# Patient Record
Sex: MALE | Race: WHITE | Employment: OTHER | URBAN - METROPOLITAN AREA
[De-identification: names, ages, dates, MRNs, and addresses within clinical notes are randomized per-mention and may not be internally consistent; named-entity substitution may affect disease eponyms.]

---

## 2017-06-27 ENCOUNTER — OFFICE VISIT (OUTPATIENT)
Dept: CARDIOLOGY CLINIC | Age: 62
End: 2017-06-27

## 2017-06-27 VITALS
HEIGHT: 71 IN | DIASTOLIC BLOOD PRESSURE: 71 MMHG | HEART RATE: 70 BPM | BODY MASS INDEX: 35 KG/M2 | WEIGHT: 250 LBS | SYSTOLIC BLOOD PRESSURE: 136 MMHG

## 2017-06-27 DIAGNOSIS — Z98.61 POSTSURGICAL PERCUTANEOUS TRANSLUMINAL CORONARY ANGIOPLASTY STATUS: ICD-10-CM

## 2017-06-27 DIAGNOSIS — R06.02 SOB (SHORTNESS OF BREATH) ON EXERTION: ICD-10-CM

## 2017-06-27 DIAGNOSIS — E78.5 OTHER AND UNSPECIFIED HYPERLIPIDEMIA: ICD-10-CM

## 2017-06-27 DIAGNOSIS — I25.119 ATHEROSCLEROSIS OF NATIVE CORONARY ARTERY OF NATIVE HEART WITH ANGINA PECTORIS (HCC): Primary | ICD-10-CM

## 2017-06-27 RX ORDER — AMLODIPINE BESYLATE 10 MG/1
TABLET ORAL DAILY
COMMUNITY
End: 2019-08-16 | Stop reason: CLARIF

## 2017-06-27 RX ORDER — ALPRAZOLAM 0.5 MG/1
TABLET ORAL AS NEEDED
COMMUNITY
End: 2020-01-07

## 2017-06-27 NOTE — LETTER
Matt Sheridan 1955 6/27/2017 Dear Neli Montero MD 
 
I had the pleasure of evaluating  Mr. Jakub Cazares in office today. Below are the relevant portions of my assessment and plan of care. ICD-10-CM ICD-9-CM 1. Atherosclerosis of native coronary artery of native heart with angina pectoris (HCC) I25.119 414.01 SCHEDULE NUCLEAR STUDY  
  413.9 2D ECHO COMPLETE ADULT (TTE) AMB POC EKG ROUTINE W/ 12 LEADS, INTER & REP  
 sob on exertion 2. Postsurgical percutaneous transluminal coronary angioplasty status Z98.61 V45.82   
 2004 3. Other and unspecified hyperlipidemia E78.5 272.4 CBC WITH AUTOMATED DIFF  
   METABOLIC PANEL, BASIC  
   LIPID PANEL  
   HEPATIC FUNCTION PANEL  
 has stopped statin due to elevated lft 4. SOB (shortness of breath) on exertion R06.02 786.05 SCHEDULE NUCLEAR STUDY  
   2D ECHO COMPLETE ADULT (TTE) PFT DLCO  
 ? ischemia ? pulmonary issues Current Outpatient Prescriptions Medication Sig Dispense Refill  amLODIPine (NORVASC) 10 mg tablet Take  by mouth daily.  LANSOPRAZOLE PO Take  by mouth every other day.  ALPRAZolam (XANAX) 0.5 mg tablet Take  by mouth as needed for Anxiety.  DOCOSAHEXANOIC ACID/EPA (FISH OIL PO) Take  by mouth daily.  ASPIRIN PO Take 81 mg by mouth daily. Orders Placed This Encounter  CBC WITH AUTOMATED DIFF Standing Status:   Future Standing Expiration Date:   7/27/2017  METABOLIC PANEL, BASIC Standing Status:   Future Standing Expiration Date:   7/27/2017  LIPID PANEL Standing Status:   Future Standing Expiration Date:   12/26/2017  
 HEPATIC FUNCTION PANEL Standing Status:   Future Standing Expiration Date:   12/26/2017  
 SCHEDULE NUCLEAR STUDY Exercise stress test  
  Standing Status:   Future Standing Expiration Date:   6/27/2018  2D ECHO COMPLETE ADULT (TTE) Standing Status:   Future Standing Expiration Date:   12/24/2017 Order Specific Question:   Reason for Exam: Answer:   see diagnosis  AMB POC EKG ROUTINE W/ 12 LEADS, INTER & REP Order Specific Question:   Reason for Exam: Answer:   shortness of breath  PFT DLCO Standing Status:   Future Standing Expiration Date:   12/25/2017  
 amLODIPine (NORVASC) 10 mg tablet Sig: Take  by mouth daily.  LANSOPRAZOLE PO Sig: Take  by mouth every other day.  ALPRAZolam (XANAX) 0.5 mg tablet Sig: Take  by mouth as needed for Anxiety. If you have questions, please do not hesitate to call me. I look forward to following Mr. Angie Chen along with you. Sincerely, Eva Espino MD

## 2017-06-27 NOTE — PROGRESS NOTES
HISTORY OF PRESENT ILLNESS  Helio Deal is a 62 y.o. male. New Patient   The history is provided by the patient. Associated symptoms include shortness of breath. Pertinent negatives include no chest pain, no abdominal pain and no headaches. Shortness of Breath   The history is provided by the patient. This is a new problem. The problem occurs frequently. The problem has not changed since onset. Pertinent negatives include no fever, no headaches, no cough, no sputum production, no hemoptysis, no wheezing, no PND, no orthopnea, no chest pain, no vomiting, no abdominal pain, no rash, no leg swelling and no claudication. Precipitated by: exertion. He has tried nothing for the symptoms. Associated medical issues include CAD. Associated medical issues do not include COPD or chronic lung disease. Review of Systems   Constitutional: Negative for chills and fever. HENT: Negative for nosebleeds. Eyes: Negative for blurred vision and double vision. Respiratory: Positive for shortness of breath. Negative for cough, hemoptysis, sputum production and wheezing. Cardiovascular: Negative for chest pain, palpitations, orthopnea, claudication, leg swelling and PND. Gastrointestinal: Negative for abdominal pain, heartburn, nausea and vomiting. Musculoskeletal: Negative for myalgias. Skin: Negative for rash. Neurological: Negative for dizziness, weakness and headaches. Endo/Heme/Allergies: Does not bruise/bleed easily.      Family History   Problem Relation Age of Onset    Heart Attack Father     Heart Disease Neg Hx        Past Medical History:   Diagnosis Date    Coronary artery disease     Coronary atherosclerosis of native coronary artery     Stable, no recent angina    Elevated blood pressure reading without diagnosis of hypertension     Monitor     Other and unspecified hyperlipidemia     Postsurgical percutaneous transluminal coronary angioplasty status     Eight years since stent, no recent stent testing, EKG normal       Past Surgical History:   Procedure Laterality Date    HX CORONARY STENT PLACEMENT  2004       Social History   Substance Use Topics    Smoking status: Former Smoker    Smokeless tobacco: Not on file      Comment: Remotely quit tobacco use    Alcohol use Yes      Comment: Occasional       No Known Allergies    Prior to Admission medications    Medication Sig Start Date End Date Taking? Authorizing Provider   amLODIPine (NORVASC) 10 mg tablet Take  by mouth daily. Yes Historical Provider   LANSOPRAZOLE PO Take  by mouth every other day. Yes Historical Provider   ALPRAZolam René Pian) 0.5 mg tablet Take  by mouth as needed for Anxiety. Yes Historical Provider   DOCOSAHEXANOIC ACID/EPA (FISH OIL PO) Take  by mouth daily. Yes Historical Provider   ASPIRIN PO Take 81 mg by mouth daily. Yes Historical Provider         Visit Vitals    /71    Pulse 70    Ht 5' 11\" (1.803 m)    Wt 113.4 kg (250 lb)    BMI 34.87 kg/m2         Physical Exam   Constitutional: He is oriented to person, place, and time. He appears well-developed and well-nourished. HENT:   Head: Normocephalic and atraumatic. Eyes: Conjunctivae are normal.   Neck: Neck supple. No JVD present. No tracheal deviation present. No thyromegaly present. Cardiovascular: Normal rate, regular rhythm and normal heart sounds. Exam reveals no gallop and no friction rub. No murmur heard. Pulmonary/Chest: Breath sounds normal. No respiratory distress. He has no wheezes. He has no rales. He exhibits no tenderness. Abdominal: Soft. There is no tenderness. Musculoskeletal: He exhibits no edema. Neurological: He is alert and oriented to person, place, and time. Skin: Skin is warm and dry. Psychiatric: He has a normal mood and affect. Mr. Debra Espinal has a reminder for a \"due or due soon\" health maintenance. I have asked that he contact his primary care provider for follow-up on this health maintenance.     No flowsheet data found. Assessment         ICD-10-CM ICD-9-CM    1. Atherosclerosis of native coronary artery of native heart with angina pectoris (HCC) I25.119 414.01 SCHEDULE NUCLEAR STUDY     413.9 2D ECHO COMPLETE ADULT (TTE)    sob on exertion   2. Postsurgical percutaneous transluminal coronary angioplasty status Z98.61 V45.82     2004   3. Other and unspecified hyperlipidemia E78.5 272.4 CBC WITH AUTOMATED DIFF      METABOLIC PANEL, BASIC      LIPID PANEL      HEPATIC FUNCTION PANEL    has stopped statin due to elevated lft   4. SOB (shortness of breath) on exertion R06.02 786.05 SCHEDULE NUCLEAR STUDY      2D ECHO COMPLETE ADULT (TTE)      PFT DLCO    ? ischemia  ? pulmonary issues       Medications Discontinued During This Encounter   Medication Reason    CALCIUM CARBONATE/MAG HYDROX (ANTACID PO) Not A Current Medication    simvastatin (ZOCOR) 40 mg tablet Not A Current Medication       Orders Placed This Encounter    CBC WITH AUTOMATED DIFF     Standing Status:   Future     Standing Expiration Date:   9/53/7081    METABOLIC PANEL, BASIC     Standing Status:   Future     Standing Expiration Date:   7/27/2017    LIPID PANEL     Standing Status:   Future     Standing Expiration Date:   12/26/2017    HEPATIC FUNCTION PANEL     Standing Status:   Future     Standing Expiration Date:   12/26/2017    SCHEDULE NUCLEAR STUDY     Exercise stress test     Standing Status:   Future     Standing Expiration Date:   6/27/2018    2D ECHO COMPLETE ADULT (TTE)     Standing Status:   Future     Standing Expiration Date:   12/24/2017     Order Specific Question:   Reason for Exam:     Answer:   see diagnosis    PFT DLCO     Standing Status:   Future     Standing Expiration Date:   12/25/2017       Follow-up Disposition:  Return for F/u after tests.

## 2017-06-27 NOTE — MR AVS SNAPSHOT
Visit Information Date & Time Provider Department Dept. Phone Encounter #  
 6/27/2017 10:00 AM Mat Bone MD [facility] [phone] Follow-up Instructions Return for F/u after tests. Your Appointments 7/6/2017  9:00 AM  
PROCEDURE with [place] NUC [facility] ([address]) Appt Note: Nuclear/Echo/[address] [facility] [address]  
  
   
 [address] [place] [id number]  
  
    
 7/6/2017 10:00 AM  
PROCEDURE with [place] ECHO [facility] ([address]) Appt Note: Echo/Nuclear/[address] [facility] [address]  
  
   
 [address] [facility]  
  
    
 8/14/2017  1:30 PM  
Follow Up with Mat Bone MD  
[facility] ([address] [facility]) Appt Note: Post Nuclear/Echo/PFT/CBC/BMP/Lipid/LFT  
 [address] [facility] [facility] [address]  
  
   
 [address] [place] [address] Upcoming Health Maintenance Date Due Hepatitis C Screening 1955 DTaP/Tdap/Td series (1 - Tdap) 8/7/1977 FOBT Q 1 YEAR AGE 50-75 8/7/2006 ZOSTER VACCINE AGE 60> 8/7/2016 INFLUENZA AGE 9 TO ADULT 8/1/2017 Allergies as of 6/27/2017  Review Complete On: 6/27/2017 By: Mat Bone MD  
 No Known Allergies Current Immunizations  Never Reviewed No immunizations on file. Not reviewed this visit You Were Diagnosed With   
  
 Codes Comments Atherosclerosis of native coronary artery of native heart with angina pectoris ([facility] [address]    -  Primary ICD-10-CM: I25.119 ICD-9-CM: 414.01, 413.9 sob on exertion Postsurgical percutaneous transluminal coronary angioplasty status     ICD-10-CM: Z98.61 ICD-9-CM: V45.82 2004 Other and unspecified hyperlipidemia     ICD-10-CM: E78.5 ICD-9-CM: 272.4 has stopped statin due to elevated lft SOB (shortness of breath) on exertion     ICD-10-CM: R06.02 
ICD-9-CM: 786.05 ? ischemia ? pulmonary issues Vitals BP Pulse Height(growth percentile) Weight(growth percentile) BMI Smoking Status 136/71 70 5' 11\" (1.803 m) 250 lb (113.4 kg) 34.87 kg/m2 Former Smoker Vitals History BMI and BSA Data Body Mass Index Body Surface Area 34.87 kg/m 2 2.38 m 2 Your Updated Medication List  
  
   
This list is accurate as of: 6/27/17 11:14 AM.  Always use your most recent med list. amLODIPine 10 mg tablet Commonly known as:  Sameul Loraine Take  by mouth daily. ASPIRIN PO Take 81 mg by mouth daily. FISH OIL PO Take  by mouth daily. LANSOPRAZOLE PO Take  by mouth every other day. XANAX 0.5 mg tablet Generic drug:  ALPRAZolam  
Take  by mouth as needed for Anxiety. We Performed the Following AMB POC EKG ROUTINE W/ 12 LEADS, INTER & REP [61650 CPT(R)] Follow-up Instructions Return for F/u after tests. To-Do List   
 06/27/2017 Lab:  HEPATIC FUNCTION PANEL   
  
 06/27/2017 Lab:  LIPID PANEL   
  
 06/30/2017 Cardiac Services:  2D ECHO COMPLETE ADULT (TTE) 07/04/2017 Lab:  CBC WITH AUTOMATED DIFF   
  
 07/04/2017 Lab:  METABOLIC PANEL, BASIC   
  
 07/04/2017 PFT:  PFT DLCO   
  
 07/04/2017 Nursing:  SCHEDULE NUCLEAR STUDY Patient Instructions Patient is scheduled to have a PFT at [place] [address] on 8/8/17 @ 10:00 Introducing [facility]! [place] [employer] introduces [employer] patient portal. Now you can access parts of your medical record, email your doctor's office, and request medication refills online. 1. In your internet browser, go to [email or web address] 2. Click on the First Time User? Click Here link in the Sign In box. You will see the New Member Sign Up page. 3. Enter your MOG Access Code exactly as it appears below.  You will not need to use this code after youve completed the sign-up process. If you do not sign up before the expiration date, you must request a new code. · Yieldex Access Code: [email or web address] Expires: 9/25/2017 10:03 AM 
 
4. Enter the last four digits of your Social Security Number (xxxx) and Date of Birth (mm/dd/yyyy) as indicated and click Submit. You will be taken to the next sign-up page. 5. Create a Yieldex ID. This will be your Yieldex login ID and cannot be changed, so think of one that is secure and easy to remember. 6. Create a Yieldex password. You can change your password at any time. 7. Enter your Password Reset Question and Answer. This can be used at a later time if you forget your password. 8. Enter your e-mail address. You will receive e-mail notification when new information is available in [address] 9. Click Sign Up. You can now view and download portions of your medical record. 10. Click the Download Summary menu link to download a portable copy of your medical information. If you have questions, please visit the Frequently Asked Questions section of the Yieldex website. Remember, Yieldex is NOT to be used for urgent needs. For medical emergencies, dial 911. Now available from your iPhone and Android! Please provide this summary of care documentation to your next provider. Your primary care clinician is listed as Catia Lang. If you have any questions after today's visit, please call [phone].

## 2017-07-06 ENCOUNTER — CLINICAL SUPPORT (OUTPATIENT)
Dept: CARDIOLOGY CLINIC | Age: 62
End: 2017-07-06

## 2017-07-06 DIAGNOSIS — Z98.61 POSTSURGICAL PERCUTANEOUS TRANSLUMINAL CORONARY ANGIOPLASTY STATUS: ICD-10-CM

## 2017-07-06 DIAGNOSIS — R03.0 ELEVATED BLOOD PRESSURE READING WITHOUT DIAGNOSIS OF HYPERTENSION: ICD-10-CM

## 2017-07-06 DIAGNOSIS — R06.02 SHORTNESS OF BREATH: ICD-10-CM

## 2017-07-06 DIAGNOSIS — R06.02 SOB (SHORTNESS OF BREATH) ON EXERTION: ICD-10-CM

## 2017-07-06 DIAGNOSIS — I25.119 CORONARY ARTERY DISEASE INVOLVING NATIVE CORONARY ARTERY OF NATIVE HEART WITH ANGINA PECTORIS (HCC): Primary | ICD-10-CM

## 2017-07-06 DIAGNOSIS — I25.119 ATHEROSCLEROSIS OF NATIVE CORONARY ARTERY OF NATIVE HEART WITH ANGINA PECTORIS (HCC): ICD-10-CM

## 2017-07-10 ENCOUNTER — TELEPHONE (OUTPATIENT)
Dept: CARDIOLOGY CLINIC | Age: 62
End: 2017-07-10

## 2017-07-10 NOTE — TELEPHONE ENCOUNTER
Patient's wife calling to report symptoms that the patient had on Saturday while having sex, chest pain and shortness of breath. The chest pain resolved with rest but the shortness of breath continues today. Please advise.

## 2017-07-11 NOTE — TELEPHONE ENCOUNTER
Patient called to discuss need for follow up appointment with Dr. Jas Taylor. He will be seen on Wednesday at 1130 to discuss possible cath. He voices understanding and acceptance of this advice and will call back if any further questions or concerns.

## 2017-07-19 ENCOUNTER — OFFICE VISIT (OUTPATIENT)
Dept: CARDIOLOGY CLINIC | Age: 62
End: 2017-07-19

## 2017-07-19 VITALS
BODY MASS INDEX: 35.28 KG/M2 | DIASTOLIC BLOOD PRESSURE: 66 MMHG | HEIGHT: 71 IN | SYSTOLIC BLOOD PRESSURE: 128 MMHG | HEART RATE: 73 BPM | WEIGHT: 252 LBS

## 2017-07-19 DIAGNOSIS — I25.119 ATHEROSCLEROSIS OF NATIVE CORONARY ARTERY OF NATIVE HEART WITH ANGINA PECTORIS (HCC): Primary | ICD-10-CM

## 2017-07-19 DIAGNOSIS — D69.6 THROMBOCYTOPENIA (HCC): ICD-10-CM

## 2017-07-19 DIAGNOSIS — E78.5 OTHER AND UNSPECIFIED HYPERLIPIDEMIA: ICD-10-CM

## 2017-07-19 DIAGNOSIS — Z98.61 POSTSURGICAL PERCUTANEOUS TRANSLUMINAL CORONARY ANGIOPLASTY STATUS: ICD-10-CM

## 2017-07-19 DIAGNOSIS — R79.89 ELEVATED LFTS: ICD-10-CM

## 2017-07-19 DIAGNOSIS — R50.9 FEVER, UNSPECIFIED FEVER CAUSE: ICD-10-CM

## 2017-07-19 NOTE — MR AVS SNAPSHOT
Visit Information Date & Time Provider Department Dept. Phone Encounter #  
 7/19/2017 11:30 AM Jasmine Hays MD [facility] [phone] [id number] Follow-up Instructions Return in about 6 months (around 1/19/2018). Upcoming Health Maintenance Date Due Hepatitis C Screening 1955 DTaP/Tdap/Td series (1 - Tdap) 8/7/1977 FOBT Q 1 YEAR AGE 50-75 8/7/2006 ZOSTER VACCINE AGE 60> 8/7/2016 INFLUENZA AGE 9 TO ADULT 8/1/2017 Allergies as of 7/19/2017  Review Complete On: 7/19/2017 By: Jasmine Hays MD  
 No Known Allergies Current Immunizations  Never Reviewed No immunizations on file. Not reviewed this visit You Were Diagnosed With   
  
 Codes Comments Atherosclerosis of native coronary artery of native heart with angina pectoris ([facility]    -  Primary ICD-10-CM: I25.119 ICD-9-CM: 414.01, 413.9 stable 
negative stress test  
 Postsurgical percutaneous transluminal coronary angioplasty status     ICD-10-CM: Z98.61 ICD-9-CM: V45.82 stable Other and unspecified hyperlipidemia     ICD-10-CM: E78.5 ICD-9-CM: 272.4 Elevated LFTs     ICD-10-CM: R94.5 ICD-9-CM: 790.6 recent fever and er visit Fever, unspecified fever cause     ICD-10-CM: R50.9 ICD-9-CM: 780.60 recent er visit Thrombocytopenia ([facility]     ICD-10-CM: D69.6 ICD-9-CM: 287.5 ? etiology Vitals BP Pulse Height(growth percentile) Weight(growth percentile) BMI Smoking Status 128/66 73 5' 11\" (1.803 m) 252 lb (114.3 kg) 35.15 kg/m2 Former Smoker Vitals History BMI and BSA Data Body Mass Index Body Surface Area  
 35.15 kg/m 2 2.39 m 2 Your Updated Medication List  
  
   
This list is accurate as of: 7/19/17 11:59 AM.  Always use your most recent med list. amLODIPine 10 mg tablet Commonly known as:  Remonia Grates Take  by mouth daily. ASPIRIN PO Take 81 mg by mouth daily.   
  
 FISH OIL PO  
 Take  by mouth daily. LANSOPRAZOLE PO Take  by mouth every other day. XANAX 0.5 mg tablet Generic drug:  ALPRAZolam  
Take  by mouth as needed for Anxiety. Follow-up Instructions Return in about 6 months (around 1/19/2018). Introducing [facility]! [employer] introduces [employer] patient portal. Now you can access parts of your medical record, email your doctor's office, and request medication refills online. 1. In your internet browser, go to [email or web address] 2. Click on the First Time User? Click Here link in the Sign In box. You will see the New Member Sign Up page. 3. Enter your PeopleMatter Access Code exactly as it appears below. You will not need to use this code after youve completed the sign-up process. If you do not sign up before the expiration date, you must request a new code. · PeopleMatter Access Code: [email or web address] Expires: 9/25/2017 10:03 AM 
 
4. Enter the last four digits of your Social Security Number (xxxx) and Date of Birth (mm/dd/yyyy) as indicated and click Submit. You will be taken to the next sign-up page. 5. Create a PeopleMatter ID. This will be your PeopleMatter login ID and cannot be changed, so think of one that is secure and easy to remember. 6. Create a PeopleMatter password. You can change your password at any time. 7. Enter your Password Reset Question and Answer. This can be used at a later time if you forget your password. 8. Enter your e-mail address. You will receive e-mail notification when new information is available in [address] 9. Click Sign Up. You can now view and download portions of your medical record. 10. Click the Download Summary menu link to download a portable copy of your medical information. If you have questions, please visit the Frequently Asked Questions section of the PeopleMatter website. Remember, PeopleMatter is NOT to be used for urgent needs. For medical emergencies, dial 911. Now available from your iPhone and Android! Please provide this summary of care documentation to your next provider. Your primary care clinician is listed as Gisela Lang. If you have any questions after today's visit, please call [phone].

## 2017-07-19 NOTE — PROGRESS NOTES
HISTORY OF PRESENT ILLNESS  Janel Savage is a 62 y.o. male. HPI Comments: Patient is here for follow up of diagnostic tests. Results will be discussed. Cholesterol Problem   The history is provided by the patient. This is a chronic problem. The problem occurs constantly. The problem has not changed since onset. Pertinent negatives include no chest pain, no abdominal pain, no headaches and no shortness of breath. Shortness of Breath   The history is provided by the patient. This is a new problem. The problem occurs frequently. The problem has not changed since onset. Pertinent negatives include no fever, no headaches, no cough, no sputum production, no hemoptysis, no wheezing, no PND, no orthopnea, no chest pain, no vomiting, no abdominal pain, no rash, no leg swelling and no claudication. Precipitated by: exertion. He has tried nothing for the symptoms. Associated medical issues include CAD. Associated medical issues do not include COPD or chronic lung disease. Review of Systems   Constitutional: Negative for chills and fever. HENT: Negative for nosebleeds. Eyes: Negative for blurred vision and double vision. Respiratory: Negative for cough, hemoptysis, sputum production, shortness of breath and wheezing. Cardiovascular: Negative for chest pain, palpitations, orthopnea, claudication, leg swelling and PND. Gastrointestinal: Negative for abdominal pain, heartburn, nausea and vomiting. Musculoskeletal: Negative for myalgias. Skin: Negative for rash. Neurological: Negative for dizziness, weakness and headaches. Endo/Heme/Allergies: Does not bruise/bleed easily.      Family History   Problem Relation Age of Onset    Heart Attack Father     Heart Disease Neg Hx        Past Medical History:   Diagnosis Date    Coronary artery disease     Coronary atherosclerosis of native coronary artery     Stable, no recent angina    Elevated blood pressure reading without diagnosis of hypertension Monitor     Other and unspecified hyperlipidemia     Postsurgical percutaneous transluminal coronary angioplasty status     Eight years since stent, no recent stent testing, EKG normal       Past Surgical History:   Procedure Laterality Date    HX CORONARY STENT PLACEMENT  2004       Social History   Substance Use Topics    Smoking status: Former Smoker    Smokeless tobacco: Not on file      Comment: Remotely quit tobacco use    Alcohol use Yes      Comment: Occasional       No Known Allergies    Prior to Admission medications    Medication Sig Start Date End Date Taking? Authorizing Provider   amLODIPine (NORVASC) 10 mg tablet Take  by mouth daily. Yes Historical Provider   LANSOPRAZOLE PO Take  by mouth every other day. Yes Historical Provider   ALPRAZolam Sydnee Craft) 0.5 mg tablet Take  by mouth as needed for Anxiety. Yes Historical Provider   DOCOSAHEXANOIC ACID/EPA (FISH OIL PO) Take  by mouth daily. Yes Historical Provider   ASPIRIN PO Take 81 mg by mouth daily. Yes Historical Provider         Visit Vitals    /66    Pulse 73    Ht 5' 11\" (1.803 m)    Wt 114.3 kg (252 lb)    BMI 35.15 kg/m2         Physical Exam   Constitutional: He is oriented to person, place, and time. He appears well-developed and well-nourished. HENT:   Head: Normocephalic and atraumatic. Eyes: Conjunctivae are normal.   Neck: Neck supple. No JVD present. No tracheal deviation present. No thyromegaly present. Cardiovascular: Normal rate, regular rhythm and normal heart sounds. Exam reveals no gallop and no friction rub. No murmur heard. Pulmonary/Chest: Breath sounds normal. No respiratory distress. He has no wheezes. He has no rales. He exhibits no tenderness. Abdominal: Soft. There is no tenderness. Musculoskeletal: He exhibits no edema. Neurological: He is alert and oriented to person, place, and time. Skin: Skin is warm and dry. Psychiatric: He has a normal mood and affect.         Angelina Christopher has a reminder for a \"due or due soon\" health maintenance. I have asked that he contact his primary care provider for follow-up on this health maintenance. SUMMARY:echo-2017  Left ventricle: Systolic function was normal. Ejection fraction was  estimated in the range of 55 % to 60 %. There were no regional wall motion  abnormalities. There was moderate concentric hypertrophy. COMPARISONS:  Comparison was made with the previous study of 10-Santhosh-2013. LV thickness  has increased. NUCLEAR IMAGIN2017     Findings:   1. Stress images reveal normal Myoview distrubution in all the LV segments in short axis, vertical and horizontal long axis views. 2. Resting images have a normal uptake. 3. Gated images reveal normal wall motion and the ejection fraction is calculated to be 74%. Conclusion:   1. Normal perfusion scan. 2. Normal wall motion and ejection fraction. 3. Low risk scan. Assessment         ICD-10-CM ICD-9-CM    1. Atherosclerosis of native coronary artery of native heart with angina pectoris ([facility]) I25.119 414.01      413.9     stable  negative stress test   2. Postsurgical percutaneous transluminal coronary angioplasty status Z98.61 V45.82     stable   3. Other and unspecified hyperlipidemia E78.5 272.4    4. Elevated LFTs R94.5 790.6     recent fever and er visit   5. Fever, unspecified fever cause R50.9 780.60     recent er visit   6. Thrombocytopenia ([facility]) D69.6 287.5     ? etiology       There are no discontinued medications. No orders of the defined types were placed in this encounter. Follow-up Disposition:  Return in about 6 months (around 2018).

## 2017-07-19 NOTE — LETTER
Zeusteddy Richards 1955 7/19/2017 Dear Arelis Issa MD 
 
I had the pleasure of evaluating  Mr. Arsenio George in office today. Below are the relevant portions of my assessment and plan of care. ICD-10-CM ICD-9-CM 1. Atherosclerosis of native coronary artery of native heart with angina pectoris (HCC) I25.119 414.01   
  413.9   
 stable 
negative stress test  
2. Postsurgical percutaneous transluminal coronary angioplasty status Z98.61 V45.82   
 stable 3. Other and unspecified hyperlipidemia E78.5 272.4 4. Elevated LFTs R94.5 790.6   
 recent fever and er visit 5. Fever, unspecified fever cause R50.9 780.60   
 recent er visit 6. Thrombocytopenia (HCC) D69.6 287.5   
 ? etiology Current Outpatient Prescriptions Medication Sig Dispense Refill  amLODIPine (NORVASC) 10 mg tablet Take  by mouth daily.  LANSOPRAZOLE PO Take  by mouth every other day.  ALPRAZolam (XANAX) 0.5 mg tablet Take  by mouth as needed for Anxiety.  DOCOSAHEXANOIC ACID/EPA (FISH OIL PO) Take  by mouth daily.  ASPIRIN PO Take 81 mg by mouth daily. No orders of the defined types were placed in this encounter. If you have questions, please do not hesitate to call me. I look forward to following Mr. Arsenio George along with you. Sincerely, Asmita Akbar MD

## 2017-07-19 NOTE — PROGRESS NOTES
1. Have you been to the ER, urgent care clinic since your last visit? Hospitalized since your last visit? Yes Where: [facility]    2. Have you seen or consulted any other health care providers outside of the [address] [place] [address] since your last visit? Include any pap smears or colon screening. No     3. Since your last visit, have you had any of the following symptoms? chest pains, shortness of breath and swelling in legs/arms. 4.  Have you had any blood work, X-rays or cardiac testing? Yes Where: [facility]            5.  Where do you normally have your labs drawn? [facility]    6. Do you need any refills today?    No

## 2019-03-26 ENCOUNTER — HOSPITAL ENCOUNTER (OUTPATIENT)
Age: 64
Setting detail: OUTPATIENT SURGERY
Discharge: HOME | End: 2019-03-26
Attending: INTERNAL MEDICINE | Admitting: INTERNAL MEDICINE
Payer: COMMERCIAL

## 2019-03-26 ENCOUNTER — ANESTHESIA EVENT (OUTPATIENT)
Dept: ENDOSCOPY | Age: 64
End: 2019-03-26
Payer: COMMERCIAL

## 2019-03-26 ENCOUNTER — ANESTHESIA (OUTPATIENT)
Dept: ENDOSCOPY | Age: 64
End: 2019-03-26
Payer: COMMERCIAL

## 2019-03-26 VITALS
HEIGHT: 71 IN | WEIGHT: 264 LBS | RESPIRATION RATE: 15 BRPM | TEMPERATURE: 98 F | BODY MASS INDEX: 36.96 KG/M2 | HEART RATE: 62 BPM | DIASTOLIC BLOOD PRESSURE: 66 MMHG | SYSTOLIC BLOOD PRESSURE: 124 MMHG | OXYGEN SATURATION: 97 %

## 2019-03-26 PROCEDURE — 76040000019: Performed by: INTERNAL MEDICINE

## 2019-03-26 PROCEDURE — 74011250636 HC RX REV CODE- 250/636

## 2019-03-26 PROCEDURE — 77030019988 HC FCPS ENDOSC DISP BSC -B: Performed by: INTERNAL MEDICINE

## 2019-03-26 PROCEDURE — 77030008565 HC TBNG SUC IRR ERBE -B: Performed by: INTERNAL MEDICINE

## 2019-03-26 PROCEDURE — 74011250636 HC RX REV CODE- 250/636: Performed by: NURSE ANESTHETIST, CERTIFIED REGISTERED

## 2019-03-26 PROCEDURE — 74011000250 HC RX REV CODE- 250: Performed by: NURSE ANESTHETIST, CERTIFIED REGISTERED

## 2019-03-26 PROCEDURE — 76060000031 HC ANESTHESIA FIRST 0.5 HR: Performed by: INTERNAL MEDICINE

## 2019-03-26 PROCEDURE — 77030018846 HC SOL IRR STRL H20 ICUM -A: Performed by: INTERNAL MEDICINE

## 2019-03-26 PROCEDURE — 88305 TISSUE EXAM BY PATHOLOGIST: CPT

## 2019-03-26 RX ORDER — SODIUM CHLORIDE, SODIUM LACTATE, POTASSIUM CHLORIDE, CALCIUM CHLORIDE 600; 310; 30; 20 MG/100ML; MG/100ML; MG/100ML; MG/100ML
75 INJECTION, SOLUTION INTRAVENOUS CONTINUOUS
Status: DISCONTINUED | OUTPATIENT
Start: 2019-03-26 | End: 2019-03-26 | Stop reason: HOSPADM

## 2019-03-26 RX ORDER — LIDOCAINE HYDROCHLORIDE 20 MG/ML
INJECTION, SOLUTION EPIDURAL; INFILTRATION; INTRACAUDAL; PERINEURAL AS NEEDED
Status: DISCONTINUED | OUTPATIENT
Start: 2019-03-26 | End: 2019-03-26 | Stop reason: HOSPADM

## 2019-03-26 RX ORDER — PROPOFOL 10 MG/ML
INJECTION, EMULSION INTRAVENOUS AS NEEDED
Status: DISCONTINUED | OUTPATIENT
Start: 2019-03-26 | End: 2019-03-26 | Stop reason: HOSPADM

## 2019-03-26 RX ORDER — SODIUM CHLORIDE 0.9 % (FLUSH) 0.9 %
5-40 SYRINGE (ML) INJECTION AS NEEDED
Status: DISCONTINUED | OUTPATIENT
Start: 2019-03-26 | End: 2019-03-26 | Stop reason: HOSPADM

## 2019-03-26 RX ORDER — SODIUM CHLORIDE 0.9 % (FLUSH) 0.9 %
5-40 SYRINGE (ML) INJECTION EVERY 8 HOURS
Status: DISCONTINUED | OUTPATIENT
Start: 2019-03-26 | End: 2019-03-26 | Stop reason: HOSPADM

## 2019-03-26 RX ADMIN — PROPOFOL 80 MG: 10 INJECTION, EMULSION INTRAVENOUS at 12:41

## 2019-03-26 RX ADMIN — FAMOTIDINE 20 MG: 10 INJECTION INTRAVENOUS at 09:36

## 2019-03-26 RX ADMIN — LIDOCAINE HYDROCHLORIDE 40 MG: 20 INJECTION, SOLUTION EPIDURAL; INFILTRATION; INTRACAUDAL; PERINEURAL at 12:41

## 2019-03-26 RX ADMIN — PROPOFOL 20 MG: 10 INJECTION, EMULSION INTRAVENOUS at 12:44

## 2019-03-26 RX ADMIN — PROPOFOL 20 MG: 10 INJECTION, EMULSION INTRAVENOUS at 12:43

## 2019-03-26 RX ADMIN — SODIUM CHLORIDE, SODIUM LACTATE, POTASSIUM CHLORIDE, AND CALCIUM CHLORIDE 75 ML/HR: 600; 310; 30; 20 INJECTION, SOLUTION INTRAVENOUS at 09:36

## 2019-03-26 NOTE — H&P
[facility]    [email or web address]      Impression: 1. Hx of varices      Plan:   EGD and possible band  1. Chief Complaint:   cirrhosis    HPI:  Betzy Rincon is a 59 y.o. male who is being seen on consult for  The above. Last EGD >1 yr ago. Small varices seen, NOT banded.     PMH:   Past Medical History:   Diagnosis Date    Coronary artery disease     Coronary atherosclerosis of native coronary artery     Stable, no recent angina    Elevated blood pressure reading without diagnosis of hypertension     Monitor     Other and unspecified hyperlipidemia     Postsurgical percutaneous transluminal coronary angioplasty status     Eight years since stent, no recent stent testing, EKG normal       PSH:   Past Surgical History:   Procedure Laterality Date    HX CORONARY STENT PLACEMENT  2004       Social HX:   Social History     Socioeconomic History    Marital status:      Spouse name: Not on file    Number of children: Not on file    Years of education: Not on file    Highest education level: Not on file   Occupational History    Not on file   Social Needs    Financial resource strain: Not on file    Food insecurity:     Worry: Not on file     Inability: Not on file    Transportation needs:     Medical: Not on file     Non-medical: Not on file   Tobacco Use    Smoking status: Former Smoker    Tobacco comment: Remotely quit tobacco use   Substance and Sexual Activity    Alcohol use: Yes     Comment: Occasional    Drug use: Not on file    Sexual activity: Not on file   Lifestyle    Physical activity:     Days per week: Not on file     Minutes per session: Not on file    Stress: Not on file   Relationships    Social connections:     Talks on phone: Not on file     Gets together: Not on file     Attends Gnosticism service: Not on file     Active member of club or organization: Not on file     Attends meetings of clubs or organizations: Not on file     Relationship status: Not on file    Intimate partner violence:     Fear of current or ex partner: Not on file     Emotionally abused: Not on file     Physically abused: Not on file     Forced sexual activity: Not on file   Other Topics Concern    Not on file   Social History Narrative    Not on file       FHX:   Family History   Problem Relation Age of Onset    Heart Attack Father     Heart Disease Neg Hx        Allergy:   No Known Allergies    Home Medications:     Medications Prior to Admission   Medication Sig    LANSOPRAZOLE PO Take  by mouth every other day.  ALPRAZolam (XANAX) 0.5 mg tablet Take  by mouth as needed for Anxiety.  DOCOSAHEXANOIC ACID/EPA (FISH OIL PO) Take  by mouth daily.  ASPIRIN PO Take 81 mg by mouth daily.  amLODIPine (NORVASC) 10 mg tablet Take  by mouth daily. Review of Systems:     Constitutional: No fevers, chills, weight loss, fatigue. Skin: No rashes, pruritis, jaundice, ulcerations, erythema. HENT: No headaches, nosebleeds, sinus pressure, rhinorrhea, sore throat. Eyes: No visual changes, blurred vision, eye pain, photophobia, jaundice. Cardiovascular: No chest pain, heart palpitations. Respiratory: No cough, SOB, wheezing, chest discomfort, orthopnea. Gastrointestinal: neg   Genitourinary: No dysuria, bleeding, discharge, pyuria. Musculoskeletal: No weakness, arthralgias, wasting. Endo: No sweats. Heme: No bruising, easy bleeding. Allergies: As noted. Neurological: Cranial nerves intact. Alert and oriented. Gait not assessed. Psychiatric:  No anxiety, depression, hallucinations. Visit Vitals  /82   Pulse 68   Temp 98.3 °F (36.8 °C)   Resp 18   Ht 5' 11\" (1.803 m)   Wt 119.7 kg (264 lb)   SpO2 96%   BMI 36.82 kg/m²       Physical Assessment:     constitutional: appearance: well developed, well nourished, normal habitus, no deformities, in no acute distress.    skin: inspection: no rashes, ulcers, icterus or other lesions; no clubbing or telangiectasias. palpation: no induration or subcutaneos nodules. eyes: inspection: normal conjunctivae and lids; no jaundice pupils: symmetrical, normoreactive to light, normal accommodation and size. ENMT: mouth: normal oral mucosa,lips and gums; good dentition. oropharynx: normal tongue, hard and soft palate; posterior pharynx without erithema, exudate or lesions. neck: thyroid: normal size, consistency and position; no masses or tenderness. respiratory: effort: normal chest excursion; no intercostal retraction or accessory muscle use. cardiovascular: abdominal aorta: normal size and position; no bruits. palpation: PMI of normal size and position; normal rhythm; no thrill or murmurs. abdominal: abdomen: normal consistency; no tenderness or masses. hernias: no hernias appreciated. liver: normal size and consistency. spleen: not palpable. rectal: hemoccult/guaiac: not performed. musculoskeletal: digits and nails: no clubbing, cyanosis, petechiae or other inflammatory conditions. gait: normal gait and station head and neck: normal range of motion; no pain, crepitation or contracture. spine/ribs/pelvis: normal range of motion; no pain, deformity or contracture. lymphatic: axilae: not palpable. groin: not palpable. neck: within normal limits. other: not palpable. neurologic: cranial nerves: II-XII normal.   psychiatric: judgement/insight: within normal limits. memory: within normal limits for recent and remote events. mood and affect: no evidence of depression, anxiety or agitation. orientation: oriented to time, space and person. Basic Metabolic Profile   No results for input(s): NA, K, CL, CO2, BUN, GLU, CA, MG, PHOS in the last 72 hours. No lab exists for component: CREAT      CBC w/Diff    No results for input(s): WBC, RBC, HGB, HCT, MCV, MCH, MCHC, RDW, PLT, HGBEXT, HCTEXT, PLTEXT in the last 72 hours.     No lab exists for component: MPV No results for input(s): GRANS, LYMPH, EOS, PRO, MYELO, METAS, BLAST in the last 72 hours. No lab exists for component: MONO, BASO     Hepatic Function   No results for input(s): ALB, TP, TBILI, GPT, SGOT, AP, AML, LPSE in the last 72 hours. No lab exists for component: Padmini Delaney MD, M.D. [facility], [address] [place] [address]  [email or web address]

## 2019-03-26 NOTE — DISCHARGE INSTRUCTIONS
Gastritis: Care Instructions  Your Care Instructions    Gastritis is a sore and upset stomach. It happens when something irritates the stomach lining. Many things can cause it. These include an infection such as the flu or something you ate or drank. Medicines or a sore on the lining of the stomach (ulcer) also can cause it. Your belly may bloat and ache. You may belch, vomit, and feel sick to your stomach. You should be able to relieve the problem by taking medicine. And it may help to change your diet. If gastritis lasts, your doctor may prescribe medicine. Follow-up care is a key part of your treatment and safety. Be sure to make and go to all appointments, and call your doctor if you are having problems. It's also a good idea to know your test results and keep a list of the medicines you take. How can you care for yourself at home? · If your doctor prescribed antibiotics, take them as directed. Do not stop taking them just because you feel better. You need to take the full course of antibiotics. · Be safe with medicines. If your doctor prescribed medicine to decrease stomach acid, take it as directed. Call your doctor if you think you are having a problem with your medicine. · Do not take any other medicine, including over-the-counter pain relievers, without talking to your doctor first.  · If your doctor recommends over-the-counter medicine to reduce stomach acid, such as Pepcid AC, Prilosec, Tagamet HB, or Zantac 75, follow the directions on the label. · Drink plenty of fluids (enough so that your urine is light yellow or clear like water) to prevent dehydration. Choose water and other caffeine-free clear liquids. If you have kidney, heart, or liver disease and have to limit fluids, talk with your doctor before you increase the amount of fluids you drink. · Limit how much alcohol you drink. · Avoid coffee, tea, cola drinks, chocolate, and other foods with caffeine. They increase stomach acid.   When should you call for help? Call 911 anytime you think you may need emergency care. For example, call if:    · You vomit blood or what looks like coffee grounds.     · You pass maroon or very bloody stools.    Call your doctor now or seek immediate medical care if:    · You start breathing fast and have not produced urine in the last 8 hours.     · You cannot keep fluids down.    Watch closely for changes in your health, and be sure to contact your doctor if:    · You do not get better as expected. Where can you learn more? Go to http://pablo-cyndi.info/. Enter [id number] in the search box to learn more about \"Gastritis: Care Instructions. \"  Current as of: March 27, 2018  Content Version: 11.9  © 7098-3974 [employer]. Care instructions adapted under license by [employer] (which disclaims liability or warranty for this information). If you have questions about a medical condition or this instruction, always ask your healthcare professional. [place] [address] any warranty or liability for your use of this information. Bill's Esophagus: Care Instructions  Your Care Instructions    The esophagus is the tube that connects the throat to the stomach. Food and liquids go through this tube. In Bill's esophagus, the cells that line the tube change. This is usually because of gastroesophageal reflux disease (GERD). GERD causes acid from your stomach to back up into the esophagus. When you have Bill's esophagus, you are slightly more likely to get cancer of the esophagus. So regular testing is important to watch for signs of this cancer. You can treat GERD to control your symptoms and feel better. Follow-up care is a key part of your treatment and safety. Be sure to make and go to all appointments, and call your doctor if you are having problems. It's also a good idea to know your test results and keep a list of the medicines you take.   How can you care for yourself at home? · Take your medicines exactly as prescribed. Call your doctor if you think you are having a problem with your medicine. · If you take over-the-counter medicine, such as antacids or acid reducers, follow all instructions on the label. If you use these medicines often, talk with your doctor. Be careful when you take over-the-counter antacid medicines. Many of these medicines have aspirin in them. Read the label to make sure that you are not taking more than the recommended dose. Too much aspirin can be harmful. · Do not smoke or chew tobacco. Smoking can make GERD worse. If you need help quitting, talk to your doctor about stop-smoking programs and medicines. These can increase your chances of quitting for good. · Chocolate, mint, and alcohol can make GERD worse. They relax the valve between the esophagus and the stomach. · Spicy foods, foods that have a lot of acid (like tomatoes and oranges), and coffee can make GERD symptoms worse in some people. If your symptoms are worse after you eat a certain food, you may want to stop eating that food to see if your symptoms get better. · Eat smaller meals, and more often. After eating, wait 2 to 3 hours before you lie down. · Raise the head of your bed 6 to 8 inches by putting blocks under the frame or a foam wedge under the head of the mattress. · Do not wear tight clothing around your midsection. · If you are overweight, lose weight. Even losing 5 to 10 pounds can help. When should you call for help? Call your doctor now or seek immediate medical care if:    · You have new or worse belly pain.     · You are vomiting.    Watch closely for changes in your health, and be sure to contact your doctor if:    · You have any pain or difficulty swallowing.     · You are losing weight.     · You have new or worse symptoms, such as heartburn.     · You do not get better as expected. Where can you learn more?   Go to http://pablo-cyndi.info/. Enter L146 in the search box to learn more about \"Bill's Esophagus: Care Instructions. \"  Current as of: March 27, 2018  Content Version: 11.9  © 0492-4427 [employer]. Care instructions adapted under license by [employer] (which disclaims liability or warranty for this information). If you have questions about a medical condition or this instruction, always ask your healthcare professional. [place] [address] any warranty or liability for your use of this information. DISCHARGE SUMMARY from Nurse    PATIENT INSTRUCTIONS:    After general anesthesia or intravenous sedation, for 24 hours or while taking prescription Narcotics:  · Limit your activities  · Do not drive and operate hazardous machinery  · Do not make important personal or business decisions  · Do  not drink alcoholic beverages  · If you have not urinated within 8 hours after discharge, please contact your surgeon on call. Report the following to your surgeon:  · Excessive pain, swelling, redness or odor of or around the surgical area  · Temperature over 100.5  · Nausea and vomiting lasting longer than 4 hours or if unable to take medications  · Any signs of decreased circulation or nerve impairment to extremity: change in color, persistent  numbness, tingling, coldness or increase pain  · Any questions    *  Please give a list of your current medications to your Primary Care Provider. *  Please update this list whenever your medications are discontinued, doses are      changed, or new medications (including over-the-counter products) are added. *  Please carry medication information at all times in case of emergency situations.     These are general instructions for a healthy lifestyle:    No smoking/ No tobacco products/ Avoid exposure to second hand smoke  Surgeon General's Warning:  Quitting smoking now greatly reduces serious risk to your health. Obesity, smoking, and sedentary lifestyle greatly increases your risk for illness    A healthy diet, regular physical exercise & weight monitoring are important for maintaining a healthy lifestyle    You may be retaining fluid if you have a history of heart failure or if you experience any of the following symptoms:  Weight gain of 3 pounds or more overnight or 5 pounds in a week, increased swelling in our hands or feet or shortness of breath while lying flat in bed. Please call your doctor as soon as you notice any of these symptoms; do not wait until your next office visit. Recognize signs and symptoms of STROKE:    F-face looks uneven    A-arms unable to move or move unevenly    S-speech slurred or non-existent    T-time-call 911 as soon as signs and symptoms begin-DO NOT go       Back to bed or wait to see if you get better-TIME IS BRAIN. Warning Signs of HEART ATTACK     Call 911 if you have these symptoms:   Chest discomfort. Most heart attacks involve discomfort in the center of the chest that lasts more than a few minutes, or that goes away and comes back. It can feel like uncomfortable pressure, squeezing, fullness, or pain.  Discomfort in other areas of the upper body. Symptoms can include pain or discomfort in one or both arms, the back, neck, jaw, or stomach.  Shortness of breath with or without chest discomfort.  Other signs may include breaking out in a cold sweat, nausea, or lightheadedness. Don't wait more than five minutes to call 911 - MINUTES MATTER! Fast action can save your life. Calling 911 is almost always the fastest way to get lifesaving treatment. Emergency Medical Services staff can begin treatment when they arrive -- up to an hour sooner than if someone gets to the hospital by car. The discharge information has been reviewed with the patient and spouse. The patient and spouse verbalized understanding.   Discharge medications reviewed with the patient and spouse and appropriate educational materials and side effects teaching were provided.   ___________________________________________________________________________________________________________________________________

## 2019-03-26 NOTE — ANESTHESIA PREPROCEDURE EVALUATION
Relevant Problems No relevant active problems Anesthetic History No history of anesthetic complications Review of Systems / Medical History Pulmonary Within defined limits Neuro/Psych Within defined limits Cardiovascular Hypertension: well controlled CAD and cardiac stents GI/Hepatic/Renal 
  
 
 
 
 
 
 Endo/Other Morbid obesity and anemia Other Findings Physical Exam 
 
Airway Mallampati: II 
TM Distance: 4 - 6 cm Neck ROM: normal range of motion Mouth opening: Normal 
 
 Cardiovascular Rhythm: regular Rate: normal 
 
 
 
 Dental 
No notable dental hx Pulmonary Breath sounds clear to auscultation Abdominal 
GI exam deferred Other Findings Anesthetic Plan ASA: 3 Anesthesia type: MAC Anesthetic plan and risks discussed with: Patient and Spouse

## 2019-03-26 NOTE — ANESTHESIA POSTPROCEDURE EVALUATION
Procedure(s): UPPER ENDOSCOPY with Bx's. MAC Anesthesia Post Evaluation Multimodal analgesia: multimodal analgesia used between 6 hours prior to anesthesia start to PACU discharge Patient location during evaluation: bedside Patient participation: complete - patient participated Level of consciousness: awake Pain management: adequate Airway patency: patent Anesthetic complications: no 
Cardiovascular status: acceptable Respiratory status: acceptable Hydration status: acceptable Post anesthesia nausea and vomiting:  none Vitals Value Taken Time /64 3/26/2019  1:13 PM  
Temp 36.6 °C (97.8 °F) 3/26/2019 12:53 PM  
Pulse 64 3/26/2019  1:14 PM  
Resp 13 3/26/2019  1:14 PM  
SpO2 98 % 3/26/2019  1:14 PM  
Vitals shown include unvalidated device data.

## 2020-01-07 ENCOUNTER — OFFICE VISIT (OUTPATIENT)
Dept: CARDIOLOGY CLINIC | Age: 65
End: 2020-01-07

## 2020-01-07 VITALS
HEART RATE: 85 BPM | DIASTOLIC BLOOD PRESSURE: 58 MMHG | OXYGEN SATURATION: 96 % | WEIGHT: 255 LBS | BODY MASS INDEX: 35.7 KG/M2 | SYSTOLIC BLOOD PRESSURE: 127 MMHG | HEIGHT: 71 IN

## 2020-01-07 DIAGNOSIS — I25.119 ATHEROSCLEROSIS OF NATIVE CORONARY ARTERY OF NATIVE HEART WITH ANGINA PECTORIS (HCC): ICD-10-CM

## 2020-01-07 DIAGNOSIS — G47.33 OSA (OBSTRUCTIVE SLEEP APNEA): ICD-10-CM

## 2020-01-07 DIAGNOSIS — R00.2 PALPITATION: ICD-10-CM

## 2020-01-07 DIAGNOSIS — I51.7 CARDIOMEGALY: ICD-10-CM

## 2020-01-07 DIAGNOSIS — Z98.61 POSTSURGICAL PERCUTANEOUS TRANSLUMINAL CORONARY ANGIOPLASTY STATUS: ICD-10-CM

## 2020-01-07 DIAGNOSIS — R06.02 SOBOE (SHORTNESS OF BREATH ON EXERTION): Primary | ICD-10-CM

## 2020-01-07 RX ORDER — FUROSEMIDE 20 MG/1
20 TABLET ORAL DAILY
Qty: 30 TAB | Refills: 3 | Status: SHIPPED | OUTPATIENT
Start: 2020-01-07 | End: 2020-01-07 | Stop reason: SDUPTHER

## 2020-01-07 RX ORDER — METOPROLOL TARTRATE 50 MG/1
50 TABLET ORAL 2 TIMES DAILY
Qty: 60 TAB | Refills: 3 | Status: SHIPPED | OUTPATIENT
Start: 2020-01-07 | End: 2020-01-07 | Stop reason: SDUPTHER

## 2020-01-07 RX ORDER — ASPIRIN 325 MG
325 TABLET, DELAYED RELEASE (ENTERIC COATED) ORAL
Qty: 100 TAB | Refills: 3 | Status: SHIPPED | OUTPATIENT
Start: 2020-01-07 | End: 2020-01-07 | Stop reason: SDUPTHER

## 2020-01-07 RX ORDER — ROSUVASTATIN CALCIUM 20 MG/1
20 TABLET, COATED ORAL
Qty: 30 TAB | Refills: 3 | Status: SHIPPED | OUTPATIENT
Start: 2020-01-07 | End: 2020-04-30 | Stop reason: DRUGHIGH

## 2020-01-07 RX ORDER — FUROSEMIDE 20 MG/1
20 TABLET ORAL DAILY
Qty: 30 TAB | Refills: 3 | Status: SHIPPED | OUTPATIENT
Start: 2020-01-07 | End: 2020-01-17 | Stop reason: SDUPTHER

## 2020-01-07 RX ORDER — ROSUVASTATIN CALCIUM 20 MG/1
20 TABLET, COATED ORAL
Qty: 30 TAB | Refills: 3 | Status: SHIPPED | OUTPATIENT
Start: 2020-01-07 | End: 2020-01-07 | Stop reason: SDUPTHER

## 2020-01-07 RX ORDER — METOPROLOL TARTRATE 50 MG/1
50 TABLET ORAL 2 TIMES DAILY
Qty: 60 TAB | Refills: 3 | Status: SHIPPED | OUTPATIENT
Start: 2020-01-07 | End: 2020-10-07

## 2020-01-07 RX ORDER — ASPIRIN 325 MG
325 TABLET, DELAYED RELEASE (ENTERIC COATED) ORAL
Qty: 100 TAB | Refills: 3 | Status: SHIPPED | OUTPATIENT
Start: 2020-01-07 | End: 2020-01-23

## 2020-01-07 NOTE — PROGRESS NOTES
1. Have you been to the ER, urgent care clinic since your last visit? Hospitalized since your last visit? Yes Where: [facility] Reason for visit: Shortness of breath    2. Have you seen or consulted any other health care providers outside of the [address] [place] [address] since your last visit? Include any pap smears or colon screening.  No

## 2020-01-07 NOTE — PATIENT INSTRUCTIONS
[employer] Activation    Thank you for requesting access to [employer]. Please follow the instructions below to securely access and download your online medical record. [employer] allows you to send messages to your doctor, view your test results, renew your prescriptions, schedule appointments, and more. How Do I Sign Up? 1. In your internet browser, go to [email or web address]. 2. Click on the First Time User? Click Here link in the Sign In box. You will see the New Member Sign Up page. 3. Enter your [employer] Access Code exactly as it appears below. You will not need to use this code after youve completed the sign-up process. If you do not sign up before the expiration date, you must request a new code. [employer] Access Code: [email or web address]  Expires: 2020 12:57 PM (This is the date your [employer] access code will )    4. Enter the last four digits of your Social Security Number (xxxx) and Date of Birth (mm/dd/yyyy) as indicated and click Submit. You will be taken to the next sign-up page. 5. Create a [employer] ID. This will be your [employer] login ID and cannot be changed, so think of one that is secure and easy to remember. 6. Create a [employer] password. You can change your password at any time. 7. Enter your Password Reset Question and Answer. This can be used at a later time if you forget your password. 8. Enter your e-mail address. You will receive e-mail notification when new information is available in [address]. 9. Click Sign Up. You can now view and download portions of your medical record. 10. Click the Download Summary menu link to download a portable copy of your medical information. Additional Information    If you have questions, please visit the Frequently Asked Questions section of the [employer] website at [email or web address] Remember, [employer] is NOT to be used for urgent needs. For medical emergencies, dial 911.

## 2020-01-07 NOTE — PROGRESS NOTES
HISTORY OF PRESENT ILLNESS  Derek Fox is a 62 y.o. male. 1/2020. Patient seen in office today with increasing shortness of breath started about a week ago which is progressive associated with orthopnea PND. Denies any chest pain but he is short of breath on minimal activity. He has noted palpitation. Cholesterol Problem   The history is provided by the patient. This is a chronic problem. The problem occurs constantly. The problem has not changed since onset. Associated symptoms include shortness of breath. Pertinent negatives include no chest pain, no abdominal pain and no headaches. Shortness of Breath   The history is provided by the patient. This is a new problem. The problem occurs frequently. The problem has been gradually worsening. Pertinent negatives include no fever, no headaches, no cough, no sputum production, no hemoptysis, no wheezing, no PND, no orthopnea, no chest pain, no vomiting, no abdominal pain, no rash, no leg swelling and no claudication. Precipitated by: exertion. He has tried nothing for the symptoms. Associated medical issues include CAD. Associated medical issues do not include COPD or chronic lung disease. Review of Systems   Constitutional: Negative for chills and fever. HENT: Negative for nosebleeds. Eyes: Negative for blurred vision and double vision. Respiratory: Positive for shortness of breath. Negative for cough, hemoptysis, sputum production and wheezing. Cardiovascular: Positive for palpitations. Negative for chest pain, orthopnea, claudication, leg swelling and PND. Gastrointestinal: Negative for abdominal pain, heartburn, nausea and vomiting. Musculoskeletal: Negative for myalgias. Skin: Negative for rash. Neurological: Negative for dizziness, weakness and headaches. Endo/Heme/Allergies: Does not bruise/bleed easily.      Family History   Problem Relation Age of Onset    Heart Attack Father     Heart Disease Neg Hx        Past Medical History:   Diagnosis Date    Coronary artery disease     Coronary atherosclerosis of native coronary artery     Stable, no recent angina    ED (erectile dysfunction)     Elevated blood pressure reading without diagnosis of hypertension     Monitor     Other and unspecified hyperlipidemia     Postsurgical percutaneous transluminal coronary angioplasty status     Eight years since stent, no recent stent testing, EKG normal       Past Surgical History:   Procedure Laterality Date    HX CORONARY STENT PLACEMENT  2004       Social History     Tobacco Use    Smoking status: Former Smoker    Smokeless tobacco: Never Used    Tobacco comment: Remotely quit tobacco use   Substance Use Topics    Alcohol use: Yes     Comment: Occasional       No Known Allergies    Prior to Admission medications    Medication Sig Start Date End Date Taking? Authorizing Provider   metoprolol tartrate (LOPRESSOR) 50 mg tablet Take 1 Tab by mouth two (2) times a day. 1/7/20  Yes Marty Borja MD   aspirin delayed-release 325 mg tablet Take 1 Tab by mouth every six (6) hours as needed for Pain. 1/7/20  Yes Marty Borja MD   furosemide (LASIX) 20 mg tablet Take 1 Tab by mouth daily. 1/7/20  Yes Marty Borja MD   rosuvastatin (CRESTOR) 20 mg tablet Take 1 Tab by mouth nightly. 1/7/20  Yes Marty Borja MD   sildenafil citrate (VIAGRA) 100 mg tablet Take 1 tab 1 hour prior to sexual activity on an empty stomach. Maximum daily dose: 100mg 9/19/19  Yes Avery Santana MD   omega 5-mvc-hpy-fish oil 60- mg cap Take 500 mg by mouth. Yes Provider, Historical   tadalafil (CIALIS) 20 mg tablet Take 1 Tab by mouth daily as needed ([place]). 8/16/19  Yes Pato Freire MD   DOCOSAHEXANOIC ACID/EPA (FISH OIL PO) Take  by mouth daily.    Yes Provider, Historical         Visit Vitals  /58   Pulse 85   Ht 5' 11\" (1.803 m)   Wt 115.7 kg (255 lb)   SpO2 96%   BMI 35.57 kg/m²         Physical Exam   Constitutional: He is oriented to person, place, and time. He appears well-developed and well-nourished. HENT:   Head: Normocephalic and atraumatic. Eyes: Conjunctivae are normal.   Neck: Neck supple. No JVD present. No tracheal deviation present. No thyromegaly present. Cardiovascular: Normal rate and normal heart sounds. An irregular rhythm present. Exam reveals no gallop and no friction rub. No murmur heard. Pulmonary/Chest: Breath sounds normal. No respiratory distress. He has no wheezes. He has no rales. He exhibits no tenderness. Abdominal: Soft. There is no tenderness. Musculoskeletal:         General: No edema. Neurological: He is alert and oriented to person, place, and time. Skin: Skin is warm and dry. Psychiatric: He has a normal mood and affect. Mr. Arielle Becerra has a reminder for a \"due or due soon\" health maintenance. I have asked that he contact his primary care provider for follow-up on this health maintenance. SUMMARY:echo-2017  Left ventricle: Systolic function was normal. Ejection fraction was  estimated in the range of 55 % to 60 %. There were no regional wall motion  abnormalities. There was moderate concentric hypertrophy. COMPARISONS:  Comparison was made with the previous study of 10-Santhosh-2013. LV thickness  has increased. NUCLEAR IMAGIN2017     Findings:   1. Stress images reveal normal Myoview distrubution in all the LV segments in short axis, vertical and horizontal long axis views. 2. Resting images have a normal uptake. 3. Gated images reveal normal wall motion and the ejection fraction is calculated to be 74%. Conclusion:   1. Normal perfusion scan. 2. Normal wall motion and ejection fraction. 3. Low risk scan. Assessment         ICD-10-CM ICD-9-CM    1. SOBOE (shortness of breath on exertion) R06.02 786.05 ECHO ADULT COMPLETE      NUCLEAR CARDIAC STRESS TEST      CARDIAC EVENT MONITOR    New onset progressive shortness of breath.   Tachycardic exam suggestive of paroxysmal A. fib. EKG showing sinus rhythm. Rule out ischemia rule out CHF   2. Atherosclerosis of native coronary artery of native heart with angina pectoris ([facility] [address] I25.119 414.01 ECHO ADULT COMPLETE     413.9 NUCLEAR CARDIAC STRESS TEST      CARDIAC EVENT MONITOR    Increase shortness of breath   3. Postsurgical percutaneous transluminal coronary angioplasty status Z98.61 V45.82     Restart aspirin and statin   4. MELANI (obstructive sleep apnea) G47.33 327.23     On CPAP   5. Palpitation R00.2 785.1 ECHO ADULT COMPLETE      NUCLEAR CARDIAC STRESS TEST      CARDIAC EVENT MONITOR    Rule out atrial arrhythmia   6. Cardiomegaly I51.7 429.3     On x-ray       Medications Discontinued During This Encounter   Medication Reason    ALPRAZolam (XANAX) 0.5 mg tablet Not A Current Medication       Orders Placed This Encounter    metoprolol tartrate (LOPRESSOR) 50 mg tablet     Sig: Take 1 Tab by mouth two (2) times a day. Dispense:  60 Tab     Refill:  3    aspirin delayed-release 325 mg tablet     Sig: Take 1 Tab by mouth every six (6) hours as needed for Pain. Dispense:  100 Tab     Refill:  3    furosemide (LASIX) 20 mg tablet     Sig: Take 1 Tab by mouth daily. Dispense:  30 Tab     Refill:  3    rosuvastatin (CRESTOR) 20 mg tablet     Sig: Take 1 Tab by mouth nightly. Dispense:  30 Tab     Refill:  3       Follow-up and Dispositions    · Return for F/u after tests.

## 2020-01-08 DIAGNOSIS — I25.119 ATHEROSCLEROSIS OF NATIVE CORONARY ARTERY OF NATIVE HEART WITH ANGINA PECTORIS (HCC): ICD-10-CM

## 2020-01-08 DIAGNOSIS — R06.02 SOBOE (SHORTNESS OF BREATH ON EXERTION): ICD-10-CM

## 2020-01-08 DIAGNOSIS — R00.2 PALPITATION: ICD-10-CM

## 2020-01-08 NOTE — TELEPHONE ENCOUNTER
[facility] telemetry called and stated patient has new onset of a-fib. Reading has been upload to website. Please review reading on website and advise any changes.

## 2020-01-08 NOTE — TELEPHONE ENCOUNTER
Called patient to inform of new A. Fib onset. After looking over EKG per Dr. Mary Duke to start on Eliquis 5 mg BID. Patient verbalized understanding and had no further questions.

## 2020-01-15 ENCOUNTER — TELEPHONE (OUTPATIENT)
Dept: CARDIOLOGY CLINIC | Age: 65
End: 2020-01-15

## 2020-01-15 NOTE — TELEPHONE ENCOUNTER
Called and spoke to patient Per Dr. GIBSON [facility], nuclear stress test was normal.

## 2020-01-15 NOTE — TELEPHONE ENCOUNTER
Patient called and stated he would like to know the results of his nuclear before follow up appointment so they would not be worrying about what is going on. Please advise.    [phone]

## 2020-01-16 ENCOUNTER — TELEPHONE (OUTPATIENT)
Dept: CARDIOLOGY CLINIC | Age: 65
End: 2020-01-16

## 2020-01-17 ENCOUNTER — TELEPHONE (OUTPATIENT)
Dept: CARDIOLOGY CLINIC | Age: 65
End: 2020-01-17

## 2020-01-17 RX ORDER — FUROSEMIDE 20 MG/1
20 TABLET ORAL DAILY
Qty: 90 TAB | Refills: 1 | Status: SHIPPED | OUTPATIENT
Start: 2020-01-17 | End: 2020-01-23

## 2020-01-17 RX ORDER — FUROSEMIDE 20 MG/1
20 TABLET ORAL DAILY
Qty: 30 TAB | Refills: 3 | Status: SHIPPED | OUTPATIENT
Start: 2020-01-17 | End: 2020-01-23

## 2020-01-17 NOTE — TELEPHONE ENCOUNTER
Spoke to patient wife to continue lasix and script was sent to pharmacy per Dr. Jorge Keen. She voices understanding and acceptance of this advice and will call back if any further questions or concerns.

## 2020-01-17 NOTE — TELEPHONE ENCOUNTER
Patient wife stated that he was giving lasix 20 mg in the hospital and would like to know if patient should continue the medication. They will need refills for medications.

## 2020-01-20 ENCOUNTER — ANESTHESIA EVENT (OUTPATIENT)
Dept: CARDIOTHORACIC SURGERY | Age: 65
DRG: 271 | End: 2020-01-20
Payer: COMMERCIAL

## 2020-01-20 ENCOUNTER — ANESTHESIA (OUTPATIENT)
Dept: CARDIOTHORACIC SURGERY | Age: 65
DRG: 271 | End: 2020-01-20
Payer: COMMERCIAL

## 2020-01-20 ENCOUNTER — APPOINTMENT (OUTPATIENT)
Dept: GENERAL RADIOLOGY | Age: 65
DRG: 271 | End: 2020-01-20
Attending: PHYSICIAN ASSISTANT
Payer: COMMERCIAL

## 2020-01-20 ENCOUNTER — TELEPHONE (OUTPATIENT)
Dept: CARDIOLOGY CLINIC | Age: 65
End: 2020-01-20

## 2020-01-20 ENCOUNTER — HOSPITAL ENCOUNTER (INPATIENT)
Age: 65
LOS: 3 days | Discharge: HOME | DRG: 271 | End: 2020-01-23
Attending: EMERGENCY MEDICINE | Admitting: HOSPITALIST
Payer: COMMERCIAL

## 2020-01-20 DIAGNOSIS — J90 PLEURAL EFFUSION ON LEFT: ICD-10-CM

## 2020-01-20 DIAGNOSIS — I31.4 PERICARDIAL TAMPONADE: ICD-10-CM

## 2020-01-20 DIAGNOSIS — I31.39 PERICARDIAL EFFUSION: Primary | ICD-10-CM

## 2020-01-20 DIAGNOSIS — Z98.890 S/P PERICARDIAL WINDOW CREATION: ICD-10-CM

## 2020-01-20 LAB
ALBUMIN SERPL-MCNC: 3.1 G/DL (ref 3.4–5)
ALBUMIN/GLOB SERPL: 0.8 {RATIO} (ref 0.8–1.7)
ALP SERPL-CCNC: 155 U/L (ref 45–117)
ALT SERPL-CCNC: 62 U/L (ref 16–61)
ANION GAP SERPL CALC-SCNC: 2 MMOL/L (ref 3–18)
APTT PPP: 32.8 SEC (ref 23–36.4)
AST SERPL-CCNC: 55 U/L (ref 10–38)
ATRIAL RATE: 100 BPM
BASOPHILS # BLD: 0 K/UL (ref 0–0.1)
BASOPHILS NFR BLD: 0 % (ref 0–2)
BILIRUB SERPL-MCNC: 1.8 MG/DL (ref 0.2–1)
BNP SERPL-MCNC: 143 PG/ML (ref 0–900)
BUN SERPL-MCNC: 12 MG/DL (ref 7–18)
BUN/CREAT SERPL: 15 (ref 12–20)
CALCIUM SERPL-MCNC: 8.4 MG/DL (ref 8.5–10.1)
CALCULATED P AXIS, ECG09: 90 DEGREES
CALCULATED R AXIS, ECG10: 54 DEGREES
CALCULATED T AXIS, ECG11: 59 DEGREES
CHLORIDE SERPL-SCNC: 107 MMOL/L (ref 100–111)
CK MB CFR SERPL CALC: 1.6 % (ref 0–4)
CK MB SERPL-MCNC: 1.3 NG/ML (ref 5–25)
CK SERPL-CCNC: 81 U/L (ref 39–308)
CO2 SERPL-SCNC: 30 MMOL/L (ref 21–32)
CREAT SERPL-MCNC: 0.8 MG/DL (ref 0.6–1.3)
DIAGNOSIS, 93000: NORMAL
DIFFERENTIAL METHOD BLD: ABNORMAL
EOSINOPHIL # BLD: 0.1 K/UL (ref 0–0.4)
EOSINOPHIL NFR BLD: 2 % (ref 0–5)
ERYTHROCYTE [DISTWIDTH] IN BLOOD BY AUTOMATED COUNT: 12.8 % (ref 11.6–14.5)
GLOBULIN SER CALC-MCNC: 4 G/DL (ref 2–4)
GLUCOSE SERPL-MCNC: 131 MG/DL (ref 74–99)
HCT VFR BLD AUTO: 36.3 % (ref 36–48)
HGB BLD-MCNC: 12.6 G/DL (ref 13–16)
INR PPP: 1.1 (ref 0.8–1.2)
LACTATE BLD-SCNC: 1.4 MMOL/L (ref 0.4–2)
LYMPHOCYTES # BLD: 0.7 K/UL (ref 0.9–3.6)
LYMPHOCYTES NFR BLD: 12 % (ref 21–52)
MCH RBC QN AUTO: 33.6 PG (ref 24–34)
MCHC RBC AUTO-ENTMCNC: 34.7 G/DL (ref 31–37)
MCV RBC AUTO: 96.8 FL (ref 74–97)
MONOCYTES # BLD: 0.8 K/UL (ref 0.05–1.2)
MONOCYTES NFR BLD: 14 % (ref 3–10)
NEUTS SEG # BLD: 4.2 K/UL (ref 1.8–8)
NEUTS SEG NFR BLD: 72 % (ref 40–73)
P-R INTERVAL, ECG05: 114 MS
PLATELET # BLD AUTO: 112 K/UL (ref 135–420)
PMV BLD AUTO: 10.1 FL (ref 9.2–11.8)
POTASSIUM SERPL-SCNC: 4 MMOL/L (ref 3.5–5.5)
PROT SERPL-MCNC: 7.1 G/DL (ref 6.4–8.2)
PROTHROMBIN TIME: 14.1 SEC (ref 11.5–15.2)
Q-T INTERVAL, ECG07: 358 MS
QRS DURATION, ECG06: 82 MS
QTC CALCULATION (BEZET), ECG08: 461 MS
RBC # BLD AUTO: 3.75 M/UL (ref 4.7–5.5)
SODIUM SERPL-SCNC: 139 MMOL/L (ref 136–145)
TROPONIN I SERPL-MCNC: <0.02 NG/ML (ref 0–0.04)
VENTRICULAR RATE, ECG03: 100 BPM
WBC # BLD AUTO: 5.9 K/UL (ref 4.6–13.2)

## 2020-01-20 PROCEDURE — 85610 PROTHROMBIN TIME: CPT

## 2020-01-20 PROCEDURE — 74011250637 HC RX REV CODE- 250/637: Performed by: PHYSICIAN ASSISTANT

## 2020-01-20 PROCEDURE — 93005 ELECTROCARDIOGRAM TRACING: CPT

## 2020-01-20 PROCEDURE — 0W9D0ZZ DRAINAGE OF PERICARDIAL CAVITY, OPEN APPROACH: ICD-10-PCS | Performed by: THORACIC SURGERY (CARDIOTHORACIC VASCULAR SURGERY)

## 2020-01-20 PROCEDURE — 74011250636 HC RX REV CODE- 250/636

## 2020-01-20 PROCEDURE — 85730 THROMBOPLASTIN TIME PARTIAL: CPT

## 2020-01-20 PROCEDURE — 77030002933 HC SUT MCRYL J&J -A: Performed by: THORACIC SURGERY (CARDIOTHORACIC VASCULAR SURGERY)

## 2020-01-20 PROCEDURE — C1729 CATH, DRAINAGE: HCPCS | Performed by: THORACIC SURGERY (CARDIOTHORACIC VASCULAR SURGERY)

## 2020-01-20 PROCEDURE — 77030018823 HC SLV COMPR VENO -B: Performed by: THORACIC SURGERY (CARDIOTHORACIC VASCULAR SURGERY)

## 2020-01-20 PROCEDURE — 74011000250 HC RX REV CODE- 250: Performed by: ANESTHESIOLOGY

## 2020-01-20 PROCEDURE — 80053 COMPREHEN METABOLIC PANEL: CPT

## 2020-01-20 PROCEDURE — 0W9B30Z DRAINAGE OF LEFT PLEURAL CAVITY WITH DRAINAGE DEVICE, PERCUTANEOUS APPROACH: ICD-10-PCS | Performed by: THORACIC SURGERY (CARDIOTHORACIC VASCULAR SURGERY)

## 2020-01-20 PROCEDURE — 65620000000 HC RM CCU GENERAL

## 2020-01-20 PROCEDURE — 74011250637 HC RX REV CODE- 250/637: Performed by: THORACIC SURGERY (CARDIOTHORACIC VASCULAR SURGERY)

## 2020-01-20 PROCEDURE — 83880 ASSAY OF NATRIURETIC PEPTIDE: CPT

## 2020-01-20 PROCEDURE — 85025 COMPLETE CBC W/AUTO DIFF WBC: CPT

## 2020-01-20 PROCEDURE — 77030040922 HC BLNKT HYPOTHRM STRY -A: Performed by: THORACIC SURGERY (CARDIOTHORACIC VASCULAR SURGERY)

## 2020-01-20 PROCEDURE — 77030018723 HC ELCTRD BLD COVD -A: Performed by: THORACIC SURGERY (CARDIOTHORACIC VASCULAR SURGERY)

## 2020-01-20 PROCEDURE — 87075 CULTR BACTERIA EXCEPT BLOOD: CPT

## 2020-01-20 PROCEDURE — 71045 X-RAY EXAM CHEST 1 VIEW: CPT

## 2020-01-20 PROCEDURE — 76060000034 HC ANESTHESIA 1.5 TO 2 HR: Performed by: THORACIC SURGERY (CARDIOTHORACIC VASCULAR SURGERY)

## 2020-01-20 PROCEDURE — 76010000197 HC CV SURG 1.5 TO 2 HR INTENSV-TIER 1: Performed by: THORACIC SURGERY (CARDIOTHORACIC VASCULAR SURGERY)

## 2020-01-20 PROCEDURE — 02BN0ZZ EXCISION OF PERICARDIUM, OPEN APPROACH: ICD-10-PCS | Performed by: THORACIC SURGERY (CARDIOTHORACIC VASCULAR SURGERY)

## 2020-01-20 PROCEDURE — 77030012390 HC DRN CHST BTL GTNG -B: Performed by: THORACIC SURGERY (CARDIOTHORACIC VASCULAR SURGERY)

## 2020-01-20 PROCEDURE — 77030002982 HC SUT POLYSRB J&J -A: Performed by: THORACIC SURGERY (CARDIOTHORACIC VASCULAR SURGERY)

## 2020-01-20 PROCEDURE — 74011000258 HC RX REV CODE- 258: Performed by: ANESTHESIOLOGY

## 2020-01-20 PROCEDURE — 99284 EMERGENCY DEPT VISIT MOD MDM: CPT

## 2020-01-20 PROCEDURE — 77030002996 HC SUT SLK J&J -A: Performed by: THORACIC SURGERY (CARDIOTHORACIC VASCULAR SURGERY)

## 2020-01-20 PROCEDURE — 74011000250 HC RX REV CODE- 250

## 2020-01-20 PROCEDURE — 77030018390 HC SPNG HEMSTAT2 J&J -B: Performed by: THORACIC SURGERY (CARDIOTHORACIC VASCULAR SURGERY)

## 2020-01-20 PROCEDURE — 71046 X-RAY EXAM CHEST 2 VIEWS: CPT

## 2020-01-20 PROCEDURE — 86900 BLOOD TYPING SEROLOGIC ABO: CPT

## 2020-01-20 PROCEDURE — 88305 TISSUE EXAM BY PATHOLOGIST: CPT

## 2020-01-20 PROCEDURE — 77030031139 HC SUT VCRL2 J&J -A: Performed by: THORACIC SURGERY (CARDIOTHORACIC VASCULAR SURGERY)

## 2020-01-20 PROCEDURE — 87040 BLOOD CULTURE FOR BACTERIA: CPT

## 2020-01-20 PROCEDURE — 86923 COMPATIBILITY TEST ELECTRIC: CPT

## 2020-01-20 PROCEDURE — 74011250636 HC RX REV CODE- 250/636: Performed by: PHYSICIAN ASSISTANT

## 2020-01-20 PROCEDURE — 83605 ASSAY OF LACTIC ACID: CPT

## 2020-01-20 PROCEDURE — 87070 CULTURE OTHR SPECIMN AEROBIC: CPT

## 2020-01-20 PROCEDURE — 77030003666 HC NDL SPINAL BD -A: Performed by: THORACIC SURGERY (CARDIOTHORACIC VASCULAR SURGERY)

## 2020-01-20 PROCEDURE — 74011250636 HC RX REV CODE- 250/636: Performed by: ANESTHESIOLOGY

## 2020-01-20 PROCEDURE — 82550 ASSAY OF CK (CPK): CPT

## 2020-01-20 PROCEDURE — 77030040361 HC SLV COMPR DVT MDII -B: Performed by: THORACIC SURGERY (CARDIOTHORACIC VASCULAR SURGERY)

## 2020-01-20 PROCEDURE — B246ZZ4 ULTRASONOGRAPHY OF RIGHT AND LEFT HEART, TRANSESOPHAGEAL: ICD-10-PCS | Performed by: ANESTHESIOLOGY

## 2020-01-20 PROCEDURE — 77030008462 HC STPLR SKN PROX J&J -A: Performed by: THORACIC SURGERY (CARDIOTHORACIC VASCULAR SURGERY)

## 2020-01-20 RX ORDER — ASPIRIN 325 MG
325 TABLET ORAL DAILY
Status: DISCONTINUED | OUTPATIENT
Start: 2020-01-20 | End: 2020-01-21

## 2020-01-20 RX ORDER — NALOXONE HYDROCHLORIDE 0.4 MG/ML
0.4 INJECTION, SOLUTION INTRAMUSCULAR; INTRAVENOUS; SUBCUTANEOUS AS NEEDED
Status: DISCONTINUED | OUTPATIENT
Start: 2020-01-20 | End: 2020-01-23 | Stop reason: HOSPADM

## 2020-01-20 RX ORDER — HYDROCODONE BITARTRATE AND ACETAMINOPHEN 5; 325 MG/1; MG/1
1-2 TABLET ORAL
Status: DISCONTINUED | OUTPATIENT
Start: 2020-01-20 | End: 2020-01-23 | Stop reason: HOSPADM

## 2020-01-20 RX ORDER — MORPHINE SULFATE 2 MG/ML
2 INJECTION, SOLUTION INTRAMUSCULAR; INTRAVENOUS
Status: DISCONTINUED | OUTPATIENT
Start: 2020-01-20 | End: 2020-01-20

## 2020-01-20 RX ORDER — BISACODYL 5 MG
10 TABLET, DELAYED RELEASE (ENTERIC COATED) ORAL DAILY PRN
Status: DISCONTINUED | OUTPATIENT
Start: 2020-01-20 | End: 2020-01-23 | Stop reason: HOSPADM

## 2020-01-20 RX ORDER — OXYCODONE AND ACETAMINOPHEN 5; 325 MG/1; MG/1
1-2 TABLET ORAL
Status: DISCONTINUED | OUTPATIENT
Start: 2020-01-20 | End: 2020-01-20

## 2020-01-20 RX ORDER — MIDAZOLAM HYDROCHLORIDE 1 MG/ML
INJECTION, SOLUTION INTRAMUSCULAR; INTRAVENOUS AS NEEDED
Status: DISCONTINUED | OUTPATIENT
Start: 2020-01-20 | End: 2020-01-20 | Stop reason: HOSPADM

## 2020-01-20 RX ORDER — FENTANYL CITRATE 50 UG/ML
50 INJECTION, SOLUTION INTRAMUSCULAR; INTRAVENOUS ONCE
Status: DISCONTINUED | OUTPATIENT
Start: 2020-01-20 | End: 2020-01-20

## 2020-01-20 RX ORDER — FENTANYL CITRATE 50 UG/ML
INJECTION, SOLUTION INTRAMUSCULAR; INTRAVENOUS
Status: COMPLETED
Start: 2020-01-20 | End: 2020-01-20

## 2020-01-20 RX ORDER — LABETALOL HCL 20 MG/4 ML
10 SYRINGE (ML) INTRAVENOUS ONCE
Status: DISCONTINUED | OUTPATIENT
Start: 2020-01-20 | End: 2020-01-20

## 2020-01-20 RX ORDER — SODIUM CHLORIDE, SODIUM LACTATE, POTASSIUM CHLORIDE, CALCIUM CHLORIDE 600; 310; 30; 20 MG/100ML; MG/100ML; MG/100ML; MG/100ML
INJECTION, SOLUTION INTRAVENOUS
Status: DISCONTINUED | OUTPATIENT
Start: 2020-01-20 | End: 2020-01-20 | Stop reason: HOSPADM

## 2020-01-20 RX ORDER — ONDANSETRON 2 MG/ML
4 INJECTION INTRAMUSCULAR; INTRAVENOUS
Status: DISCONTINUED | OUTPATIENT
Start: 2020-01-20 | End: 2020-01-23 | Stop reason: HOSPADM

## 2020-01-20 RX ORDER — SODIUM CHLORIDE 0.9 % (FLUSH) 0.9 %
5-40 SYRINGE (ML) INJECTION EVERY 8 HOURS
Status: DISCONTINUED | OUTPATIENT
Start: 2020-01-20 | End: 2020-01-23 | Stop reason: HOSPADM

## 2020-01-20 RX ORDER — MORPHINE SULFATE 2 MG/ML
4 INJECTION, SOLUTION INTRAMUSCULAR; INTRAVENOUS
Status: DISCONTINUED | OUTPATIENT
Start: 2020-01-20 | End: 2020-01-22

## 2020-01-20 RX ORDER — ALBUTEROL SULFATE 0.83 MG/ML
SOLUTION RESPIRATORY (INHALATION)
Status: DISPENSED
Start: 2020-01-20 | End: 2020-01-21

## 2020-01-20 RX ORDER — ROSUVASTATIN CALCIUM 20 MG/1
20 TABLET, COATED ORAL
Status: DISCONTINUED | OUTPATIENT
Start: 2020-01-20 | End: 2020-01-23 | Stop reason: HOSPADM

## 2020-01-20 RX ORDER — ROCURONIUM BROMIDE 10 MG/ML
INJECTION, SOLUTION INTRAVENOUS AS NEEDED
Status: DISCONTINUED | OUTPATIENT
Start: 2020-01-20 | End: 2020-01-20 | Stop reason: HOSPADM

## 2020-01-20 RX ORDER — METOPROLOL TARTRATE 50 MG/1
50 TABLET ORAL EVERY 12 HOURS
Status: DISCONTINUED | OUTPATIENT
Start: 2020-01-20 | End: 2020-01-23 | Stop reason: HOSPADM

## 2020-01-20 RX ORDER — PROPOFOL 10 MG/ML
INJECTION, EMULSION INTRAVENOUS AS NEEDED
Status: DISCONTINUED | OUTPATIENT
Start: 2020-01-20 | End: 2020-01-20 | Stop reason: HOSPADM

## 2020-01-20 RX ORDER — HEPARIN SODIUM 5000 [USP'U]/ML
5000 INJECTION, SOLUTION INTRAVENOUS; SUBCUTANEOUS EVERY 12 HOURS
Status: DISCONTINUED | OUTPATIENT
Start: 2020-01-20 | End: 2020-01-23 | Stop reason: HOSPADM

## 2020-01-20 RX ORDER — KETAMINE HYDROCHLORIDE 10 MG/ML
INJECTION, SOLUTION INTRAMUSCULAR; INTRAVENOUS AS NEEDED
Status: DISCONTINUED | OUTPATIENT
Start: 2020-01-20 | End: 2020-01-20 | Stop reason: HOSPADM

## 2020-01-20 RX ORDER — KETOROLAC TROMETHAMINE 15 MG/ML
15 INJECTION, SOLUTION INTRAMUSCULAR; INTRAVENOUS
Status: DISPENSED | OUTPATIENT
Start: 2020-01-20 | End: 2020-01-21

## 2020-01-20 RX ORDER — SODIUM CHLORIDE 0.9 % (FLUSH) 0.9 %
5-40 SYRINGE (ML) INJECTION AS NEEDED
Status: DISCONTINUED | OUTPATIENT
Start: 2020-01-20 | End: 2020-01-23 | Stop reason: HOSPADM

## 2020-01-20 RX ORDER — LABETALOL HCL 20 MG/4 ML
SYRINGE (ML) INTRAVENOUS
Status: COMPLETED
Start: 2020-01-20 | End: 2020-01-20

## 2020-01-20 RX ORDER — ETOMIDATE 2 MG/ML
INJECTION INTRAVENOUS AS NEEDED
Status: DISCONTINUED | OUTPATIENT
Start: 2020-01-20 | End: 2020-01-20 | Stop reason: HOSPADM

## 2020-01-20 RX ORDER — GLYCOPYRROLATE 0.2 MG/ML
INJECTION INTRAMUSCULAR; INTRAVENOUS AS NEEDED
Status: DISCONTINUED | OUTPATIENT
Start: 2020-01-20 | End: 2020-01-20 | Stop reason: HOSPADM

## 2020-01-20 RX ORDER — NEOSTIGMINE METHYLSULFATE 1 MG/ML
INJECTION, SOLUTION INTRAVENOUS AS NEEDED
Status: DISCONTINUED | OUTPATIENT
Start: 2020-01-20 | End: 2020-01-20 | Stop reason: HOSPADM

## 2020-01-20 RX ORDER — NITROGLYCERIN 40 MG/100ML
INJECTION INTRAVENOUS
Status: DISPENSED
Start: 2020-01-20 | End: 2020-01-21

## 2020-01-20 RX ORDER — FENTANYL CITRATE 50 UG/ML
INJECTION, SOLUTION INTRAMUSCULAR; INTRAVENOUS AS NEEDED
Status: DISCONTINUED | OUTPATIENT
Start: 2020-01-20 | End: 2020-01-20 | Stop reason: HOSPADM

## 2020-01-20 RX ORDER — CEFAZOLIN SODIUM 2 G/50ML
2 SOLUTION INTRAVENOUS EVERY 8 HOURS
Status: COMPLETED | OUTPATIENT
Start: 2020-01-20 | End: 2020-01-21

## 2020-01-20 RX ORDER — SUCCINYLCHOLINE CHLORIDE 20 MG/ML
INJECTION INTRAMUSCULAR; INTRAVENOUS AS NEEDED
Status: DISCONTINUED | OUTPATIENT
Start: 2020-01-20 | End: 2020-01-20 | Stop reason: HOSPADM

## 2020-01-20 RX ORDER — METOPROLOL TARTRATE 50 MG/1
50 TABLET ORAL 2 TIMES DAILY
Status: DISCONTINUED | OUTPATIENT
Start: 2020-01-20 | End: 2020-01-20

## 2020-01-20 RX ORDER — CEFAZOLIN SODIUM 2 G/50ML
2 SOLUTION INTRAVENOUS
Status: DISCONTINUED | OUTPATIENT
Start: 2020-01-20 | End: 2020-01-20 | Stop reason: HOSPADM

## 2020-01-20 RX ADMIN — Medication 10 ML: at 16:00

## 2020-01-20 RX ADMIN — CEFAZOLIN SODIUM 2 G: 2 SOLUTION INTRAVENOUS at 18:16

## 2020-01-20 RX ADMIN — CEFAZOLIN SODIUM 2 G: 1 INJECTION, POWDER, FOR SOLUTION INTRAMUSCULAR; INTRAVENOUS at 13:55

## 2020-01-20 RX ADMIN — FENTANYL CITRATE 50 MCG: 50 INJECTION, SOLUTION INTRAMUSCULAR; INTRAVENOUS at 16:00

## 2020-01-20 RX ADMIN — HEPARIN SODIUM 5000 UNITS: 5000 INJECTION INTRAVENOUS; SUBCUTANEOUS at 18:16

## 2020-01-20 RX ADMIN — CEFAZOLIN SODIUM 2 G: 2 SOLUTION INTRAVENOUS at 23:34

## 2020-01-20 RX ADMIN — ROCURONIUM BROMIDE 30 MG: 10 INJECTION, SOLUTION INTRAVENOUS at 13:59

## 2020-01-20 RX ADMIN — METOPROLOL TARTRATE 50 MG: 50 TABLET, FILM COATED ORAL at 20:13

## 2020-01-20 RX ADMIN — MORPHINE SULFATE 4 MG: 2 INJECTION, SOLUTION INTRAMUSCULAR; INTRAVENOUS at 20:02

## 2020-01-20 RX ADMIN — KETOROLAC TROMETHAMINE 15 MG: 15 INJECTION, SOLUTION INTRAMUSCULAR; INTRAVENOUS at 16:00

## 2020-01-20 RX ADMIN — Medication 10 ML: at 23:35

## 2020-01-20 RX ADMIN — SODIUM CHLORIDE, SODIUM LACTATE, POTASSIUM CHLORIDE, AND CALCIUM CHLORIDE: 600; 310; 30; 20 INJECTION, SOLUTION INTRAVENOUS at 14:13

## 2020-01-20 RX ADMIN — SUGAMMADEX 400 MG: 100 INJECTION, SOLUTION INTRAVENOUS at 15:20

## 2020-01-20 RX ADMIN — Medication 25 MG: at 14:35

## 2020-01-20 RX ADMIN — ASPIRIN 325 MG: 325 TABLET, FILM COATED ORAL at 18:16

## 2020-01-20 RX ADMIN — GLYCOPYRROLATE 0.8 MG: 0.2 INJECTION, SOLUTION INTRAMUSCULAR; INTRAVENOUS at 14:51

## 2020-01-20 RX ADMIN — SODIUM CHLORIDE, SODIUM LACTATE, POTASSIUM CHLORIDE, AND CALCIUM CHLORIDE: 600; 310; 30; 20 INJECTION, SOLUTION INTRAVENOUS at 15:47

## 2020-01-20 RX ADMIN — FENTANYL CITRATE 100 MCG: 50 INJECTION, SOLUTION INTRAMUSCULAR; INTRAVENOUS at 14:12

## 2020-01-20 RX ADMIN — OXYCODONE HYDROCHLORIDE AND ACETAMINOPHEN 1 TABLET: 5; 325 TABLET ORAL at 18:32

## 2020-01-20 RX ADMIN — ROCURONIUM BROMIDE 10 MG: 10 INJECTION, SOLUTION INTRAVENOUS at 14:20

## 2020-01-20 RX ADMIN — FENTANYL CITRATE 50 MCG: 50 INJECTION, SOLUTION INTRAMUSCULAR; INTRAVENOUS at 15:34

## 2020-01-20 RX ADMIN — Medication 5 MG: at 14:51

## 2020-01-20 RX ADMIN — FENTANYL CITRATE 100 MCG: 50 INJECTION, SOLUTION INTRAMUSCULAR; INTRAVENOUS at 13:48

## 2020-01-20 RX ADMIN — PROPOFOL 50 MG: 10 INJECTION, EMULSION INTRAVENOUS at 14:58

## 2020-01-20 RX ADMIN — FENTANYL CITRATE 50 MCG: 50 INJECTION, SOLUTION INTRAMUSCULAR; INTRAVENOUS at 15:22

## 2020-01-20 RX ADMIN — MIDAZOLAM HYDROCHLORIDE 2 MG: 2 INJECTION, SOLUTION INTRAMUSCULAR; INTRAVENOUS at 13:48

## 2020-01-20 RX ADMIN — ROSUVASTATIN CALCIUM 20 MG: 20 TABLET, COATED ORAL at 23:34

## 2020-01-20 RX ADMIN — MORPHINE SULFATE 2 MG: 2 INJECTION, SOLUTION INTRAMUSCULAR; INTRAVENOUS at 18:16

## 2020-01-20 RX ADMIN — FENTANYL CITRATE 50 MCG: 50 INJECTION, SOLUTION INTRAMUSCULAR; INTRAVENOUS at 15:30

## 2020-01-20 RX ADMIN — KETOROLAC TROMETHAMINE 15 MG: 15 INJECTION, SOLUTION INTRAMUSCULAR; INTRAVENOUS at 21:30

## 2020-01-20 RX ADMIN — LABETALOL 20 MG/4 ML (5 MG/ML) INTRAVENOUS SYRINGE: at 16:00

## 2020-01-20 RX ADMIN — Medication 25 MG: at 14:25

## 2020-01-20 RX ADMIN — LABETALOL 20 MG/4 ML (5 MG/ML) INTRAVENOUS SYRINGE 10 MG: at 16:00

## 2020-01-20 RX ADMIN — HYDROCODONE BITARTRATE AND ACETAMINOPHEN 2 TABLET: 5; 325 TABLET ORAL at 23:34

## 2020-01-20 RX ADMIN — ETOMIDATE 20 MG: 2 INJECTION, SOLUTION INTRAVENOUS at 13:46

## 2020-01-20 RX ADMIN — FENTANYL CITRATE: 50 INJECTION, SOLUTION INTRAMUSCULAR; INTRAVENOUS at 16:00

## 2020-01-20 RX ADMIN — FENTANYL CITRATE 50 MCG: 50 INJECTION, SOLUTION INTRAMUSCULAR; INTRAVENOUS at 15:25

## 2020-01-20 RX ADMIN — SODIUM CHLORIDE, SODIUM LACTATE, POTASSIUM CHLORIDE, AND CALCIUM CHLORIDE: 600; 310; 30; 20 INJECTION, SOLUTION INTRAVENOUS at 13:33

## 2020-01-20 RX ADMIN — ONDANSETRON 4 MG: 2 INJECTION INTRAMUSCULAR; INTRAVENOUS at 23:34

## 2020-01-20 RX ADMIN — SUCCINYLCHOLINE CHLORIDE 200 MG: 20 INJECTION, SOLUTION INTRAMUSCULAR; INTRAVENOUS at 13:46

## 2020-01-20 NOTE — PROGRESS NOTES
15:35  Received pt from OR with 2 chest tubes. Pt. Stanley Moctezuma in pain. Anesthesiologist at bedside.   15:40  Labetalol and Fentanyl pulled for anesthesiologist to administer

## 2020-01-20 NOTE — H&P
History & Physical    Patient: Kurt Jason MRN: [id number]  CSN: [id number]    YOB: 1955  Age: 62 y.o. Sex: male      DOA: 1/20/2020       HPI:     Kurt Jason is a 62 y.o. male with a history of CAD and a coronary stent 2004, hyperlipidemia and obstructive sleep apnea. He reports  around 1/3/2020 he started to develop shortness of breath, orthopnea and intermittent fevers. He was evaluated by his PCP and scheduled for CT of the chest.  He was evaluated by his cardiologist, and scheduled for nuclear stress test and an echocardiogram.  He was initiated on metoprolol 50 mg twice daily,  mg daily, Lasix 20 mg daily and rosuvastatin 20 mg daily, cardiac event monitor. O he n 1/8/2025 telemetry called cardiologist; he was found to be in a new atrial fibrillation and was initiated on Eliquis 5 mg twice daily. A  nuclear stress test was reported as normal on 1/15/2020. On 1/16/2020 the patient reports that he received a call from his PCP that the CT that was done a day prior showed fluid around his heart lungs and spleen and directed him to the ER. He presented to [facility] ER. Chest film showed cardiomegaly but no effusions, exam was otherwise unremarkable and the patient was discharged on Lasix to  follow-up with his cardiologist for echocardiogram.     On 1/28/2020 patient had his echocardiogram demonstrated mild concentric hypertrophy with left ventricular ejection fraction 55 to 60% and no regional wall abnormalities moderate grade 2 left ventricular diastolic dysfunction. Severely elevated CVP, and IVC diameter larger than 21 mm the collapses less than 50% with respiration. And a large circumferential pericardial effusion with hemodynamic compromise consistent with cardiac tamponade. Patient was directed immediately to [facility] emergency room with plan for pericardial drainage and possible window.     In the emergency room the patient was placed on cardiac monitor with pacer pads in place and was immediately assessed by Dr. Thelma Walker, [facility]. He reviewed the echocardiogram findings with the patient and his wife and explained the need for a procedure to create a window in the the pericardium to decompress the tamponade. He explained the high risk and additional factors as the patient had last eaten 1 hour prior and has been on Eliquis for atrial fibrillation. Patient consented for the procedures. Cardiology, Dr. Nicholas Lee, was also consulted in the emergency room and will follow. Mr Judith Teresa proceeded to the OR with Dr Thelma Walker.     Past Medical History:   Diagnosis Date    Atrial fibrillation ([address] 01/07/2020    Coronary artery disease     Coronary atherosclerosis of native coronary artery     Stable, no recent angina    ED (erectile dysfunction)     Elevated blood pressure reading without diagnosis of hypertension     Monitor     History of hepatitis C     treated with interferon    MELANI on CPAP     Other and unspecified hyperlipidemia     Platelet inhibition due to Plavix     on Eliquis    Postsurgical percutaneous transluminal coronary angioplasty status     Eight years since stent, no recent stent testing, EKG normal       Past Surgical History:   Procedure Laterality Date    HX CORONARY STENT PLACEMENT  2004       Family History   Problem Relation Age of Onset    Heart Attack Father     Heart Disease Neg Hx        Social History     Socioeconomic History    Marital status:      Spouse name: Not on file    Number of children: Not on file    Years of education: Not on file    Highest education level: Not on file   Tobacco Use    Smoking status: Former Smoker     Last attempt to quit: 2010     Years since quitting: 10.0    Smokeless tobacco: Former User    Tobacco comment: Remotely quit tobacco use 2010   Substance and Sexual Activity    Alcohol use: Yes     Comment: Occasional       Prior to Admission medications    Medication Sig Start Date End Date Taking? Authorizing Provider   furosemide (LASIX) 20 mg tablet Take 1 Tab by mouth daily. 1/17/20   Equilla Aschoff, MD   furosemide (LASIX) 20 mg tablet Take 1 Tab by mouth daily. 1/17/20   Equilla Aschoff, MD   apixaban (ELIQUIS) 5 mg tablet Take 1 Tab by mouth two (2) times a day. 1/8/20   Equilla Aschoff, MD   metoprolol tartrate (LOPRESSOR) 50 mg tablet Take 1 Tab by mouth two (2) times a day. 1/7/20   Equilla Aschoff, MD   aspirin delayed-release 325 mg tablet Take 1 Tab by mouth every six (6) hours as needed for Pain. 1/7/20   Equilla Aschoff, MD   rosuvastatin (CRESTOR) 20 mg tablet Take 1 Tab by mouth nightly. 1/7/20   Equilla Aschoff, MD   sildenafil citrate (VIAGRA) 100 mg tablet Take 1 tab 1 hour prior to sexual activity on an empty stomach. Maximum daily dose: 100mg 9/19/19   Aliyah Montoya MD   omega 0-adj-pnh-fish oil 60- mg cap Take 500 mg by mouth. Provider, Historical   tadalafil (CIALIS) 20 mg tablet Take 1 Tab by mouth daily as needed ([place]). 8/16/19   Arun Bustillo MD   DOCOSAHEXANOIC ACID/EPA (FISH OIL PO) Take  by mouth daily. Provider, Historical       No Known Allergies    Review of Systems:  12 point review of systems was performed with pertinent positives as listed in the HPI. Physical Exam:      Visit Vitals  /84   Pulse 99   Temp 98.2 °F (36.8 °C)   Resp 29   SpO2 97%       Physical Exam:  Patient post op in the [facility]U after emergent placement of pericardial window and drains. Dr Jyoti Contreras and EPIFANIO May and RN Jenna Fitzgerald at bedside. Dr Ty San administering Fentanyl dosing IV for control of immediate post op pain. Labetalol IV for hypertension. . Patient's cardiac event monitor was in sterile bag at the bedside table with the OR notes.     GENERAL: fatigued, pale  HEENT patient extubated, O2 mask, speech clear and goal-directed,  Conjunctiva clear, anicteric, tracking everyone in room  Chest Chest tube pericardial cavity and left chest tube to   Pleur-evac -20 cm water suction. Anterior chest sounds clear, heart rate regular 99  Abdomen abdomen soft, no guarding  Extremities moving all 4 extremities in bed, feet warm, + pulses    Lab/Data Review:  Labs: Results:       Chemistry Recent Labs     01/20/20  1210   *      K 4.0      CO2 30   BUN 12   CREA 0.80   CA 8.4*   AGAP 2*   BUCR 15   *   TP 7.1   ALB 3.1*   GLOB 4.0   AGRAT 0.8      CBC w/Diff Recent Labs     01/20/20  1210   WBC 5.9   RBC 3.75*   HGB 12.6*   HCT 36.3   *   GRANS 72   LYMPH 12*   EOS 2      Coagulation Recent Labs     01/20/20  1210   PTP 14.1   INR 1.1   APTT 32.8       Iron/Ferritin No results for input(s): IRON in the last 72 hours. No lab exists for component: TIBCCALC   BNP No results for input(s): BNPP in the last 72 hours. Cardiac Enzymes Recent Labs     01/20/20  1210   CPK 81   CKND1 1.6      Liver Enzymes Recent Labs     01/20/20  1210   TP 7.1   ALB 3.1*   *   SGOT 55*      Thyroid Studies No results found for: T4, T3U, TSH, TSHEXT       All Micro Results     Procedure Component Value Units Date/Time    CULTURE, BODY FLUID Haley Reasons STAIN [[id number]] Collected:  01/20/20 1434    Order Status:  Completed Specimen:  Pericardial Fluid Updated:  01/20/20 9860     Special Requests: NO SPECIAL REQUESTS        GRAM STAIN RARE WBC'S         NO ORGANISMS SEEN        Culture result: PENDING    CULTURE, BLOOD [[id number]] Collected:  01/20/20 1303    Order Status:  Completed Specimen:  Blood Updated:  01/20/20 1344    CULTURE, BLOOD [[id number]] Collected:  01/20/20 1210    Order Status:  Completed Specimen:  Blood Updated:  01/20/20 1215        Echocardiogram 1/20/2020  Interpretation Summary     Result status: Final result   · Normal cavity size and systolic function (ejection fraction normal). Mild concentric hypertrophy. Estimated left ventricular ejection fraction is 55 - 60%. No regional wall motion abnormality noted.  Moderate (grade 2) left ventricular diastolic dysfunction. · No hemodynamically significant valvular pathology. · Severely elevated central venous pressure (15+ mmHg); IVC diameter is larger than 21 mm and collapses less than 50% with respiration. · Large circumferential pericardial effusion. Hemodynamic compromise consistent with cardiac tamponade. Imaging Reviewed:  Chest Xray 1/20/2020  FINDINGS:  Moderate to marked enlargement of the cardiac silhouette. Battery device  overlying the left mid chest. No pneumothorax. Bandlike opacity left midlung. Small pleural effusions. Subcentimeter granuloma projects in the left upper  lung. Intact osseous structures. Mild thoracic spondylosis.     IMPRESSION  IMPRESSION:     Moderate to marked enlargement of the cardiac silhouette, due to pericardial  effusion seen on echocardiogram.  Small pleural effusions. Left midlung bandlike subsegmental atelectasis, infiltrate not excluded.       Assessment:   Principal Problem:    Pericardial effusion with cardiac tamponade (1/20/2020)    Active Problems:    Cardiac tamponade (1/20/2020)      Pleural effusion, left (1/20/2020)      Paroxysmal atrial fibrillation ([address] (1/8/2020)      Obstructive sleep apnea (1/20/2020)      CAD S/P percutaneous coronary angioplasty (1/22/2020)      Overview: Stent 2004        Plan:   Patient admitted to the CV CCU  Dr. Batsheva Stevens, and EPIFANIO [place] CT surgery following closely  Cardiology following  Control pain  Monitor CBC, BMP Mag Phos  received 10 mg of IV labetalol for hypertension-now controlled  Resume home Lopressor 50 mg twice daily and rosuvastatin 20 mg at bedtime when taking p.o. when okay with cardiology    Cardiac Event monitor was removed in surgery   Re-address for discharge    Full Code      Shahzad Almonte DO  1/20/2020, 6:25 PM

## 2020-01-20 NOTE — ED PROVIDER NOTES
EMERGENCY DEPARTMENT HISTORY AND PHYSICAL EXAM    12:46 PM  Date: 1/20/2020  Patient Name: Chidi Thomas    History of Presenting Illness     Chief Complaint   Patient presents with    Shortness of Breath        History Provided By: Patient    HPI: Chidi Thomas is a 62 y.o. male with history multiple medical problems as below. Patient was sent from cardiology clinic by Dr. Vance Wu after he had an echo this morning that showed large pericardial effusion concerning for tamponade. Patient had been complaining of shortness of breath and nonproductive cough as well as low-grade fever for about 3 weeks now. Had an outpatient chest x-ray that showed an enlarged heart and then he was referred to cardiology. He had a nuclear stress test and a 0 patch placed last week. Had an echo done today. Denies any chest pain or syncope. His symptoms are worse when he is having a fever but not affected by laying down or walking. Is been taking Tylenol for his fever. Denies any recent upper respiratory illness prior to the symptoms started or any drug use. Location:  Severity:  Timing/course:    Onset/Duration:     PCP: Juliette Simpson MD    Past History     Past Medical History:  Past Medical History:   Diagnosis Date    Coronary artery disease     Coronary atherosclerosis of native coronary artery     Stable, no recent angina    ED (erectile dysfunction)     Elevated blood pressure reading without diagnosis of hypertension     Monitor     Other and unspecified hyperlipidemia     Postsurgical percutaneous transluminal coronary angioplasty status     Eight years since stent, no recent stent testing, EKG normal       Past Surgical History:  Past Surgical History:   Procedure Laterality Date    HX CORONARY STENT PLACEMENT  2004       Family History:  Family History   Problem Relation Age of Onset    Heart Attack Father     Heart Disease Neg Hx        Social History:  Social History     Tobacco Use    Smoking status: Former Smoker    Smokeless tobacco: Never Used    Tobacco comment: Remotely quit tobacco use   Substance Use Topics    Alcohol use: Yes     Comment: Occasional    Drug use: Not on file       Allergies:  No Known Allergies    Review of Systems   Review of Systems   Respiratory: Positive for cough, chest tightness and shortness of breath. All other systems reviewed and are negative. Physical Exam     Patient Vitals for the past 12 hrs:   Temp Pulse Resp BP SpO2   01/20/20 1227     97 %   01/20/20 1157  96 17  98 %   01/20/20 1156    161/74    01/20/20 1108    156/78    01/20/20 1105 97.5 °F (36.4 °C) 99 20  97 %       Physical Exam  Vitals signs and nursing note reviewed. Constitutional:       Appearance: He is well-developed. HENT:      Head: Normocephalic and atraumatic. Eyes:      Extraocular Movements: Extraocular movements intact. Neck:      Musculoskeletal: Normal range of motion and neck supple. Cardiovascular:      Rate and Rhythm: Normal rate. Pulmonary:      Effort: Tachypnea present. Breath sounds: Normal breath sounds. Musculoskeletal: Normal range of motion. Skin:     General: Skin is warm and dry. Neurological:      General: No focal deficit present. Mental Status: He is alert and oriented to person, place, and time.    Psychiatric:         Mood and Affect: Mood normal.         Behavior: Behavior normal.         Diagnostic Study Results     Labs -  Recent Results (from the past 12 hour(s))   ECHO ADULT COMPLETE    Collection Time: 01/20/20  9:59 AM   Result Value Ref Range    LA Volume 71.06 18 - 58 mL    Ao Root D 3.29 cm    LA Vol Index 30.39 16 - 28 ml/m2    AO ASC D 3.41 cm    Aortic Valve Systolic Peak Velocity 901.30 cm/s    AoV PG 13.4 mmHg    LVIDd 5.21 4.2 - 5.9 cm    LVPWd 1.16 (A) 0.6 - 1.0 cm    LVIDs 2.73 cm    IVSd 1.23 (A) 0.6 - 1.0 cm    LVOT Peak Velocity 142.02 cm/s    LVOT Peak Gradient 8.1 mmHg    LV E' Septal Velocity 8.66 cm/s    LV E' Lateral Velocity 6.84 cm/s    MVA (PHT) 3.7 cm2    MV A Francisco 75.47 cm/s    MV E Francisco 82.02 cm/s    MV E/A 1.09     RVIDd 3.85 cm    Inferior Vena Cava Diameter Sniffing 2.35 cm    LA Vol 4C 59.32 (A) 18 - 58 mL    LA Vol 2C 71.53 (A) 18 - 58 mL    LA Area 2C 23.42 cm2    LA Area 4C 20.7 cm2    LV Mass .6 (A) 88 - 224 g    LV Mass AL Index 126.4 49 - 115 g/m2    E/E' lateral 11.99     E/E' septal 9.47     Right Atrial Area 4C 14.1 cm2    MV \"A\" wave duration 114.0 msec    E/E' ratio (averaged) 10.73     Mitral Valve E Wave Deceleration Time 202.5 ms    Mitral Valve Pressure Half-time 58.7 ms    Left Atrium Major Axis 3.99 cm    Tapse 1.75 1.5 - 2.0 cm    Triscuspid Valve Regurgitation Peak Gradient 10.9 mmHg    Pulmonic Valve Max Velocity 116.67 cm/s    TR Max Velocity 164.96 cm/s    LA Vol Index 30.59 16 - 28 ml/m2    LA Vol Index 25.37 16 - 28 ml/m2    Left Ventricular Fractional Shortening by 2D 15.529089179 %    Mitral Valve Deceleration Aurora 5.5845240182     AV Velocity Ratio 0.78     Left Ventricular End Diastolic Volume by Teichholz Method 9.07089455501 mL    Left Ventricular End Systolic Volume by Teichholz Method 7.90885665104 mL    Left Ventricular Stroke Volume by Teichholz Method 58.070003748 mL    PV peak gradient 5.4 mmHg   EKG, 12 LEAD, INITIAL    Collection Time: 01/20/20 11:00 AM   Result Value Ref Range    Ventricular Rate 100 BPM    Atrial Rate 100 BPM    P-R Interval 114 ms    QRS Duration 82 ms    Q-T Interval 358 ms    QTC Calculation (Bezet) 461 ms    Calculated P Axis 90 degrees    Calculated R Axis 54 degrees    Calculated T Axis 59 degrees    Diagnosis       Normal sinus rhythm  Nonspecific ST and T wave abnormality  Abnormal ECG  No previous ECGs available     CBC WITH AUTOMATED DIFF    Collection Time: 01/20/20 12:10 PM   Result Value Ref Range    WBC 5.9 4.6 - 13.2 K/uL    RBC 3.75 (L) 4.70 - 5.50 M/uL    HGB 12.6 (L) 13.0 - 16.0 g/dL    HCT 36.3 36.0 - 48.0 % MCV 96.8 74.0 - 97.0 FL    MCH 33.6 24.0 - 34.0 PG    MCHC 34.7 31.0 - 37.0 g/dL    RDW 12.8 11.6 - 14.5 %    PLATELET 876 (L) 542 - 420 K/uL    MPV 10.1 9.2 - 11.8 FL    NEUTROPHILS 72 40 - 73 %    LYMPHOCYTES 12 (L) 21 - 52 %    MONOCYTES 14 (H) 3 - 10 %    EOSINOPHILS 2 0 - 5 %    BASOPHILS 0 0 - 2 %    ABS. NEUTROPHILS 4.2 1.8 - 8.0 K/UL    ABS. LYMPHOCYTES 0.7 (L) 0.9 - 3.6 K/UL    ABS. MONOCYTES 0.8 0.05 - 1.2 K/UL    ABS. EOSINOPHILS 0.1 0.0 - 0.4 K/UL    ABS. BASOPHILS 0.0 0.0 - 0.1 K/UL    DF AUTOMATED     METABOLIC PANEL, COMPREHENSIVE    Collection Time: 01/20/20 12:10 PM   Result Value Ref Range    Sodium 139 136 - 145 mmol/L    Potassium 4.0 3.5 - 5.5 mmol/L    Chloride 107 100 - 111 mmol/L    CO2 30 21 - 32 mmol/L    Anion gap 2 (L) 3.0 - 18 mmol/L    Glucose 131 (H) 74 - 99 mg/dL    BUN 12 7.0 - 18 MG/DL    Creatinine 0.80 0.6 - 1.3 MG/DL    BUN/Creatinine ratio 15 12 - 20      GFR est AA >60 >60 ml/min/1.73m2    GFR est non-AA >60 >60 ml/min/1.73m2    Calcium 8.4 (L) 8.5 - 10.1 MG/DL    Bilirubin, total 1.8 (H) 0.2 - 1.0 MG/DL    ALT (SGPT) 62 (H) 16 - 61 U/L    AST (SGOT) 55 (H) 10 - 38 U/L    Alk.  phosphatase 155 (H) 45 - 117 U/L    Protein, total 7.1 6.4 - 8.2 g/dL    Albumin 3.1 (L) 3.4 - 5.0 g/dL    Globulin 4.0 2.0 - 4.0 g/dL    A-G Ratio 0.8 0.8 - 1.7     NT-PRO BNP    Collection Time: 01/20/20 12:10 PM   Result Value Ref Range    NT pro- 0 - 900 PG/ML   CARDIAC PANEL,(CK, CKMB & TROPONIN)    Collection Time: 01/20/20 12:10 PM   Result Value Ref Range    CK 81 39 - 308 U/L    CK - MB 1.3 <3.6 ng/ml    CK-MB Index 1.6 0.0 - 4.0 %    Troponin-I, QT <0.02 0.0 - 0.045 NG/ML   POC LACTIC ACID    Collection Time: 01/20/20 12:19 PM   Result Value Ref Range    Lactic Acid (POC) 1.40 0.40 - 2.00 mmol/L       Radiologic Studies -   Xr Chest Pa Lat    Result Date: 1/20/2020  IMPRESSION: Moderate to marked enlargement of the cardiac silhouette, due to pericardial effusion seen on echocardiogram. Small pleural effusions. Left midlung bandlike subsegmental atelectasis, infiltrate not excluded. Medical Decision Making     ED Course: Progress Notes, Reevaluation, and Consults:    12:46 PM Initial assessment performed. The patients presenting problems have been discussed, and they/their family are in agreement with the care plan formulated and outlined with them. I have encouraged them to ask questions as they arise throughout their visit. Provider Notes (Medical Decision Making): 79-year-old male presenting with shortness of breath for 3 weeks, incidentally found to have large pericardial effusion concerning for cardiac tamponade on an echo this morning. He is hemodynamically stable however with minimal movement he gets diaphoretic and short of breath. Bedside ultrasound showed diastolic collapse of the RV as well as large effusion. He is maintaining good blood pressure and heart rate. Cardiology and cardiac surgery were immediately notified. Patient was placed on a monitor and pacer pads as well. Procedures:     Critical Care Time:     Vital Signs-Reviewed the patient's vital signs. Reviewed pt's pulse ox reading. EKG: Interpreted by the EP. Time Interpreted:    Rate:    Rhythm:    Interpretation:   Comparison:     Records Reviewed: Nursing Notes (Time of Review: 12:46 PM)  -I am the first provider for this patient.  -I reviewed the vital signs, available nursing notes, past medical history, past surgical history, family history and social history. Current Outpatient Medications   Medication Sig Dispense Refill    furosemide (LASIX) 20 mg tablet Take 1 Tab by mouth daily. 90 Tab 1    furosemide (LASIX) 20 mg tablet Take 1 Tab by mouth daily. 30 Tab 3    apixaban (ELIQUIS) 5 mg tablet Take 1 Tab by mouth two (2) times a day. 90 Tab 3    metoprolol tartrate (LOPRESSOR) 50 mg tablet Take 1 Tab by mouth two (2) times a day.  60 Tab 3    aspirin delayed-release 325 mg tablet Take 1 Tab by mouth every six (6) hours as needed for Pain. 100 Tab 3    rosuvastatin (CRESTOR) 20 mg tablet Take 1 Tab by mouth nightly. 30 Tab 3    sildenafil citrate (VIAGRA) 100 mg tablet Take 1 tab 1 hour prior to sexual activity on an empty stomach. Maximum daily dose: 100mg 30 Tab 6    omega 3-dha-epa-fish oil 60- mg cap Take 500 mg by mouth.  tadalafil (CIALIS) 20 mg tablet Take 1 Tab by mouth daily as needed (Tool). 18 Tab 3    DOCOSAHEXANOIC ACID/EPA (FISH OIL PO) Take  by mouth daily. Clinical Impression     Clinical Impression: No diagnosis found. Disposition: Admit    This note was dictated utilizing voice recognition software which may lead to typographical errors. I apologize in advance if the situation occurs. If questions arise please do not hesitate to contact me or call our department.     Gudelia Kruger MD  12:46 PM

## 2020-01-20 NOTE — PROGRESS NOTES
Patient had office echo showing a very large effusion with tamponade physiology. This is new finding. Patient is referred to [facility] emergency room as he will require pericardial drainage and possible window.

## 2020-01-20 NOTE — ED TRIAGE NOTES
Pt reports shortness of breath, more with exertion, since January 3. Sent by his cardiologist to ED for evaluation. Denies chest pain, nausea or vomiting.

## 2020-01-20 NOTE — ANESTHESIA PREPROCEDURE EVALUATION
Relevant Problems   No relevant active problems       Anesthetic History   No history of anesthetic complications            Review of Systems / Medical History  Patient summary reviewed and nursing notes reviewed    Pulmonary        Sleep apnea: CPAP           Neuro/Psych   Within defined limits           Cardiovascular            Dysrhythmias   CAD and cardiac stents      Comments: Large pericardial effusion with tamponade physiology     GI/Hepatic/Renal  Within defined limits              Endo/Other  Within defined limits           Other Findings              Physical Exam    Airway  Mallampati: III  TM Distance: 4 - 6 cm  Neck ROM: normal range of motion   Mouth opening: Normal     Cardiovascular               Dental  No notable dental hx       Pulmonary                 Abdominal         Other Findings            Anesthetic Plan    ASA: 4, emergent  Anesthesia type: general    Monitoring Plan: Arterial line      Induction: Intravenous  Anesthetic plan and risks discussed with: Patient

## 2020-01-20 NOTE — ANESTHESIA POSTPROCEDURE EVALUATION
Procedure(s):  PERICARDIAL WINDOW AND DRAINAGE. general    Anesthesia Post Evaluation      Multimodal analgesia: multimodal analgesia used between 6 hours prior to anesthesia start to PACU discharge  Patient location during evaluation: ICU  Patient participation: complete - patient participated  Level of consciousness: awake and alert  Pain score: 5  Pain management: adequate  Airway patency: patent  Anesthetic complications: no  Cardiovascular status: acceptable  Respiratory status: acceptable  Hydration status: acceptable  Post anesthesia nausea and vomiting:  none      Vitals Value Taken Time   BP     Temp     Pulse 90 1/20/2020  3:51 PM   Resp 27 1/20/2020  3:51 PM   SpO2 96 % 1/20/2020  3:51 PM   Vitals shown include unvalidated device data.

## 2020-01-20 NOTE — ED NOTES
I performed a brief evaluation, including history and physical, of the patient here in triage and I have determined that pt will need further treatment and evaluation from the main side ER physician. I have placed initial orders to help in expediting patients care.      January 20, 2020 at 11:13 AM - Elena Johnson PA-C        Visit Vitals  /78   Pulse 99   Temp 97.5 °F (36.4 °C)   Resp 20   SpO2 97%

## 2020-01-20 NOTE — CONSULTS
[facility] - Consult Note    Date of  Admission: 1/20/2020 11:09 AM     Primary Care Physician:  Ming Wells MD     Plan:     1. Pericardial effusion- very large effusion with tamponade physiology. he will require pericardial drainage and possible window. CTS has been consulted by  ED MD  2. SOB-New onset progressive shortness of breath- likely due to large pericardial effusion. 3. Paroxymal Atrial Fibrillation- seen on event monitor 1/7/20- Currently NSR  On Eliquis at home- will monitor   4. CAD- old PCI in 2004  no chest pain no chest pressure   5. MELANI On CPAP  6. Palpitation-better. 7. Cardiomegaly-On x-ray  8. Persistent fevers- per patient since 1/4/20- blood cultures pending   9. Morbid obesity     S/p pericardial window- resume eliquis when okay with surgeon. 01/20/20   ECHO ADULT COMPLETE 01/20/2020 1/20/2020    Narrative · Normal cavity size and systolic function (ejection fraction normal). Mild concentric hypertrophy. Estimated left ventricular ejection fraction   is 55 - 60%. No regional wall motion abnormality noted. Moderate (grade 2)   left ventricular diastolic dysfunction. · No hemodynamically significant valvular pathology. · Severely elevated central venous pressure (15+ mmHg); IVC diameter is   larger than 21 mm and collapses less than 50% with respiration. · Large circumferential pericardial effusion. Hemodynamic compromise   consistent with cardiac tamponade. Signed by: Pardeep Peña MD       01/14/20   NUCLEAR CARDIAC STRESS TEST 01/14/2020 1/14/2020    Narrative · Baseline ECG: Normal sinus rhythm. · Negative stress test.  · Gated SPECT: Left ventricular function post-stress was normal.   Calculated ejection fraction is 86%. There is no evidence of transient   ischemic dilation (TID). The TID ratio is 0.87. · Left ventricular perfusion is normal.  · Negative myocardial perfusion imaging.  Myocardial perfusion imaging   supports a low risk stress test.        Signed by: Eliu Castle MD          XR Results (most recent):  Results from Hospital Encounter encounter on 01/20/20   XR CHEST PA LAT    Narrative EXAM:  XR CHEST PA LAT    INDICATION:   SOB    COMPARISON: None. FINDINGS:  Moderate to marked enlargement of the cardiac silhouette. Battery device  overlying the left mid chest. No pneumothorax. Bandlike opacity left midlung. Small pleural effusions. Subcentimeter granuloma projects in the left upper  lung. Intact osseous structures. Mild thoracic spondylosis. Impression IMPRESSION:    Moderate to marked enlargement of the cardiac silhouette, due to pericardial  effusion seen on echocardiogram.  Small pleural effusions. Left midlung bandlike subsegmental atelectasis, infiltrate not excluded. Assessment:     Hospital Problems  Date Reviewed: 1/7/2020    None               History of Present Illness: This is a 62 y.o. male admitted for large pericardial effusion- patient with PMHx of CAD,hypertension, afib, SOB. The Patient was sent by Dr. Vance Wu after he had an echo this morning that showed large pericardial effusion concerning for tamponade. Patient had been complaining of shortness of breath and nonproductive cough as well as low-grade fever for about 3 weeks now. Had an outpatient chest x-ray that showed Cardiomegaly. Patient reports worsening LOPEZ. the patient denies chest pain chest pressure. Denies dizziness. has had palpitations thatimproved. Reports intermittent low grade fevers and aching he takes tylenol with some improvement.  He is hemodynamically stable CTS team was consulted and they are panning a pericardial window today             Past Medical History:     Past Medical History:   Diagnosis Date    Coronary artery disease     Coronary atherosclerosis of native coronary artery     Stable, no recent angina    ED (erectile dysfunction)     Elevated blood pressure reading without diagnosis of hypertension Monitor     Other and unspecified hyperlipidemia     Postsurgical percutaneous transluminal coronary angioplasty status     Eight years since stent, no recent stent testing, EKG normal         Social History:     Social History     Socioeconomic History    Marital status:      Spouse name: Not on file    Number of children: Not on file    Years of education: Not on file    Highest education level: Not on file   Tobacco Use    Smoking status: Former Smoker    Smokeless tobacco: Never Used    Tobacco comment: Remotely quit tobacco use   Substance and Sexual Activity    Alcohol use: Yes     Comment: Occasional        Family History:     Family History   Problem Relation Age of Onset    Heart Attack Father     Heart Disease Neg Hx         Medications:   No Known Allergies     No current facility-administered medications for this encounter. Current Outpatient Medications   Medication Sig    furosemide (LASIX) 20 mg tablet Take 1 Tab by mouth daily.  furosemide (LASIX) 20 mg tablet Take 1 Tab by mouth daily.  apixaban (ELIQUIS) 5 mg tablet Take 1 Tab by mouth two (2) times a day.  metoprolol tartrate (LOPRESSOR) 50 mg tablet Take 1 Tab by mouth two (2) times a day.  aspirin delayed-release 325 mg tablet Take 1 Tab by mouth every six (6) hours as needed for Pain.  rosuvastatin (CRESTOR) 20 mg tablet Take 1 Tab by mouth nightly.  sildenafil citrate (VIAGRA) 100 mg tablet Take 1 tab 1 hour prior to sexual activity on an empty stomach. Maximum daily dose: 100mg    omega 3-dha-epa-fish oil 60- mg cap Take 500 mg by mouth.  tadalafil (CIALIS) 20 mg tablet Take 1 Tab by mouth daily as needed ([place]).  DOCOSAHEXANOIC ACID/EPA (FISH OIL PO) Take  by mouth daily.         Review Of Systems:           Constitutional:  Fevers   HEENT: No epistaxis, no nasal drainage, no difficulty in swallowing, no redness in eyes  Respiratory:  dyspnea on exertion  Cardiovascular:  dyspnea, palpitations, chronic leg edema  Gastrointestinal: no abd pain, no vomiting, no diarrhea, no bleeding symptoms  Genitourinary: No urinary symptoms or hematuria  Integument/breast: No ulcers or rashes  Musculoskeletal: no muscle pain, no weakness  Neurological: No focal weakness, no seizures, no headaches  Behvioral/Psych: No anxiety, no depression       Physical Exam:     Visit Vitals  /74   Pulse 96   Temp 97.5 °F (36.4 °C)   Resp 17   SpO2 98%     BP Readings from Last 3 Encounters:   01/20/20 161/74   01/20/20 140/74   01/07/20 127/58     Pulse Readings from Last 3 Encounters:   01/20/20 96   01/07/20 85   03/26/19 62     Wt Readings from Last 3 Encounters:   01/20/20 115.7 kg (255 lb)   01/14/20 115.7 kg (255 lb 1.2 oz)   01/07/20 115.7 kg (255 lb)       General:  alert, cooperative, no distress, appears stated age  Skin: Warm and dry, acyanotic, normal color. Head: Normocephalic, atraumatic. Eyes: Sclerae anicteric, conjunctivae without injection. Neck:  nontender, no nuchal rigidity, no masses, no stridor, no carotid bruit, no JVD  Lungs:   diminished breath sounds b/l. Heart:  regular rate and rhythm, S1, S2 distant heart sounds. Abdomen:  abdomen is soft without significant tenderness, masses, organomegaly or guarding  Extremities:  extremities normal, atraumatic, no cyanosis or edema. Peripheral pulses   Neurological: grossly intact. No focal abnormalities, moves all extremities well. Psychiatric Affect: The patient is awake, alert and oriented x3. Ileana Ebbs is interactive and appropriate.      Data Review:     Recent Results (from the past 48 hour(s))   ECHO ADULT COMPLETE    Collection Time: 01/20/20  9:59 AM   Result Value Ref Range    LA Volume 71.06 18 - 58 mL    Ao Root D 3.29 cm    LA Vol Index 30.39 16 - 28 ml/m2    AO ASC D 3.41 cm    Aortic Valve Systolic Peak Velocity 313.42 cm/s    AoV PG 13.4 mmHg    LVIDd 5.21 4.2 - 5.9 cm    LVPWd 1.16 (A) 0.6 - 1.0 cm    LVIDs 2.73 cm IVSd 1.23 (A) 0.6 - 1.0 cm    LVOT Peak Velocity 142.02 cm/s    LVOT Peak Gradient 8.1 mmHg    LV E' Septal Velocity 8.66 cm/s    LV E' Lateral Velocity 6.84 cm/s    MVA (PHT) 3.7 cm2    MV A Francisco 75.47 cm/s    MV E Francisco 82.02 cm/s    MV E/A 1.09     RVIDd 3.85 cm    Inferior Vena Cava Diameter Sniffing 2.35 cm    LA Vol 4C 59.32 (A) 18 - 58 mL    LA Vol 2C 71.53 (A) 18 - 58 mL    LA Area 2C 23.42 cm2    LA Area 4C 20.7 cm2    LV Mass .6 (A) 88 - 224 g    LV Mass AL Index 126.4 49 - 115 g/m2    E/E' lateral 11.99     E/E' septal 9.47     Right Atrial Area 4C 14.1 cm2    MV \"A\" wave duration 114.0 msec    E/E' ratio (averaged) 10.73     Mitral Valve E Wave Deceleration Time 202.5 ms    Mitral Valve Pressure Half-time 58.7 ms    Left Atrium Major Axis 3.99 cm    Tapse 1.75 1.5 - 2.0 cm    Triscuspid Valve Regurgitation Peak Gradient 10.9 mmHg    Pulmonic Valve Max Velocity 116.67 cm/s    TR Max Velocity 164.96 cm/s    LA Vol Index 30.59 16 - 28 ml/m2    LA Vol Index 25.37 16 - 28 ml/m2    Left Ventricular Fractional Shortening by 2D 22.695788226 %    Mitral Valve Deceleration [place] 5.9788494438     AV Velocity Ratio 0.78     Left Ventricular End Diastolic Volume by Teichholz Method 5.71208876596 mL    Left Ventricular End Systolic Volume by Teichholz Method 4.97875684096 mL    Left Ventricular Stroke Volume by Teichholz Method 48.811930823 mL    PV peak gradient 5.4 mmHg   EKG, 12 LEAD, INITIAL    Collection Time: 01/20/20 11:00 AM   Result Value Ref Range    Ventricular Rate 100 BPM    Atrial Rate 100 BPM    P-R Interval 114 ms    QRS Duration 82 ms    Q-T Interval 358 ms    QTC Calculation (Bezet) 461 ms    Calculated P Axis 90 degrees    Calculated R Axis 54 degrees    Calculated T Axis 59 degrees    Diagnosis       Normal sinus rhythm  Nonspecific ST and T wave abnormality  Abnormal ECG  No previous ECGs available         No intake or output data in the 24 hours ending 01/20/20 1219    Cardiographics: EKG Results     Procedure 720 Value Units Date/Time    EKG, 12 LEAD, INITIAL [[id number]]     Order Status:  Sent     EKG, 12 LEAD, INITIAL [[id number]] Collected:  01/20/20 1100    Order Status:  Completed Updated:  01/20/20 1103     Ventricular Rate 100 BPM      Atrial Rate 100 BPM      P-R Interval 114 ms      QRS Duration 82 ms      Q-T Interval 358 ms      QTC Calculation (Bezet) 461 ms      Calculated P Axis 90 degrees      Calculated R Axis 54 degrees      Calculated T Axis 59 degrees      Diagnosis --     Normal sinus rhythm  Nonspecific ST and T wave abnormality  Abnormal ECG  No previous ECGs available          01/20/20   ECHO ADULT COMPLETE 01/20/2020 1/20/2020    Narrative · Normal cavity size and systolic function (ejection fraction normal). Mild concentric hypertrophy. Estimated left ventricular ejection fraction   is 55 - 60%. No regional wall motion abnormality noted. Moderate (grade 2)   left ventricular diastolic dysfunction. · No hemodynamically significant valvular pathology. · Severely elevated central venous pressure (15+ mmHg); IVC diameter is   larger than 21 mm and collapses less than 50% with respiration. · Large circumferential pericardial effusion. Hemodynamic compromise   consistent with cardiac tamponade. Signed by: Cesia Mcdaniel MD         Signed By: Demetrius DESAI Phone [phone] supervised    January 20, 2020      I have independently evaluated and examined the patient. All relevant labs and testing data's are reviewed. Care plan discussed and updated after review.     Kang Bobby MD

## 2020-01-20 NOTE — PROGRESS NOTES
TRANSFER - IN REPORT:    Verbal report received from Lea Frye RN(name) on Sixto Miranda  being received from  Emergency Department(unit) for ordered procedure      Report consisted of patients Situation, Background, Assessment and   Recommendations(SBAR). Information from the following report(s) SBAR, Kardex, Recent Results, Med Rec Status and Pre Procedure Checklist was reviewed with the receiving nurse. Opportunity for questions and clarification was provided. Assessment completed upon patients arrival to unit and care assumed.

## 2020-01-20 NOTE — ED NOTES
Pt was transported via stretcher at this time on monitor and accompanied by 2 RNs. Preop RN given bedside report no questions at this time. Pt stated no further questions. Pt AxOx4 and stable for transport.

## 2020-01-20 NOTE — PROGRESS NOTES
TRANSFER - OUT REPORT:    Verbal report given to Gerri العراقي RN(name) on Katie Tipton  being transferred to [facility] ICU(unit) for routine post - op       Report consisted of patients Situation, Background, Assessment and   Recommendations(SBAR). Information from the following report(s) SBAR, Kardex, OR Summary, Procedure Summary, MAR and Pre Procedure Checklist was reviewed with the receiving nurse. Lines:   Peripheral IV 01/14/20 Right Hand (Active)   Site Assessment Clean, dry, & intact 1/20/2020  2:21 PM   Phlebitis Assessment 0 1/20/2020  2:21 PM   Infiltration Assessment 0 1/20/2020  2:21 PM   Dressing Status Clean, dry, & intact 1/20/2020  2:21 PM   Dressing Type Tape;Transparent 1/20/2020  2:21 PM   Hub Color/Line Status Blue 1/20/2020  2:21 PM   Action Taken Open ports on tubing capped 1/20/2020  2:21 PM   Alcohol Cap Used Yes 1/20/2020  2:21 PM       Peripheral IV 01/20/20 Left Forearm (Active)   Site Assessment Clean, dry, & intact 1/20/2020 12:56 PM   Phlebitis Assessment 0 1/20/2020 12:56 PM   Infiltration Assessment 0 1/20/2020 12:56 PM   Dressing Status Clean, dry, & intact 1/20/2020 12:56 PM   Dressing Type Transparent;Tape 1/20/2020 12:56 PM   Hub Color/Line Status Pink 1/20/2020 12:56 PM   Action Taken Open ports on tubing capped 1/20/2020 12:56 PM   Alcohol Cap Used Yes 1/20/2020 12:56 PM       Arterial Line 01/20/20 Right Radial artery (Active)        Opportunity for questions and clarification was provided.       Patient transported with:  Registered Nurse  Anesthesia Staff  Monitor  Oxygen

## 2020-01-20 NOTE — OP NOTES
THORACIC SURGERY OPERATIVE NOTE    1/20/2020      PREOPERATIVE DIAGNOSIS: Large pericardial effusion with tamponade     POSTOPERATIVE DIAGNOSIS:  Same    PROCEDURE: Emergent subxiphoid pericardial window creation, placement of left pleural chest tube, trans-esophageal echocardiogram interrogation before and after the procedure    ATTENDING SURGEON:  Jimbo Collins MD       ASSISTANT: Talya Her SA. (There was no qualified resident available to assist with this operation.)      ANESTHESIA:  General with endotracheal tube. ANESTHESIOLOGIST: Bismark Quevedo MD     ESTIMATED BLOOD LOSS: Minimal    IMPLANTS: none     SPECIMENS REMOVED: Pericardial fluid was sent for cytology and microbiology, pericardium was sent for permanent pathology. INDICATIONS:  Amparo Bryant is a 62 y.o. male who presents with large pericardial effusion with tamponade physiology on echocardiogram.  Following discussion with patient and wife at bedside in the emergency room, patient was consented for the above procedure. All risks benefits and alternatives of surgical intervention were discussed. FINDINGS: Large pericardial effusion bloody in nature was expressed and drained under pressure with complete resolution on JANETT. One chest tube was placed in the left pleural cavity to drain a moderate pleural effusion, which was of straw-colored in nature. A total of 1250 mL bloody pericardial fluid drained and 550 mL straw-colored pleural fluid drained. PROCEDURE: Patient was brought to the operating room urgently was prepped and draped in standard sterile fashion. Following timeout and 1 inch vertical incision was placed directly over the xiphoid process dissection was carried down with cautery to the skin tissue and muscle and the xiphoid process was resected and bone file was used to smooth the jagged edges.   Using Rultract, the rib cavity was elevated and  pericardial fat was gradually swept bedside revealed a bluish pericardium underneath. Patient was made and bloody pericardial fluid was expressed under pressure and drained completely. Sample was sent for cytology and microbiology. Following complete drainage as confirmed by JANETT and large piece of pericardium was resected measuring about 2 x 4 cm was sent for permanent pathology. The Rultract was removed and hemostasis was examined and achieved by cauterization of the soft tissue. Once we are satisfied with hemostasis one 28 Urdu right angle chest was placed in the pericardial cavity tunneled out through the anterior abdominal wall and was secured to the skin. The wound was closed with running layers of Vicryl's. At this time attention was directed to the left chest wall and a incision was made over the sixth intercostal space anteriorly and dissected through the intercostal space and entered the pleural cavity uneventfully with straw-colored pleural fluid drained under pressure for a total of 550 mL. Thereafter a straight 28 Urdu chest tube was inserted and secured to the skin. Both chest tube was connected to the Pleur-evac and placed on -20 cm water suction. At the end of the procedure instrument and sponge count were accurate per scrub tech. Patient will be expected to be extubated and returned to the ICU in a guarded condition. Family was updated on the conduct of the operation. Denisa Ramirez MD  CTS  .

## 2020-01-20 NOTE — ANESTHESIA PROCEDURE NOTES
JANETT  Date/Time: 1/20/2020 1:55 PM  Ordering Provider: Connie Ramos MD    Procedure Details: probe placement only    Risks and benefits discussed with the patient and plans are to proceed    Procedure Note  Probe inserted after stomach suctioned with ogt. No resistance met during insertion.  atraumatic  Performed by: Valery Jay MD  Authorized by: Valery Jay MD

## 2020-01-20 NOTE — PROGRESS NOTES
I have independently reviewed and examined the patient at bedside. Mr. Jamel Manzanares is atley 65-year old male with history of progressive shortness of breath started around 3 January this year. Following multiple presentation to the emergency room and outpatient, patient eventually had an echo that showed a large pericardial effusion with early tamponade sign. I have long discussion with patient and wife at bedside at the emergency room. In view of his clinical symptoms of severe shortness of breath and very debilitated functional level coupled with echocardiogram finding of tamponade, I consented the patient for an emergent subxiphoid pericardial window creation procedure in order to decompress the tamponade. Risk benefits and alternatives were discussed with patient and wife including bleeding, transfusion, infection, CVA/TIA, death etc. Notably patient last ate at 56 this morning and is on Eliquis for atrial fibrillation. I have spoken to anesthesiologist Dr. Kimi George, and the operating room will be mobilized.     Fela Horowitz MD  CTS             [facility]  -  Consult      1/20/2020    Liza Candelario is a 62 y.o. male who is being seen on consult for pericardial effusion, at  Dr. Evans Coker request.      Assessment:   - Large pericardial effusion with tamponade physiology  - Recent diagnosis of atrial fibrillation on Eliquis  - CAD with PTCA ~15 years ago  -Mild thrombocytopenia -   - BNP wnl    Patient Active Problem List    Diagnosis Date Noted    Pericardial effusion with cardiac tamponade 01/20/2020    Elevated LFTs 07/19/2017    Fever 07/19/2017    Thrombocytopenia ([address] 07/19/2017    Coronary artery disease     Coronary atherosclerosis of native coronary artery     Postsurgical percutaneous transluminal coronary angioplasty status     Other and unspecified hyperlipidemia     Elevated blood pressure reading without diagnosis of hypertension        Plan: 1.  Needs OR today for pericardial window and drainage  2. Follow blood culture    Subjective:     Chief Complaint   Patient presents with    Shortness of Breath       History of Present Illness:   Chris Pires is a 62 y.o. male who presented to ED from Cardiology Dr. Frank Cortez office for pericardial effusion. Patient noticed first episode of shortness of breath and chest pain on 1/3/20. He also noticed fever at that time and took Tylenol. He was seen by Dr. Romulo Gant on 1/7/20. He was noted to be in atrial fibrillation at that time. He was treated with Lasix and started on Eliquis after a nuclear stress test was negative. He reports no change in his shortness of breath. He also went to [place] [id number] ER on 1/16/2020. He subsequently went to Dr. Cherry Gilliam office today. He has also been experiencing dyspnea on exertion and cannot walk a flight of stairs but denies chest pain, chest pressure/discomfort, dizziness, exertional chest pressure/discomfort, irregular heart beats, near-syncope, orthopnea, palpitations, paroxysmal nocturnal dyspnea, syncope, tachypnea. His last meal was about 10:30 AM  Past Medical History:     Past Medical History:   Diagnosis Date    Coronary artery disease     Coronary atherosclerosis of native coronary artery     Stable, no recent angina    ED (erectile dysfunction)     Elevated blood pressure reading without diagnosis of hypertension     Monitor     History of hepatitis C     treated with interferon    MELANI on CPAP     Other and unspecified hyperlipidemia     Postsurgical percutaneous transluminal coronary angioplasty status     Eight years since stent, no recent stent testing, EKG normal       Past Surgical History:     Past Surgical History:   Procedure Laterality Date    HX CORONARY STENT PLACEMENT  2004       Social History:   He  reports that he quit smoking about 10 years ago. He has quit using smokeless tobacco.  He  reports current alcohol use.     Family History:     Family History   Problem Relation Age of Onset    Heart Attack Father     Heart Disease Neg Hx        Allergies and Intolerances:   No Known Allergies    Home Medications:     Present medications include   Current Outpatient Medications   Medication Sig Dispense Refill    furosemide (LASIX) 20 mg tablet Take 1 Tab by mouth daily. 90 Tab 1    furosemide (LASIX) 20 mg tablet Take 1 Tab by mouth daily. 30 Tab 3    apixaban (ELIQUIS) 5 mg tablet Take 1 Tab by mouth two (2) times a day. 90 Tab 3    metoprolol tartrate (LOPRESSOR) 50 mg tablet Take 1 Tab by mouth two (2) times a day. 60 Tab 3    aspirin delayed-release 325 mg tablet Take 1 Tab by mouth every six (6) hours as needed for Pain. 100 Tab 3    rosuvastatin (CRESTOR) 20 mg tablet Take 1 Tab by mouth nightly. 30 Tab 3    sildenafil citrate (VIAGRA) 100 mg tablet Take 1 tab 1 hour prior to sexual activity on an empty stomach. Maximum daily dose: 100mg 30 Tab 6    omega 3-dha-epa-fish oil 60- mg cap Take 500 mg by mouth.  tadalafil (CIALIS) 20 mg tablet Take 1 Tab by mouth daily as needed ([place]). 18 Tab 3    DOCOSAHEXANOIC ACID/EPA (FISH OIL PO) Take  by mouth daily.            Review of Systems:   As in HPI and otherwise 12 point review of systems is negative    Physical Examination:     Visit Vitals  /74   Pulse 96   Temp 97.5 °F (36.4 °C)   Resp 17   SpO2 97%     PHYSICAL EXAMINATION:  Vital signs:   BP Readings from Last 3 Encounters:   20 161/74   20 140/74   20 127/58     Temp (24hrs), Av.5 °F (36.4 °C), Min:97.5 °F (36.4 °C), Max:97.5 °F (36.4 °C)    Wt Readings from Last 3 Encounters:   20 115.7 kg (255 lb)   20 115.7 kg (255 lb 1.2 oz)   20 115.7 kg (255 lb)     Ht Readings from Last 3 Encounters:   20 5' 11\" (1.803 m)   20 5' 11\" (1.803 m)   20 5' 11\" (1.803 m)       General:   awake alert and oriented   Skin:   no rashes or skin lesions noted on limited exam   HEENT:  Normocephalic, atraumatic, PERRL, EOMI, no scleral icterus or pallor; no conjunctival hemmohage;  nasal and oral mucous are moist and without evidence of lesions. No thrush. Dentition good. Neck supple, no bruits. Lymph Nodes:   no cervical, axillary or inguinal adenopathy   Lungs:   non-labored, bilaterally clear to aspiration- no crackles wheezes rales or rhonchi   Heart:  Irrreg, irreg, s1 and s2; no murmurs rubs or gallops, no edema, + pedal pulses   Abdomen:  soft, non-distended, active bowel sounds, no hepatomegaly, no splenomegaly. Appropriate surgical scars for stated surgeries. Non-tender   Genitourinary:  deferred   Extremities:   no clubbing, cyanosis; no joint effusions or swelling; Full ROM of all large joints to the upper and lower extremities; muscle mass appropriate for age   Neurologic:  No gross focal sensory abnormalities; 5/5 muscle strength to upper and lower extremities. Speech appropirate. Cranial nerves intact   Psychiatric:   appropriate and interactive.        Laboratory Data:     Lab Results   Component Value Date/Time    WBC 5.9 01/20/2020 12:10 PM    HGB 12.6 (L) 01/20/2020 12:10 PM    HCT 36.3 01/20/2020 12:10 PM    PLATELET 611 (L) 29/68/2615 12:10 PM     Lab Results   Component Value Date/Time    Sodium 139 01/20/2020 12:10 PM    Potassium 4.0 01/20/2020 12:10 PM    Chloride 107 01/20/2020 12:10 PM    CO2 30 01/20/2020 12:10 PM    Glucose 131 (H) 01/20/2020 12:10 PM    BUN 12 01/20/2020 12:10 PM    Creatinine 0.80 01/20/2020 12:10 PM     No results found for: CHOL, CHOLX, CHLST, CHOLV, HDL, HDLP, LDL, LDLC, DLDLP, TGLX, TRIGL, TRIGP  No results found for: HBA1C, HGBE8, DCK8CPWV, WOL1GXRL, YTX5XHDK  Recent Labs     01/20/20  1210   CA 8.4*   ALB 3.1*   TP 7.1   SGOT 55*   TBILI 1.8*     ABG:  No results found for: PH, PHI, PCO2, PCO2I, PO2, PO2I, HCO3, HCO3I, FIO2, FIO2I  Recent Labs     01/20/20  1210   TROIQ <0.02   CPK 81   CKMB 1.3       EKG: (independently reviewed  EKG Results     Procedure 720 Value Units Date/Time    EKG, 12 LEAD, INITIAL [[id number]]     Order Status:  Sent     EKG, 12 LEAD, INITIAL [[id number]] Collected:  01/20/20 1100    Order Status:  Completed Updated:  01/20/20 1103     Ventricular Rate 100 BPM      Atrial Rate 100 BPM      P-R Interval 114 ms      QRS Duration 82 ms      Q-T Interval 358 ms      QTC Calculation (Bezet) 461 ms      Calculated P Axis 90 degrees      Calculated R Axis 54 degrees      Calculated T Axis 59 degrees      Diagnosis --     Normal sinus rhythm  Nonspecific ST and T wave abnormality  Abnormal ECG  No previous ECGs available            ECHO:   01/20/20   ECHO ADULT COMPLETE 01/20/2020 1/20/2020    Narrative · Normal cavity size and systolic function (ejection fraction normal). Mild concentric hypertrophy. Estimated left ventricular ejection fraction   is 55 - 60%. No regional wall motion abnormality noted. Moderate (grade 2)   left ventricular diastolic dysfunction. · No hemodynamically significant valvular pathology. · Severely elevated central venous pressure (15+ mmHg); IVC diameter is   larger than 21 mm and collapses less than 50% with respiration. · Large circumferential pericardial effusion. Hemodynamic compromise   consistent with cardiac tamponade.         Signed by: MD Alan Cyr PA-C  [facility]  [phone]

## 2020-01-21 ENCOUNTER — APPOINTMENT (OUTPATIENT)
Dept: GENERAL RADIOLOGY | Age: 65
DRG: 271 | End: 2020-01-21
Attending: HOSPITALIST
Payer: COMMERCIAL

## 2020-01-21 LAB
ANION GAP SERPL CALC-SCNC: 6 MMOL/L (ref 3–18)
BUN SERPL-MCNC: 13 MG/DL (ref 7–18)
BUN/CREAT SERPL: 17 (ref 12–20)
CALCIUM SERPL-MCNC: 7.9 MG/DL (ref 8.5–10.1)
CHLORIDE SERPL-SCNC: 107 MMOL/L (ref 100–111)
CO2 SERPL-SCNC: 24 MMOL/L (ref 21–32)
CREAT SERPL-MCNC: 0.78 MG/DL (ref 0.6–1.3)
ERYTHROCYTE [DISTWIDTH] IN BLOOD BY AUTOMATED COUNT: 13.1 % (ref 11.6–14.5)
GLUCOSE SERPL-MCNC: 118 MG/DL (ref 74–99)
HCT VFR BLD AUTO: 37.6 % (ref 36–48)
HGB BLD-MCNC: 12.7 G/DL (ref 13–16)
MCH RBC QN AUTO: 33.2 PG (ref 24–34)
MCHC RBC AUTO-ENTMCNC: 33.8 G/DL (ref 31–37)
MCV RBC AUTO: 98.2 FL (ref 74–97)
PLATELET # BLD AUTO: 162 K/UL (ref 135–420)
PMV BLD AUTO: 10.4 FL (ref 9.2–11.8)
POTASSIUM SERPL-SCNC: 4.7 MMOL/L (ref 3.5–5.5)
RBC # BLD AUTO: 3.83 M/UL (ref 4.7–5.5)
SODIUM SERPL-SCNC: 137 MMOL/L (ref 136–145)
WBC # BLD AUTO: 11.1 K/UL (ref 4.6–13.2)

## 2020-01-21 PROCEDURE — 85027 COMPLETE CBC AUTOMATED: CPT

## 2020-01-21 PROCEDURE — 71045 X-RAY EXAM CHEST 1 VIEW: CPT

## 2020-01-21 PROCEDURE — 74011250637 HC RX REV CODE- 250/637: Performed by: HOSPITALIST

## 2020-01-21 PROCEDURE — 88305 TISSUE EXAM BY PATHOLOGIST: CPT

## 2020-01-21 PROCEDURE — 77030037878 HC DRSG MEPILEX >48IN BORD MOLN -B

## 2020-01-21 PROCEDURE — C9113 INJ PANTOPRAZOLE SODIUM, VIA: HCPCS | Performed by: HOSPITALIST

## 2020-01-21 PROCEDURE — 74011250636 HC RX REV CODE- 250/636: Performed by: HOSPITALIST

## 2020-01-21 PROCEDURE — 80048 BASIC METABOLIC PNL TOTAL CA: CPT

## 2020-01-21 PROCEDURE — 74011000250 HC RX REV CODE- 250: Performed by: HOSPITALIST

## 2020-01-21 PROCEDURE — 88112 CYTOPATH CELL ENHANCE TECH: CPT

## 2020-01-21 PROCEDURE — 77030027138 HC INCENT SPIROMETER -A

## 2020-01-21 PROCEDURE — 65660000004 HC RM CVT STEPDOWN

## 2020-01-21 RX ORDER — GUAIFENESIN 100 MG/5ML
81 LIQUID (ML) ORAL DAILY
Status: DISCONTINUED | OUTPATIENT
Start: 2020-01-22 | End: 2020-01-23 | Stop reason: HOSPADM

## 2020-01-21 RX ADMIN — KETOROLAC TROMETHAMINE 15 MG: 15 INJECTION, SOLUTION INTRAMUSCULAR; INTRAVENOUS at 03:30

## 2020-01-21 RX ADMIN — ROSUVASTATIN CALCIUM 20 MG: 20 TABLET, COATED ORAL at 21:03

## 2020-01-21 RX ADMIN — Medication 10 ML: at 14:30

## 2020-01-21 RX ADMIN — Medication 10 ML: at 06:29

## 2020-01-21 RX ADMIN — KETOROLAC TROMETHAMINE 15 MG: 15 INJECTION, SOLUTION INTRAMUSCULAR; INTRAVENOUS at 15:28

## 2020-01-21 RX ADMIN — HYDROCODONE BITARTRATE AND ACETAMINOPHEN 1 TABLET: 5; 325 TABLET ORAL at 09:13

## 2020-01-21 RX ADMIN — METOPROLOL TARTRATE 50 MG: 50 TABLET, FILM COATED ORAL at 09:13

## 2020-01-21 RX ADMIN — HEPARIN SODIUM 5000 UNITS: 5000 INJECTION INTRAVENOUS; SUBCUTANEOUS at 15:29

## 2020-01-21 RX ADMIN — HYDROCODONE BITARTRATE AND ACETAMINOPHEN 2 TABLET: 5; 325 TABLET ORAL at 21:03

## 2020-01-21 RX ADMIN — ONDANSETRON 4 MG: 2 INJECTION INTRAMUSCULAR; INTRAVENOUS at 15:29

## 2020-01-21 RX ADMIN — ASPIRIN 325 MG: 325 TABLET, FILM COATED ORAL at 09:13

## 2020-01-21 RX ADMIN — Medication 10 ML: at 21:08

## 2020-01-21 RX ADMIN — SODIUM CHLORIDE 40 MG: 9 INJECTION INTRAMUSCULAR; INTRAVENOUS; SUBCUTANEOUS at 21:03

## 2020-01-21 RX ADMIN — BISACODYL 10 MG: 5 TABLET, COATED ORAL at 09:13

## 2020-01-21 RX ADMIN — HYDROCODONE BITARTRATE AND ACETAMINOPHEN 2 TABLET: 5; 325 TABLET ORAL at 15:29

## 2020-01-21 RX ADMIN — ONDANSETRON 4 MG: 2 INJECTION INTRAMUSCULAR; INTRAVENOUS at 21:03

## 2020-01-21 RX ADMIN — KETOROLAC TROMETHAMINE 15 MG: 15 INJECTION, SOLUTION INTRAMUSCULAR; INTRAVENOUS at 09:13

## 2020-01-21 RX ADMIN — METOPROLOL TARTRATE 50 MG: 50 TABLET, FILM COATED ORAL at 21:03

## 2020-01-21 RX ADMIN — SODIUM CHLORIDE 40 MG: 9 INJECTION INTRAMUSCULAR; INTRAVENOUS; SUBCUTANEOUS at 09:14

## 2020-01-21 RX ADMIN — HEPARIN SODIUM 5000 UNITS: 5000 INJECTION INTRAVENOUS; SUBCUTANEOUS at 04:00

## 2020-01-21 NOTE — PROGRESS NOTES
1900-  Report received. 2002-  Morphine 4 mg IVP. 2013-   Lopressor 50 mg PO given early for HTN refer to VS trends. 2100-  CPAP checked by biomed. 2130- Toradol 15 mg IVP. 2334-  Norco 2 tab PO.  0000-  Pt resting. 0330-  Toradol 15 mg IVP. 0400-  Pt resting. 7792-  Arterial line removed. 0530-  Currie removed. Pt OOB to chair  0545-  CXR done. 0600-  Wife at bedside. 0630-  Diet advanced per pt request.  Pt tolerating clear liquid diet. 0700-  Bedside and Verbal shift change report given to Sandy RN (oncoming nurse) by Candelario Nino RN (offgoing nurse). Report included the following information SBAR, Kardex, OR Summary, Intake/Output, MAR, Recent Results and Cardiac Rhythm ST/SR.

## 2020-01-21 NOTE — PROGRESS NOTES
1610: Dr Nitin Yepez at bedside to see patient, inquiring about pathology and cystology results for pericardial fluid. Called lab, specimen in lab, no orders.  Placed orders per Dr Regulo Corley, per lab personnel, specimen will be sent in AM.

## 2020-01-21 NOTE — PROGRESS NOTES
Problem: Falls - Risk of  Goal: *Absence of Falls  Description  Document Bishop Chowdhury Fall Risk and appropriate interventions in the flowsheet. Outcome: Progressing Towards Goal  Note: Fall Risk Interventions:  Mobility Interventions: Communicate number of staff needed for ambulation/transfer, Patient to call before getting OOB, Assess mobility with egress test         Medication Interventions: Evaluate medications/consider consulting pharmacy, Patient to call before getting OOB, Teach patient to arise slowly    Elimination Interventions: Call light in reach              Problem: Patient Education: Go to Patient Education Activity  Goal: Patient/Family Education  Outcome: Progressing Towards Goal     Problem: Pressure Injury - Risk of  Goal: *Prevention of pressure injury  Description  Document Jarod Scale and appropriate interventions in the flowsheet.   Outcome: Progressing Towards Goal  Note: Pressure Injury Interventions:       Moisture Interventions: Absorbent underpads, Internal/External urinary devices, Maintain skin hydration (lotion/cream), Minimize layers    Activity Interventions: Chair cushion, Increase time out of bed, Pressure redistribution bed/mattress(bed type), Assess need for specialty bed    Mobility Interventions: Chair cushion, Float heels, Pressure redistribution bed/mattress (bed type)    Nutrition Interventions: Document food/fluid/supplement intake, Offer support with meals,snacks and hydration    Friction and Shear Interventions: Apply protective barrier, creams and emollients, Lift sheet, Minimize layers                Problem: Patient Education: Go to Patient Education Activity  Goal: Patient/Family Education  Outcome: Progressing Towards Goal

## 2020-01-21 NOTE — PROGRESS NOTES
[facility]      CARDIOLOGY PROGRESS NOTE  RECS:        1. Pericardial effusion- very large effusion with tamponade physiology-S/p pericardial window 1/20/20 - 550 cc bloody fluid removed. CTS following. Ok to continue to hold anticoagulation for now. Patient was on asa 326 every 6 hours PRN for pain. Will obtain limited echo tomorrow to reassess pericardial effusion    2. SOB-New onset progressive shortness of breath- likely due to large pericardial effusion. 3. Paroxymal Atrial Fibrillation- seen on event monitor 1/7/20- Currently NSR  On Eliquis at home- will monitor   4. CAD- old PCI in 2004  no chest pain no chest pressure   5. MELANI On CPAP  6. Palpitation-better. 7. Cardiomegaly-On x-ray  8. Persistent fevers- per patient since 1/4/20- blood cultures pending   9. Morbid obesity      Status post pericardial window. Discussed with nurses and signed the order for cytology of pericardial and pleural fluid as well as pericardial biopsy. Trying to coordinate with pathology department. Hemodynamically he is stable, continue same medications. Hold on anticoagulation as the pericardial effusion is bloody and patient is in sinus rhythm for now. Continue aspirin.           01/20/20   ECHO ADULT COMPLETE 01/20/2020 1/20/2020     Narrative · Normal cavity size and systolic function (ejection fraction normal). Mild concentric hypertrophy. Estimated left ventricular ejection fraction   is 55 - 60%. No regional wall motion abnormality noted. Moderate (grade 2)   left ventricular diastolic dysfunction. · No hemodynamically significant valvular pathology. · Severely elevated central venous pressure (15+ mmHg); IVC diameter is   larger than 21 mm and collapses less than 50% with respiration. · Large circumferential pericardial effusion.  Hemodynamic compromise   consistent with cardiac tamponade.          Signed by: Rosa Chase MD         ASSESSMENT:  Hospital Problems  Date Reviewed: 1/7/2020          Codes Class Noted POA    Pericardial effusion with cardiac tamponade ICD-10-CM: I31.3, I31.4  ICD-9-CM: 423.9, 423.3  1/20/2020 Yes        Cardiac tamponade ICD-10-CM: I31.4  ICD-9-CM: 423.3  1/20/2020 Yes        Pleural effusion, left ICD-10-CM: J90  ICD-9-CM: 511.9  1/20/2020 Yes                SUBJECTIVE:  No CP  Still has some  SOB    OBJECTIVE:    VS:   Visit Vitals  /62   Pulse 75   Temp 97.9 °F (36.6 °C)   Resp 17   Wt 115.3 kg (254 lb 3.2 oz)   SpO2 94%   BMI 35.45 kg/m²         Intake/Output Summary (Last 24 hours) at 1/21/2020 1145  Last data filed at 1/21/2020 1000  Gross per 24 hour   Intake 2780 ml   Output 2060 ml   Net 720 ml     TELE: normal sinus rhythm    General: alert, well developed, pleasant and in no apparent distress  HENT: Normocephalic, atraumatic. Normal external eye. Neck :  JVD difficult to assess due to obesity  Cardiac:  regular rate and rhythm, S1, S2 normal, no murmur, click, rub or gallop  Lungs: diminished breath sounds b/l.    Abdomen: Soft, nontender, no masses  Extremities:  No c/c/e, peripheral pulses present      Labs: Results:       Chemistry Recent Labs     01/21/20  0445 01/20/20  1210   * 131*    139   K 4.7 4.0    107   CO2 24 30   BUN 13 12   CREA 0.78 0.80   CA 7.9* 8.4*   AGAP 6 2*   BUCR 17 15   AP  --  155*   TP  --  7.1   ALB  --  3.1*   GLOB  --  4.0   AGRAT  --  0.8      CBC w/Diff Recent Labs     01/21/20  0445 01/20/20  1210   WBC 11.1 5.9   RBC 3.83* 3.75*   HGB 12.7* 12.6*   HCT 37.6 36.3    112*   GRANS  --  72   LYMPH  --  12*   EOS  --  2      Cardiac Enzymes Recent Labs     01/20/20  1210   CPK 81   CKND1 1.6      Coagulation Recent Labs     01/20/20  1210   PTP 14.1   INR 1.1   APTT 32.8       Lipid Panel No results found for: CHOL, CHOLPOCT, CHOLX, CHLST, CHOLV, 310387, HDL, HDLP, LDL, LDLC, DLDLP, 981786, VLDLC, VLDL, TGLX, TRIGL, TRIGP, TGLPOCT, CHHD, CHHDX   BNP No results for input(s): BNPP in the last 72 hours.    Liver Enzymes Recent Labs     01/20/20  1210   TP 7.1   ALB 3.1*   *   SGOT 55*      Thyroid Studies No results found for: T4, T3U, TSH, TSHEXT           Ирина Sam NP-C [place]:[phone]

## 2020-01-21 NOTE — PROGRESS NOTES
conducted an initial consultation and Spiritual Assessment for Derek Fox, who is a 62 y.o.,male. Patient's Primary Language is: Bruce Pichardo. According to the patient's EMR Voodoo Affiliation is: [facility].     The reason the Patient came to the hospital is:   Patient Active Problem List    Diagnosis Date Noted    Pericardial effusion with cardiac tamponade 01/20/2020    Cardiac tamponade 01/20/2020    Pleural effusion, left 01/20/2020    Elevated LFTs 07/19/2017    Fever 07/19/2017    Thrombocytopenia ([address] 07/19/2017    Coronary artery disease     Coronary atherosclerosis of native coronary artery     Postsurgical percutaneous transluminal coronary angioplasty status     Other and unspecified hyperlipidemia     Elevated blood pressure reading without diagnosis of hypertension         The  provided the following Interventions:  Initiated a relationship of care and support. Explored issues of kristal, belief, spirituality and Lutheran/ritual needs while hospitalized. Listened empathically. Provided chaplaincy education. Provided information about Spiritual Care Services. Offered prayer and assurance of continued prayers on patient's behalf. Chart reviewed. The following outcomes where achieved:  Patient shared limited information about both their medical narrative and spiritual journey/beliefs.  confirmed Patient's Voodoo Affiliation. Patient processed feeling about current hospitalization. Patient expressed gratitude for 's visit. Assessment:  Patient does not have any Lutheran/cultural needs that will affect patient's preferences in health care. There are no spiritual or Lutheran issues which require intervention at this time. Plan:  Chaplains will continue to follow and will provide pastoral care on an as needed/requested basis.    recommends bedside caregivers page  on duty if patient shows signs of acute spiritual or emotional distress.     [id number] [facility]   [phone]

## 2020-01-21 NOTE — PROGRESS NOTES
Postop day 1 emergent subxiphoid pericardial window, placement of left pleural chest tube x1. Vital signs stable, Normal sinus rhythm  Complaint of some gastric distention  Moderate drainage from both chest tubes with no air leak  Chest x-ray within normal limits    Assessment and plan    Continue both chest tubes to suction  Resume aspirin, metoprolol and stCardiology consult is pending to decide if patient needs to be placed back on NOAC In view of bloody pericardial fluid And normal sinus rhythm.   Transfer to stepdown and encourage I-S and ambulation  DC plan when all chest tubes are out    Consuelo Perez MD  CTS

## 2020-01-21 NOTE — PROGRESS NOTES
Reason for Admission:  Cardiac tamponade [I31.4]                 RRAT Score:    5            Plan for utilizing home health:    TBD                      Likelihood of Readmission:   LOW                         Transition of Care Plan:              Initial assessment completed with patient and spouse/SO. Cognitive status of patient: oriented to time, place, person and situation. Face sheet information confirmed:  yes. The patient designates spouse, Magdy Strong to participate in his discharge plan and to receive any needed information. This patient lives in a single family home with patient and wife. Patient is able to navigate steps as needed. Prior to hospitalization, patient was considered to be independent with ADLs/IADLS : yes . Patient has a current ACP document on file: no  The patient and wife will be available to transport patient home upon discharge. The patient already has none reported medical equipment available in the home. Patient is not currently active with home health. Patient has not stayed in a skilled nursing facility or rehab. This patient is on dialysis :no     Freedom of choice signed: no . Currently, the discharge plan is Home. The patient states that he can obtain his medications from the pharmacy, and take his medications as directed. Patient's current insurance is [employer] Management Interventions  PCP Verified by CM: Yes  Mode of Transport at Discharge: Self  Transition of Care Consult (CM Consult): Discharge Planning  Discharge Durable Medical Equipment: No  Physical Therapy Consult: No  Occupational Therapy Consult: No  Speech Therapy Consult: No  Current Support Network: Lives with Spouse  Discharge Location  Discharge Placement: Home        ERNIE Farris, [address]

## 2020-01-21 NOTE — PROGRESS NOTES
0700: Bedside and verbal report received from Chinyere Stewart RN. Pt sitting in recliner watching TV. No complaints of pain at this time. 2 chest tubes noted intact with no airleak, both with serosanguinous drainage. Pt demonstrated use of the IS, pulled about 500. Verbalizes understanding of needed use during every commercial break. 0830: Pt ate about 50% of breakfast. Wife at bedside. Requesting pain medication for mild pain in insertion site of CT. Also addressed pt's concerns regarding the need for a BM. Informed pt that walking and movement will help move his bowels. Pt verbalized understanding, requested to walk, if possible, later in the morning. 1000: Pt sitting in recliner. No complaints of pain and no s/s of distress. Minimal output in both chest tubes. 1130: Pt walked ~250 feet on room air. Pt denied SOB but visibly seemed to work harder to catch his breath while ambulating. 1300: Pt resting in recliner. Family at bedside. No complaints of pain. 1400: Pt ambulated to bathroom. Had 1 small BM.     1530: Pt complained of nausea and mild pain. Norco, toradol and zofran given. 1700: Pt sitting up in bed eating. States his nausea is much better and pain is under control. 1745: No complaints of pain or discomfort. No nausea. ~25% of dinner eaten. Family at bedside. 1900: Bedside and Verbal shift change report given to Chinyere Stewart RN (oncoming nurse) by Shara Covarrubias RN (offgoing nurse). Report included the following information SBAR, Kardex, Intake/Output, MAR, Recent Results and Med Rec Status.

## 2020-01-22 ENCOUNTER — APPOINTMENT (OUTPATIENT)
Dept: NON INVASIVE DIAGNOSTICS | Age: 65
DRG: 271 | End: 2020-01-22
Attending: NURSE PRACTITIONER
Payer: COMMERCIAL

## 2020-01-22 ENCOUNTER — APPOINTMENT (OUTPATIENT)
Dept: GENERAL RADIOLOGY | Age: 65
DRG: 271 | End: 2020-01-22
Attending: HOSPITALIST
Payer: COMMERCIAL

## 2020-01-22 PROBLEM — R79.89 ELEVATED LFTS: Status: RESOLVED | Noted: 2017-07-19 | Resolved: 2020-01-22

## 2020-01-22 PROBLEM — I25.10 CAD S/P PERCUTANEOUS CORONARY ANGIOPLASTY: Chronic | Status: ACTIVE | Noted: 2020-01-22

## 2020-01-22 PROBLEM — I48.0 PAROXYSMAL ATRIAL FIBRILLATION (HCC): Chronic | Status: ACTIVE | Noted: 2020-01-08

## 2020-01-22 PROBLEM — G47.33 OBSTRUCTIVE SLEEP APNEA: Chronic | Status: ACTIVE | Noted: 2020-01-20

## 2020-01-22 PROBLEM — Z98.61 CAD S/P PERCUTANEOUS CORONARY ANGIOPLASTY: Chronic | Status: ACTIVE | Noted: 2020-01-22

## 2020-01-22 PROBLEM — D69.6 THROMBOCYTOPENIA (HCC): Status: RESOLVED | Noted: 2017-07-19 | Resolved: 2020-01-22

## 2020-01-22 PROBLEM — R50.9 FEVER: Status: RESOLVED | Noted: 2017-07-19 | Resolved: 2020-01-22

## 2020-01-22 LAB
ANION GAP SERPL CALC-SCNC: 4 MMOL/L (ref 3–18)
BASOPHILS # BLD: 0 K/UL (ref 0–0.1)
BASOPHILS NFR BLD: 0 % (ref 0–2)
BUN SERPL-MCNC: 11 MG/DL (ref 7–18)
BUN/CREAT SERPL: 15 (ref 12–20)
CALCIUM SERPL-MCNC: 8.6 MG/DL (ref 8.5–10.1)
CHLORIDE SERPL-SCNC: 106 MMOL/L (ref 100–111)
CO2 SERPL-SCNC: 27 MMOL/L (ref 21–32)
CREAT SERPL-MCNC: 0.72 MG/DL (ref 0.6–1.3)
DIFFERENTIAL METHOD BLD: ABNORMAL
EOSINOPHIL # BLD: 0.3 K/UL (ref 0–0.4)
EOSINOPHIL NFR BLD: 3 % (ref 0–5)
ERYTHROCYTE [DISTWIDTH] IN BLOOD BY AUTOMATED COUNT: 13.2 % (ref 11.6–14.5)
GLUCOSE SERPL-MCNC: 109 MG/DL (ref 74–99)
HCT VFR BLD AUTO: 40.2 % (ref 36–48)
HGB BLD-MCNC: 13.8 G/DL (ref 13–16)
LYMPHOCYTES # BLD: 1.1 K/UL (ref 0.9–3.6)
LYMPHOCYTES NFR BLD: 10 % (ref 21–52)
MAGNESIUM SERPL-MCNC: 2.1 MG/DL (ref 1.6–2.6)
MCH RBC QN AUTO: 33.7 PG (ref 24–34)
MCHC RBC AUTO-ENTMCNC: 34.3 G/DL (ref 31–37)
MCV RBC AUTO: 98 FL (ref 74–97)
MONOCYTES # BLD: 1.4 K/UL (ref 0.05–1.2)
MONOCYTES NFR BLD: 12 % (ref 3–10)
NEUTS SEG # BLD: 8.3 K/UL (ref 1.8–8)
NEUTS SEG NFR BLD: 75 % (ref 40–73)
PLATELET # BLD AUTO: 170 K/UL (ref 135–420)
PMV BLD AUTO: 10.1 FL (ref 9.2–11.8)
POTASSIUM SERPL-SCNC: 4.3 MMOL/L (ref 3.5–5.5)
RBC # BLD AUTO: 4.1 M/UL (ref 4.7–5.5)
SODIUM SERPL-SCNC: 137 MMOL/L (ref 136–145)
WBC # BLD AUTO: 11.1 K/UL (ref 4.6–13.2)

## 2020-01-22 PROCEDURE — 74011250636 HC RX REV CODE- 250/636: Performed by: HOSPITALIST

## 2020-01-22 PROCEDURE — C9113 INJ PANTOPRAZOLE SODIUM, VIA: HCPCS | Performed by: HOSPITALIST

## 2020-01-22 PROCEDURE — 97161 PT EVAL LOW COMPLEX 20 MIN: CPT

## 2020-01-22 PROCEDURE — 65660000004 HC RM CVT STEPDOWN

## 2020-01-22 PROCEDURE — 80048 BASIC METABOLIC PNL TOTAL CA: CPT

## 2020-01-22 PROCEDURE — 74011250637 HC RX REV CODE- 250/637: Performed by: HOSPITALIST

## 2020-01-22 PROCEDURE — 83735 ASSAY OF MAGNESIUM: CPT

## 2020-01-22 PROCEDURE — 71045 X-RAY EXAM CHEST 1 VIEW: CPT

## 2020-01-22 PROCEDURE — 97116 GAIT TRAINING THERAPY: CPT

## 2020-01-22 PROCEDURE — 85025 COMPLETE CBC W/AUTO DIFF WBC: CPT

## 2020-01-22 PROCEDURE — 74011000250 HC RX REV CODE- 250: Performed by: HOSPITALIST

## 2020-01-22 PROCEDURE — 97165 OT EVAL LOW COMPLEX 30 MIN: CPT

## 2020-01-22 PROCEDURE — 36415 COLL VENOUS BLD VENIPUNCTURE: CPT

## 2020-01-22 PROCEDURE — 74011250637 HC RX REV CODE- 250/637: Performed by: NURSE PRACTITIONER

## 2020-01-22 PROCEDURE — 97530 THERAPEUTIC ACTIVITIES: CPT

## 2020-01-22 RX ADMIN — ROSUVASTATIN CALCIUM 20 MG: 20 TABLET, COATED ORAL at 20:55

## 2020-01-22 RX ADMIN — METOPROLOL TARTRATE 50 MG: 50 TABLET, FILM COATED ORAL at 09:40

## 2020-01-22 RX ADMIN — SODIUM CHLORIDE 40 MG: 9 INJECTION INTRAMUSCULAR; INTRAVENOUS; SUBCUTANEOUS at 20:55

## 2020-01-22 RX ADMIN — Medication 10 ML: at 20:56

## 2020-01-22 RX ADMIN — METOPROLOL TARTRATE 50 MG: 50 TABLET, FILM COATED ORAL at 20:54

## 2020-01-22 RX ADMIN — HYDROCODONE BITARTRATE AND ACETAMINOPHEN 2 TABLET: 5; 325 TABLET ORAL at 23:45

## 2020-01-22 RX ADMIN — SODIUM CHLORIDE 40 MG: 9 INJECTION INTRAMUSCULAR; INTRAVENOUS; SUBCUTANEOUS at 09:40

## 2020-01-22 RX ADMIN — ASPIRIN 81 MG 81 MG: 81 TABLET ORAL at 09:40

## 2020-01-22 RX ADMIN — Medication 10 ML: at 07:00

## 2020-01-22 NOTE — PROGRESS NOTES
[facility]      CARDIOLOGY PROGRESS NOTE  RECS:        1. Pericardial effusion- very large effusion with tamponade physiology-S/p pericardial window 1/20/20 - 550 cc bloody fluid removed. CTS following. Chest tubes were removed today. Will obtain limited echo tomorrow to reassess pericardial effusion. If no significant reaccumulation, possible discharge tomorrow. 2. SOB-New onset progressive shortness of breath- likely due to large pericardial effusion. 3. Paroxymal Atrial Fibrillation- seen on event monitor 1/7/20- Currently NSR  On Eliquis at home- will monitor. Restart anticoagulation in another 2 to 3 days if no significant recurrence of pericardial effusion. 4. CAD- old PCI in 2004  no chest pain no chest pressure   5. MELANI On CPAP  6. Palpitation-better. 7. Cardiomegaly-On x-ray  8. Persistent fevers- per patient since 1/4/20- blood cultures pending   9. Morbid obesity      Pericardial fluid and pericardial biopsy analysis are pending.           01/20/20   ECHO ADULT COMPLETE 01/20/2020 1/20/2020     Narrative · Normal cavity size and systolic function (ejection fraction normal). Mild concentric hypertrophy. Estimated left ventricular ejection fraction   is 55 - 60%. No regional wall motion abnormality noted. Moderate (grade 2)   left ventricular diastolic dysfunction. · No hemodynamically significant valvular pathology. · Severely elevated central venous pressure (15+ mmHg); IVC diameter is   larger than 21 mm and collapses less than 50% with respiration. · Large circumferential pericardial effusion.  Hemodynamic compromise   consistent with cardiac tamponade.          Signed by: Giorgio Mejia MD         ASSESSMENT:  Hospital Problems  Date Reviewed: 1/22/2020          Codes Class Noted POA    CAD S/P percutaneous coronary angioplasty (Chronic) ICD-10-CM: I25.10, Z98.61  ICD-9-CM: 414.01, V45.82  1/22/2020 Yes    Overview Signed 1/22/2020 12:28 AM by Shady Mckinney Kim GUAN DO     Stent 2004             * (Principal) Pericardial effusion with cardiac tamponade ICD-10-CM: I31.3, I31.4  ICD-9-CM: 423.9, 423.3  1/20/2020 Yes        Cardiac tamponade ICD-10-CM: I31.4  ICD-9-CM: 423.3  1/20/2020 Yes        Pleural effusion, left ICD-10-CM: J90  ICD-9-CM: 511.9  1/20/2020 Yes        Obstructive sleep apnea (Chronic) ICD-10-CM: G47.33  ICD-9-CM: 327.23  1/20/2020 Yes        Paroxysmal atrial fibrillation (HCC) (Chronic) ICD-10-CM: I48.0  ICD-9-CM: 427.31  1/8/2020 Yes                SUBJECTIVE:  No CP  Still has some  SOB    OBJECTIVE:    VS:   Visit Vitals  /70   Pulse 76   Temp 98.2 °F (36.8 °C)   Resp 14   Wt 115.3 kg (254 lb 3.2 oz)   SpO2 94%   BMI 35.45 kg/m²         Intake/Output Summary (Last 24 hours) at 1/22/2020 1518  Last data filed at 1/22/2020 0900  Gross per 24 hour   Intake 720 ml   Output 863 ml   Net -143 ml     TELE: normal sinus rhythm    General: alert, well developed, pleasant and in no apparent distress  HENT: Normocephalic, atraumatic. Normal external eye. Neck :  JVD difficult to assess due to obesity  Cardiac:  regular rate and rhythm, S1, S2 normal, no murmur, click, rub or gallop  Lungs: diminished breath sounds b/l.    Abdomen: Soft, nontender, no masses  Extremities:  No c/c/e, peripheral pulses present      Labs: Results:       Chemistry Recent Labs     01/22/20  0600 01/21/20  0445 01/20/20  1210   * 118* 131*    137 139   K 4.3 4.7 4.0    107 107   CO2 27 24 30   BUN 11 13 12   CREA 0.72 0.78 0.80   CA 8.6 7.9* 8.4*   AGAP 4 6 2*   BUCR 15 17 15   AP  --   --  155*   TP  --   --  7.1   ALB  --   --  3.1*   GLOB  --   --  4.0   AGRAT  --   --  0.8      CBC w/Diff Recent Labs     01/22/20  0600 01/21/20  0445 01/20/20  1210   WBC 11.1 11.1 5.9   RBC 4.10* 3.83* 3.75*   HGB 13.8 12.7* 12.6*   HCT 40.2 37.6 36.3    162 112*   GRANS 75*  --  72   LYMPH 10*  --  12*   EOS 3  --  2      Cardiac Enzymes Recent Labs 01/20/20  1210   CPK 81   CKND1 1.6      Coagulation Recent Labs     01/20/20  1210   PTP 14.1   INR 1.1   APTT 32.8       Lipid Panel No results found for: CHOL, CHOLPOCT, CHOLX, CHLST, CHOLV, 504183, HDL, HDLP, LDL, LDLC, DLDLP, 453105, VLDLC, VLDL, TGLX, TRIGL, TRIGP, TGLPOCT, CHHD, CHHDX   BNP No results for input(s): BNPP in the last 72 hours.    Liver Enzymes Recent Labs     01/20/20  1210   TP 7.1   ALB 3.1*   *   SGOT 55*      Thyroid Studies No results found for: T4, T3U, TSH, TSHEXT, TSHEXT           Ирина DESAI Thais:[phone]

## 2020-01-22 NOTE — PROGRESS NOTES
1900-  Report received. Wife at bedside. 2103-  Axis 2 tab PO.  0000-  Pt resting. 0400-  Pt resting. 0700-  Bedside and Verbal shift change report given to Ankita/STEVE Lee (oncoming nurse) by STEVE Tovar (offgoing nurse).  Report included the following information SBAR, Kardex, Intake/Output, MAR, Recent Results and Cardiac Rhythm SR.

## 2020-01-22 NOTE — PROGRESS NOTES
[facility]  Progress Note    Patient: Geoff Luciano Age: 62 y.o. : 1955 MR#: [id number] SSN: [id number]  Date/Time: 2020    Subjective:     Patient in NAD, awake, alert    Assessment/Plan:     - Large Pericardial effusion with Tamponade  - PAFib- was on eliquis at home  - Dyslipidemia  - Obesity Body mass index is 35.45 kg/m².     PLAN  S/p pericardial window and drains  As per CT Surg  Hold off AC as per cardio  On statin  D/w patient and 2 sons    Case discussed with:  [x]Patient  [x]Family  []Nursing  []Case Management  DVT Prophylaxis:  []Lovenox  []Hep SQ  [x]SCDs  []Coumadin   []On Heparin gtt    Objective:   VS:   Visit Vitals  /69   Pulse 79   Temp 97.7 °F (36.5 °C)   Resp 20   Wt 115.3 kg (254 lb 3.2 oz)   SpO2 92%   BMI 35.45 kg/m²      Tmax/24hrs: Temp (24hrs), Av.2 °F (36.8 °C), Min:97.6 °F (36.4 °C), Max:99.1 °F (37.3 °C)    Input/Output:     Intake/Output Summary (Last 24 hours) at 2020  Last data filed at 2020 1800  Gross per 24 hour   Intake 920 ml   Output 1315 ml   Net -395 ml       General:  Awake, alert  Cardiovascular:  S1S2+, RRR  Pulmonary:  CTA b/l  GI:  Soft, BS+, NT, ND  Extremities:  No edema  + pericardial drains in place    Labs:    Recent Results (from the past 24 hour(s))   CBC W/O DIFF    Collection Time: 20  4:45 AM   Result Value Ref Range    WBC 11.1 4.6 - 13.2 K/uL    RBC 3.83 (L) 4.70 - 5.50 M/uL    HGB 12.7 (L) 13.0 - 16.0 g/dL    HCT 37.6 36.0 - 48.0 %    MCV 98.2 (H) 74.0 - 97.0 FL    MCH 33.2 24.0 - 34.0 PG    MCHC 33.8 31.0 - 37.0 g/dL    RDW 13.1 11.6 - 14.5 %    PLATELET 402 405 - 121 K/uL    MPV 10.4 9.2 - 53.3 FL   METABOLIC PANEL, BASIC    Collection Time: 20  4:45 AM   Result Value Ref Range    Sodium 137 136 - 145 mmol/L    Potassium 4.7 3.5 - 5.5 mmol/L    Chloride 107 100 - 111 mmol/L    CO2 24 21 - 32 mmol/L    Anion gap 6 3.0 - 18 mmol/L    Glucose 118 (H) 74 - 99 mg/dL BUN 13 7.0 - 18 MG/DL    Creatinine 0.78 0.6 - 1.3 MG/DL    BUN/Creatinine ratio 17 12 - 20      GFR est AA >60 >60 ml/min/1.73m2    GFR est non-AA >60 >60 ml/min/1.73m2    Calcium 7.9 (L) 8.5 - 10.1 MG/DL     Additional Data Reviewed:      Signed By: Israel Aschoff, MD     January 21, 2020

## 2020-01-22 NOTE — PROGRESS NOTES
Problem: Falls - Risk of  Goal: *Absence of Falls  Description  Document Hammondjeramy Sotelo Fall Risk and appropriate interventions in the flowsheet. Outcome: Progressing Towards Goal  Note: Fall Risk Interventions:  Mobility Interventions: Assess mobility with egress test, Communicate number of staff needed for ambulation/transfer, Patient to call before getting OOB         Medication Interventions: Evaluate medications/consider consulting pharmacy, Patient to call before getting OOB    Elimination Interventions: Call light in reach, Urinal in reach              Problem: Patient Education: Go to Patient Education Activity  Goal: Patient/Family Education  Outcome: Progressing Towards Goal     Problem: Pressure Injury - Risk of  Goal: *Prevention of pressure injury  Description  Document Jarod Scale and appropriate interventions in the flowsheet. Outcome: Progressing Towards Goal  Note: Pressure Injury Interventions:  Sensory Interventions: Assess changes in LOC, Check visual cues for pain, Keep linens dry and wrinkle-free, Minimize linen layers, Maintain/enhance activity level, Pressure redistribution bed/mattress (bed type), Turn and reposition approx.  every two hours (pillows and wedges if needed)    Moisture Interventions: Absorbent underpads, Minimize layers    Activity Interventions: Increase time out of bed, Pressure redistribution bed/mattress(bed type)    Mobility Interventions: Assess need for specialty bed, Pressure redistribution bed/mattress (bed type), Chair cushion, Float heels    Nutrition Interventions: Document food/fluid/supplement intake    Friction and Shear Interventions: Apply protective barrier, creams and emollients, Lift sheet, Minimize layers                Problem: Patient Education: Go to Patient Education Activity  Goal: Patient/Family Education  Outcome: Progressing Towards Goal

## 2020-01-22 NOTE — PROGRESS NOTES
[facility]          Chest and pericardial tubes removed without difficulty. Pathology confirmed receipt and orders for pericardial fluid and pericardium. Patient OK for discharge from our perspective. Will need CXR and follow up appointment one week after discharge. Left our contact info with patient.

## 2020-01-22 NOTE — PROGRESS NOTES
PHYSICAL THERAPY EVALUATION AND DISCHARGE    Patient: Ryan Shane (24 y.o. male)  Date: 1/22/2020  Primary Diagnosis: Cardiac tamponade [I31.4]  Procedure(s) (LRB):  PERICARDIAL WINDOW AND DRAINAGE, LEFT CHEST TUBE INSERTION (N/A) 2 Days Post-Op   Precautions: falls     PLOF: Pt reports he was independent with ADLs and functional mobility. He lives with spouse in single story home. ASSESSMENT :  Patient is a 62 y.o. male s/p pericardial window and pericardial drains/chest tube. RN cleared pt to participate in therapy evaluation. Pt expresses he walked with spouse/RN this morning. He is able to perform sit to stand transfers and ambulate with supervision. Pt has good standing balance, rollator was used for management of drains/chest tube. Educated pt on role of PT and activity pacing. Patient does not require further skilled intervention at this level of care, therefore will be discharged from PT caseload. He is safe from mobility standpoint for discharge to home when medically cleared. PLAN :  Recommendations and Planned Interventions:   No formal PT needs identified at this time. Discharge Recommendations: None  Further Equipment Recommendations for Discharge: N/A     SUBJECTIVE:   Patient stated I walked once this morning .     OBJECTIVE DATA SUMMARY:     Past Medical History:   Diagnosis Date    Atrial fibrillation ([facility] [address] 01/07/2020    Coronary artery disease     Coronary atherosclerosis of native coronary artery     Stable, no recent angina    ED (erectile dysfunction)     Elevated blood pressure reading without diagnosis of hypertension     Monitor     History of hepatitis C     treated with interferon    Obstructive sleep apnea 1/20/2020    MELANI on CPAP     Other and unspecified hyperlipidemia     Paroxysmal atrial fibrillation (HCC) 1/8/2020    Platelet inhibition due to Plavix     on Eliquis    Postsurgical percutaneous transluminal coronary angioplasty status     Eight years since stent, no recent stent testing, EKG normal     Past Surgical History:   Procedure Laterality Date    HX CORONARY STENT PLACEMENT  2004     Barriers to Learning/Limitations: None  Compensate with: N/A  Home Situation:   Home Situation  Home Environment: Private residence  One/Two Story Residence: One story  Living Alone: No  Support Systems: Spouse/Significant Other/Partner  Patient Expects to be Discharged to[de-identified] Private residence  Current DME Used/Available at Home: None  Critical Behavior:  Neurologic State: Alert  Orientation Level: Oriented X4  Cognition: Appropriate decision making; Follows commands  Safety/Judgement: Fall prevention  Psychosocial  Patient Behaviors: Calm; Cooperative        Strength:    Strength: Within functional limits       Tone & Sensation:   Tone: Normal  Sensation: Intact        Range Of Motion:  AROM: Within functional limits    Functional Mobility:    Transfers:  Sit to Stand: Supervision  Stand to Sit: Supervision    Balance:   Sitting: Intact  Standing: Intact    Ambulation/Gait Training:  Distance (ft): 100 Feet (ft)  Assistive Device: Walker, rollator(for drain/line management)  Ambulation - Level of Assistance: Supervision    Pain:  Pt reports some discomfort along pericardial drain/chest tubes. Activity Tolerance:   Good. Please refer to the flowsheet for vital signs taken during this treatment. After treatment:   [x]         Patient left in no apparent distress sitting up in chair  []         Patient left in no apparent distress in bed  [x]         Call bell left within reach  [x]         Nursing notified  []         Caregiver present  []         Bed alarm activated  []         SCDs applied    COMMUNICATION/EDUCATION:   [x]         Role of Physical Therapy in the acute care setting. [x]         Fall prevention education was provided and the patient/caregiver indicated understanding. [x]         Patient/family have participated as able in goal setting and plan of care.   [] Patient/family agree to work toward stated goals and plan of care. []         Patient understands intent and goals of therapy, but is neutral about his/her participation. []         Patient is unable to participate in goal setting/plan of care: ongoing with therapy staff.  []         Other:     Thank you for this referral.  Arsenio Davis, PT   Time Calculation: 25 mins      Eval Complexity: History: MEDIUM  Complexity : 1-2 comorbidities / personal factors will impact the outcome/ POC Exam:LOW Complexity : 1-2 Standardized tests and measures addressing body structure, function, activity limitation and / or participation in recreation  Presentation: LOW Complexity : Stable, uncomplicated  Clinical Decision Making:Low Complexity    Overall Complexity:LOW

## 2020-01-22 NOTE — PROGRESS NOTES
Problem: Self Care Deficits Care Plan (Adult)  Goal: *Acute Goals and Plan of Care (Insert Text)  Outcome: Resolved/Met   OCCUPATIONAL THERAPY EVALUATION/DISCHARGE    Patient: Christos Lua (35 y.o. male)  Date: 1/22/2020  Primary Diagnosis: Cardiac tamponade [I31.4]  Procedure(s) (LRB):  PERICARDIAL WINDOW AND DRAINAGE, LEFT CHEST TUBE INSERTION (N/A) 2 Days Post-Op   Precautions: Falls; Aspiration    PLOF: Pt reports he lives with his wife in a [facility] with no ESMER. Pt was (I) with basic self-care/ADLs, IADLs, and functional mobility PTA. Pt has a shower chair, grab bars, and raised toilet seat at home. ASSESSMENT AND RECOMMENDATIONS:  Pt cleared to participate in OT evaluation by Rn. Upon entering room, pt seated in recliner, alert, and agreeable to therapy session. Based on the objective data described below, the patient presents he is able to perform most basic self-care/ADLs Mod(I)/(I). Pt was limited to complete LB ADLs at this d/t pericardial drain and chest tube. Educated pt on AE (reacher to don LB clothing, sock aid to don socks) that could be beneficial for pt to utilize during LB dressing, however pt declined at this time. Pt denied to use the bathroom, but was agreeable to ambulating down hallway ~4 mins to simulate toilet transfers and IADLs with supervision. Educated pt on the role of Ot,  evaluation process, and self-pacing with pt demo good understanding. Pt has a supportive wife to assist PRN. Skilled acute occupational therapy is not indicated at this time. Discharge Recommendations: Home health safety evaluation  Further Equipment Recommendations for Discharge: N/A; Pt has all the recommended equipment to safely return home.      SUBJECTIVE:   Patient stated I live with my wife    OBJECTIVE DATA SUMMARY:     Past Medical History:   Diagnosis Date    Atrial fibrillation ([facility] [address] 01/07/2020    Coronary artery disease     Coronary atherosclerosis of native coronary artery     Stable, no recent angina    ED (erectile dysfunction)     Elevated blood pressure reading without diagnosis of hypertension     Monitor     History of hepatitis C     treated with interferon    Obstructive sleep apnea 1/20/2020    MELANI on CPAP     Other and unspecified hyperlipidemia     Paroxysmal atrial fibrillation (HCC) 1/8/2020    Platelet inhibition due to Plavix     on Eliquis    Postsurgical percutaneous transluminal coronary angioplasty status     Eight years since stent, no recent stent testing, EKG normal     Past Surgical History:   Procedure Laterality Date    HX CORONARY STENT PLACEMENT  2004     Barriers to Learning/Limitations: None  Compensate with: visual, verbal, tactile, kinesthetic cues/model    Home Situation:   Home Situation  Home Environment: Private residence  One/Two Story Residence: One story  Living Alone: No  Support Systems: Spouse/Significant Other/Partner  Patient Expects to be Discharged to[de-identified] Private residence  Current DME Used/Available at Home: None  [x]     Right hand dominant   []     Left hand dominant    Cognitive/Behavioral Status:  Neurologic State: Alert  Orientation Level: Oriented X4  Cognition: Appropriate decision making; Follows commands  Safety/Judgement: Fall prevention    Skin: Visible skin appeared intact  Edema: None noted    Coordination: BUE  Coordination: Within functional limits  Fine Motor Skills-Upper: Left Intact; Right Intact    Gross Motor Skills-Upper: Left Intact; Right Intact    Balance:  Sitting: Intact  Standing: Intact    Strength: BUE  Strength: Within functional limits    Tone & Sensation: BUE  Tone: Normal  Sensation: Intact    Range of Motion: BUE  AROM: Within functional limits      Functional Mobility and Transfers for ADLs:  Bed Mobility:  Pt sitting in recliner upon arrival  Transfers:  Sit to Stand: Supervision  Stand to Sit: Supervision   Toilet Transfer : Supervision(simulated);  Pt denied to use bathroom but was agreeable to ambulating down hallway ~4 mins in prep for toilet transfers and IADLs. ADL Assessment:  Feeding: Independent    Oral Facial Hygiene/Grooming: Independent    Bathing: Supervision    Upper Body Dressing: Modified independent    Lower Body Dressing: Supervision     Toileting: Supervision      ADL Intervention:  UB dressing: Mod(I) [facility] gown over back. Pt limited to perform LB ADLs d/t being attached to pericardial drain and chest tube. Pt was educated on use a of a reacher and sock aid to assist with LB dressing, however pt reports he would be able to complete task without chest tube attached, denying the need for AE at this time. Cognitive Retraining  Safety/Judgement: Fall prevention    Pain:  Pain level pre-treatment: 0/10   Pain level post-treatment: 0/10   Pain Intervention(s): Medication (see MAR); Rest, Ice, Repositioning  Response to intervention: Nurse notified, See doc flow    Activity Tolerance:   Good    Please refer to the flowsheet for vital signs taken during this treatment. After treatment:   [x]  Patient left in no apparent distress sitting up in chair  []  Patient left in no apparent distress in bed  [x]  Call bell left within reach  [x]  Nursing notified  []  Caregiver present  []  Bed alarm activated    COMMUNICATION/EDUCATION:   [x]      Role of Occupational Therapy in the acute care setting  [x]      Home safety education was provided and the patient/caregiver indicated understanding. [x]      Patient/family have participated as able and agree with findings and recommendations. []      Patient is unable to participate in plan of care at this time. Thank you for this referral.  Nydia Hernandez MS, OTR/L  Time Calculation: 25 mins      Eval Complexity: History: MEDIUM Complexity : Expanded review of history including physical, cognitive and psychosocial  history ;    Examination: LOW Complexity : 1-3 performance deficits relating to physical, cognitive , or psychosocial skils that result in activity limitations and / or participation restrictions ;    Decision Making:LOW Complexity : No comorbidities that affect functional and no verbal or physical assistance needed to complete eval tasks

## 2020-01-22 NOTE — PROGRESS NOTES
Postop day 2 emergent subxiphoid pericardial window, placement of left pleural chest tube x1. Stable vital signs normal sinus rhythm. No further complaint. Minimal drainage from both pericardial and pleural chest tube with no evidence of air leak. Chest x-ray with clear costophrenic angles bilaterally. Assessment and plan    To DC all chest tubes  Cytology and pathology results are still pending  Telemetry indicated normal sinus rhythm, patient probably may not need to be put back on NOAC, in view of the bloody serosanguineous pericardial fluid.   Patient may be DC home today, per primary team

## 2020-01-23 ENCOUNTER — TELEPHONE (OUTPATIENT)
Dept: CARDIOTHORACIC SURGERY | Age: 65
End: 2020-01-23

## 2020-01-23 ENCOUNTER — APPOINTMENT (OUTPATIENT)
Dept: NON INVASIVE DIAGNOSTICS | Age: 65
DRG: 271 | End: 2020-01-23
Attending: INTERNAL MEDICINE
Payer: COMMERCIAL

## 2020-01-23 ENCOUNTER — APPOINTMENT (OUTPATIENT)
Dept: GENERAL RADIOLOGY | Age: 65
DRG: 271 | End: 2020-01-23
Attending: HOSPITALIST
Payer: COMMERCIAL

## 2020-01-23 VITALS
WEIGHT: 254 LBS | TEMPERATURE: 98.6 F | HEART RATE: 64 BPM | BODY MASS INDEX: 35.56 KG/M2 | DIASTOLIC BLOOD PRESSURE: 76 MMHG | RESPIRATION RATE: 16 BRPM | OXYGEN SATURATION: 97 % | HEIGHT: 71 IN | SYSTOLIC BLOOD PRESSURE: 108 MMHG

## 2020-01-23 LAB
ECHO LV EDV TEICHHOLZ: 54.08 ML
ECHO LV ESV TEICHHOLZ: 23.42 ML
ECHO LV INTERNAL DIMENSION DIASTOLIC: 5.21 CM (ref 4.2–5.9)
ECHO LV INTERNAL DIMENSION SYSTOLIC: 3.65 CM
ECHO LV IVSD: 1.15 CM (ref 0.6–1)
ECHO LV MASS 2D: 346.9 G (ref 88–224)
ECHO LV MASS INDEX 2D: 148.6 G/M2 (ref 49–115)
ECHO LV POSTERIOR WALL DIASTOLIC: 1.51 CM (ref 0.6–1)
LVFS 2D: 29.91 %
LVSV (TEICH): 30.66 ML

## 2020-01-23 PROCEDURE — 71045 X-RAY EXAM CHEST 1 VIEW: CPT

## 2020-01-23 PROCEDURE — 74011250636 HC RX REV CODE- 250/636: Performed by: HOSPITALIST

## 2020-01-23 PROCEDURE — 74011250637 HC RX REV CODE- 250/637: Performed by: NURSE PRACTITIONER

## 2020-01-23 PROCEDURE — 90686 IIV4 VACC NO PRSV 0.5 ML IM: CPT | Performed by: EMERGENCY MEDICINE

## 2020-01-23 PROCEDURE — 74011250636 HC RX REV CODE- 250/636: Performed by: EMERGENCY MEDICINE

## 2020-01-23 PROCEDURE — 74011250637 HC RX REV CODE- 250/637: Performed by: HOSPITALIST

## 2020-01-23 PROCEDURE — 90471 IMMUNIZATION ADMIN: CPT

## 2020-01-23 PROCEDURE — 93308 TTE F-UP OR LMTD: CPT

## 2020-01-23 PROCEDURE — C9113 INJ PANTOPRAZOLE SODIUM, VIA: HCPCS | Performed by: HOSPITALIST

## 2020-01-23 RX ORDER — HYDROCODONE BITARTRATE AND ACETAMINOPHEN 5; 325 MG/1; MG/1
1 TABLET ORAL
Qty: 5 TAB | Refills: 0 | Status: SHIPPED | OUTPATIENT
Start: 2020-01-23 | End: 2020-01-26

## 2020-01-23 RX ORDER — DOCUSATE SODIUM 100 MG/1
100 CAPSULE, LIQUID FILLED ORAL
Qty: 60 CAP | Refills: 0 | Status: SHIPPED | OUTPATIENT
Start: 2020-01-23 | End: 2020-01-30

## 2020-01-23 RX ORDER — GUAIFENESIN 100 MG/5ML
81 LIQUID (ML) ORAL DAILY
Qty: 30 TAB | Refills: 0 | Status: SHIPPED | OUTPATIENT
Start: 2020-01-24 | End: 2022-05-12

## 2020-01-23 RX ADMIN — HEPARIN SODIUM 5000 UNITS: 5000 INJECTION INTRAVENOUS; SUBCUTANEOUS at 05:30

## 2020-01-23 RX ADMIN — Medication 10 ML: at 14:00

## 2020-01-23 RX ADMIN — ASPIRIN 81 MG 81 MG: 81 TABLET ORAL at 09:32

## 2020-01-23 RX ADMIN — Medication 10 ML: at 06:00

## 2020-01-23 RX ADMIN — METOPROLOL TARTRATE 50 MG: 50 TABLET, FILM COATED ORAL at 09:32

## 2020-01-23 RX ADMIN — INFLUENZA VIRUS VACCINE 0.5 ML: 15; 15; 15; 15 SUSPENSION INTRAMUSCULAR at 14:43

## 2020-01-23 RX ADMIN — SODIUM CHLORIDE 40 MG: 9 INJECTION INTRAMUSCULAR; INTRAVENOUS; SUBCUTANEOUS at 09:32

## 2020-01-23 NOTE — DISCHARGE INSTRUCTIONS
Patient Education       Patient Education        Learning About Coronary Artery Disease (CAD)  What is coronary artery disease? Coronary artery disease (CAD) occurs when plaque builds up in the arteries that bring oxygen-rich blood to your heart. Plaque is a fatty substance made of cholesterol, calcium, and other substances in the blood. This process is called hardening of the arteries, or atherosclerosis. What happens when you have coronary artery disease? · Plaque may narrow the coronary arteries. Narrowed arteries cause poor blood flow. This can lead to angina symptoms such as chest pain or discomfort. If blood flow is completely blocked, you could have a heart attack. · You can slow CAD and reduce the risk of future problems by making changes in your lifestyle. These include quitting smoking and eating heart-healthy foods. · Treatments for CAD, along with changes in your lifestyle, can help you live a longer and healthier life. How can you prevent coronary artery disease? · Do not smoke. It may be the best thing you can do to prevent heart disease. If you need help quitting, talk to your doctor about stop-smoking programs and medicines. These can increase your chances of quitting for good. · Be active. Get at least 30 minutes of exercise on most days of the week. Walking is a good choice. You also may want to do other activities, such as running, swimming, cycling, or playing tennis or team sports. · Eat heart-healthy foods. Eat more fruits and vegetables and less foods that contain saturated and trans fats. Limit alcohol, sodium, and sweets. · Stay at a healthy weight. Lose weight if you need to. · Manage other health problems such as diabetes, high blood pressure, and high cholesterol. How is coronary artery disease treated? · Your doctor will suggest that you make lifestyle changes.  For example, your doctor may ask you to eat healthy foods, quit smoking, lose extra weight, and be more active. · You will have to take medicines. · Your doctor may suggest a procedure to open narrowed or blocked arteries. This is called angioplasty. Or your doctor may suggest using healthy blood vessels to create detours around narrowed or blocked arteries. This is called bypass surgery. Follow-up care is a key part of your treatment and safety. Be sure to make and go to all appointments, and call your doctor if you are having problems. It's also a good idea to know your test results and keep a list of the medicines you take. Where can you learn more? Go to http://pabloSt. Teresa Medicalcyndi.info/. Enter [phone] in the search box to learn more about \"Learning About Coronary Artery Disease (CAD). \"  Current as of: April 9, 2019  Content Version: 12.2  © 6478-0489 [employer]. Care instructions adapted under license by [employer] (which disclaims liability or warranty for this information). If you have questions about a medical condition or this instruction, always ask your healthcare professional. [place] [address] any warranty or liability for your use of this information. Patient Education        Chest Tube Removal: What to Expect at [address] [facility]    A chest tube is placed through the chest wall between two ribs. You may have had a chest tube put in to help your collapsed lung expand. Or the tube may have helped drain fluid from a chest infection or surgery. The tube was removed before you came home. You may have some pain in your chest from the cut (incision) where the tube was put in. For most people, the pain goes away after about 2 weeks. You will have a bandage taped over the wound. Your doctor will remove the bandage and examine the wound in about 2 days. It will take about 3 to 4 weeks for your incision to heal completely. It may leave a small scar that will fade with time.   This care sheet gives you a general idea about how long it will take for you to recover. But each person recovers at a different pace. Follow the steps below to feel better as quickly as possible. How can you care for yourself at home? Activity    · Rest when you feel tired. Getting enough sleep will help you recover.     · Try to walk each day. Start by walking a little more than you did the day before. Bit by bit, increase the amount you walk. Walking boosts blood flow and helps prevent pneumonia and constipation.     · Avoid strenuous activities, such as bicycle riding, jogging, weight lifting, or aerobic exercise, until your doctor says it is okay.     · How soon you can return to work or your normal routine depends on what health problems you have. Talk with your doctor about how long it will take you to recover.     · You may shower after your bandage is removed. Pat the cut (incision) dry. Do not take a bath for 2 weeks after your chest tube is out, or until your doctor tells you it is okay.     · Practice deep breathing exercises as directed by your doctor. Coughing exercises also can help drain fluid out of your chest.   Diet    · You can eat your normal diet. If your stomach is upset, try bland, low-fat foods like plain rice, broiled chicken, toast, and yogurt.     · Drink plenty of fluids (unless your doctor tells you not to). Medicines    · Your doctor will tell you if and when you can restart your medicines. He or she will also give you instructions about taking any new medicines.     · If you take aspirin or some other blood thinner, be sure to talk to your doctor. He or she will tell you if and when to start taking this medicine again. Make sure that you understand exactly what your doctor wants you to do.     · Be safe with medicines. Take pain medicines exactly as directed. ? If the doctor gave you a prescription medicine for pain, take it as prescribed.   ? If you are not taking a prescription pain medicine, ask your doctor if you can take an over-the-counter medicine.     · Take your antibiotics as directed. Do not stop taking them just because you feel better. You need to take the full course of antibiotics. Incision care    · Keep the incision dry as it heals. You will have a bandage over it to help the incision heal and protect it. Your doctor will tell you how to take care of this. Other instructions    · Do not smoke. Smoking makes lung problems worse. If you need help quitting, talk to your doctor about stop-smoking programs and medicines. These can increase your chances of quitting for good. Follow-up care is a key part of your treatment and safety. Be sure to make and go to all appointments, and call your doctor if you are having problems. It's also a good idea to know your test results and keep a list of the medicines you take. When should you call for help? Call 911 anytime you think you may need emergency care. For example, call if:    · You passed out (lost consciousness).     · You have severe trouble breathing.     · You have sudden chest pain and shortness of breath, or you cough up blood.    Call your doctor now or seek immediate medical care if:    · You have trouble breathing.     · Your shortness of breath is getting worse.     · Bright red blood soaks through the bandage over your incision.     · You have a fever.    Watch closely for any changes in your health, and be sure to contact your doctor if:    · You do not get better as expected. Where can you learn more? Go to http://pablo-cyndi.info/. Enter I068 in the search box to learn more about \"Chest Tube Removal: What to Expect at Home. \"  Current as of: June 9, 2019  Content Version: 12.2  © 1049-4688 [employer]. Care instructions adapted under license by [employer] (which disclaims liability or warranty for this information).  If you have questions about a medical condition or this instruction, always ask your healthcare professional. Healthwise, [facility] disclaims any warranty or liability for your use of this information. Learning About Surgery for Pericardial Effusion  What is surgery for pericardial drainage? Surgery for pericardial effusion is a procedure to relieve pressure on your heart. The tissue sac that surrounds the heart is called the pericardium. Normally, there is a small amount of fluid in it. This fluid surrounds and helps cushion your heart. Sometimes too much fluid builds up in the sac. This is called pericardial effusion. Extra fluid can be caused by many things, including pericarditis (inflammation of the sac), heart attack, surgery, kidney failure, infection, some cancers, and certain diseases such as lupus. Sometimes the cause is not known. Pericardial drainage is sometimes done in an emergency. If a lot of fluid builds up too fast, it can cause increased pressure on your heart. This pressure is called cardiac tamponade. It can reduce the heart's ability to pump blood. How is the surgery done? Surgery may be done in different ways. Pericardial window is one type of surgery. Your doctor will tell you more about your surgery and what to expect. You will get anesthesia so that during surgery you will be asleep and not feel pain. Your doctor will make a cut (incision) in your chest. It may be below your breastbone or between two ribs. Your doctor may need to make more than one cut. Then your doctor will make a cut in the sac that surrounds your heart. A tube (catheter) may be used to drain fluid from the sac. Some fluid or a small piece of the sac may be saved and sent to a lab for tests. When the surgery is finished, your doctor will close the cuts with stitches or staples. What can you expect after surgery? You will stay in the hospital for a few days after surgery. Your recovery will depend on what caused the buildup of fluid around your heart and what type of surgery you have.   When you wake up from surgery, you will be sore. You will get medicine to help with the pain. Your chest may be swollen and painful. It may ache or feel stiff for several weeks. It is common to feel tired for several days or weeks after surgery. Your doctor will give you medicine to help with pain. The amount of time you will need to recover at home depends on the type of surgery you had. If you work, you will probably need to take at least 1 week off. Follow-up care is a key part of your treatment and safety. Be sure to make and go to all appointments, and call your doctor if you are having problems. It's also a good idea to know your test results and keep a list of the medicines you take. Where can you learn more? Go to http://pablo-cyndi.info/. Enter K262 in the search box to learn more about \"Learning About Surgery for Pericardial Effusion. \"  Current as of: April 9, 2019  Content Version: 12.2  © 4796-7213 "[employer]. Care instructions adapted under license by [employer] (which disclaims liability or warranty for this information). If you have questions about a medical condition or this instruction, always ask your healthcare professional. [place] [address] any warranty or liability for your use of this information. DISCHARGE SUMMARY from Nurse    These are general instructions for a healthy lifestyle:    No smoking/ No tobacco products/ Avoid exposure to second hand smoke  Surgeon General's Warning:  Quitting smoking now greatly reduces serious risk to your health.     Obesity, smoking, and sedentary lifestyle greatly increases your risk for illness    A healthy diet, regular physical exercise & weight monitoring are important for maintaining a healthy lifestyle    You may be retaining fluid if you have a history of heart failure or if you experience any of the following symptoms:  Weight gain of 3 pounds or more overnight or 5 pounds in a week, increased swelling in our hands or feet or shortness of breath while lying flat in bed. Please call your doctor as soon as you notice any of these symptoms; do not wait until your next office visit. Recognize signs and symptoms of STROKE:    F-face looks uneven    A-arms unable to move or move unevenly    S-speech slurred or non-existent    T-time-call 911 as soon as signs and symptoms begin-DO NOT go       Back to bed or wait to see if you get better-TIME IS BRAIN. Warning Signs of HEART ATTACK     Call 911 if you have these symptoms:   Chest discomfort. Most heart attacks involve discomfort in the center of the chest that lasts more than a few minutes, or that goes away and comes back. It can feel like uncomfortable pressure, squeezing, fullness, or pain.  Discomfort in other areas of the upper body. Symptoms can include pain or discomfort in one or both arms, the back, neck, jaw, or stomach.  Shortness of breath with or without chest discomfort.  Other signs may include breaking out in a cold sweat, nausea, or lightheadedness. Don't wait more than five minutes to call 911 - MINUTES MATTER! Fast action can save your life. Calling 911 is almost always the fastest way to get lifesaving treatment. Emergency Medical Services staff can begin treatment when they arrive -- up to an hour sooner than if someone gets to the hospital by car.

## 2020-01-23 NOTE — PROGRESS NOTES
Problem: Falls - Risk of  Goal: *Absence of Falls  Description  Document Edgar Brunner Fall Risk and appropriate interventions in the flowsheet. Outcome: Progressing Towards Goal  Note: Fall Risk Interventions:  Mobility Interventions: Assess mobility with egress test, PT Consult for mobility concerns, Communicate number of staff needed for ambulation/transfer         Medication Interventions: Patient to call before getting OOB, Teach patient to arise slowly    Elimination Interventions: Call light in reach, Urinal in reach              Problem: Pressure Injury - Risk of  Goal: *Prevention of pressure injury  Description  Document Jarod Scale and appropriate interventions in the flowsheet.   Outcome: Resolved/Not Met  Note: Pressure Injury Interventions:  Sensory Interventions: Assess changes in LOC, Chair cushion, Check visual cues for pain, Minimize linen layers, Maintain/enhance activity level, Keep linens dry and wrinkle-free, Float heels, Pressure redistribution bed/mattress (bed type)    Moisture Interventions: Absorbent underpads    Activity Interventions: Increase time out of bed, Chair cushion, Assess need for specialty bed, Pressure redistribution bed/mattress(bed type), PT/OT evaluation    Mobility Interventions: Assess need for specialty bed, Float heels, Pressure redistribution bed/mattress (bed type), PT/OT evaluation    Nutrition Interventions: Document food/fluid/supplement intake, Offer support with meals,snacks and hydration    Friction and Shear Interventions: Lift sheet, Minimize layers                Problem: Patient Education: Go to Patient Education Activity  Goal: Patient/Family Education  Outcome: Resolved/Not Met

## 2020-01-23 NOTE — PROGRESS NOTES
[facility] Hospitalist Group  Progress Note    Patient: Funmi Sal Age: 62 y.o. : 1955 MR#: [id number] SSN: [id number]  Date/Time: 2020    Subjective:     Patient lying in bed in NAD, awake, alert, feels better    Assessment/Plan:     - Large Pericardial effusion with Tamponade  - PAFib- was on eliquis at home  - Dyslipidemia  - Obesity Body mass index is 35.45 kg/m².     PLAN  S/p pericardial window , drains removed by CT surgery  Hold off AC as per cardio  On statin  D/w Dr Gold [place] and he plans to repeat echo in am  D/w patient  Full code  Dispo- home soon if ok with cardio    Case discussed with:  [x]Patient  [x]Family  []Nursing  []Case Management  DVT Prophylaxis:  []Lovenox  []Hep SQ  [x]SCDs  []Coumadin   []On Heparin gtt    Objective:   VS:   Visit Vitals  /71 (BP 1 Location: Right arm, BP Patient Position: At rest)   Pulse 79   Temp 97.7 °F (36.5 °C)   Resp 18   Wt 115.3 kg (254 lb 3.2 oz)   SpO2 96%   BMI 35.45 kg/m²      Tmax/24hrs: Temp (24hrs), Av °F (36.7 °C), Min:97.7 °F (36.5 °C), Max:98.2 °F (36.8 °C)    Input/Output:     Intake/Output Summary (Last 24 hours) at 2020 1917  Last data filed at 2020 0900  Gross per 24 hour   Intake 720 ml   Output 663 ml   Net 57 ml       General:  Awake, alert  Cardiovascular:  S1S2+, RRR  Pulmonary:  CTA b/l  GI:  Soft, BS+, NT, ND  Extremities:  No edema      Labs:    Recent Results (from the past 24 hour(s))   CBC WITH AUTOMATED DIFF    Collection Time: 20  6:00 AM   Result Value Ref Range    WBC 11.1 4.6 - 13.2 K/uL    RBC 4.10 (L) 4.70 - 5.50 M/uL    HGB 13.8 13.0 - 16.0 g/dL    HCT 40.2 36.0 - 48.0 %    MCV 98.0 (H) 74.0 - 97.0 FL    MCH 33.7 24.0 - 34.0 PG    MCHC 34.3 31.0 - 37.0 g/dL    RDW 13.2 11.6 - 14.5 %    PLATELET 609 750 - 879 K/uL    MPV 10.1 9.2 - 11.8 FL    NEUTROPHILS 75 (H) 40 - 73 %    LYMPHOCYTES 10 (L) 21 - 52 %    MONOCYTES 12 (H) 3 - 10 %    EOSINOPHILS 3 0 - 5 %    BASOPHILS 0 0 - 2 %    ABS. NEUTROPHILS 8.3 (H) 1.8 - 8.0 K/UL    ABS. LYMPHOCYTES 1.1 0.9 - 3.6 K/UL    ABS. MONOCYTES 1.4 (H) 0.05 - 1.2 K/UL    ABS. EOSINOPHILS 0.3 0.0 - 0.4 K/UL    ABS.  BASOPHILS 0.0 0.0 - 0.1 K/UL    DF AUTOMATED     METABOLIC PANEL, BASIC    Collection Time: 01/22/20  6:00 AM   Result Value Ref Range    Sodium 137 136 - 145 mmol/L    Potassium 4.3 3.5 - 5.5 mmol/L    Chloride 106 100 - 111 mmol/L    CO2 27 21 - 32 mmol/L    Anion gap 4 3.0 - 18 mmol/L    Glucose 109 (H) 74 - 99 mg/dL    BUN 11 7.0 - 18 MG/DL    Creatinine 0.72 0.6 - 1.3 MG/DL    BUN/Creatinine ratio 15 12 - 20      GFR est AA >60 >60 ml/min/1.73m2    GFR est non-AA >60 >60 ml/min/1.73m2    Calcium 8.6 8.5 - 10.1 MG/DL   MAGNESIUM    Collection Time: 01/22/20  6:00 AM   Result Value Ref Range    Magnesium 2.1 1.6 - 2.6 mg/dL     Additional Data Reviewed:      Signed By: Meaghan Tejada MD     January 22, 2020

## 2020-01-23 NOTE — PROGRESS NOTES
1900: Bedside and Verbal shift change report given to STEVE Tovar (oncoming nurse) by Jason James RN (offgoing nurse).  Report included the following information SBAR, Kardex, Intake/Output, MAR, Recent Results and Cardiac Rhythm SR.

## 2020-01-23 NOTE — PROGRESS NOTES
0700: Bedside and verbal report received from Rush John RN. Pt sittig in recliner watching TV. Wife at bedside. Dressings to chest tube insertion sites looks clean dry and intact. Reinforced No complaints of pain or discomfort. VS stable. 3942: Dread at bedside performing echo. Pt has no complaints of pain or discomfort. 0900: Pt sitting up in bed post-echo eating breakfast. No complaints of pain or nausea. 1200: Pt sitting up in bed watching TV. Eating lunch. No complaints of pain. 1600: I have reviewed discharge instructions with the patient and spouse. The patient and spouse verbalized understanding. Patient armband removed and shredded.

## 2020-01-23 NOTE — PROGRESS NOTES
1900-  Report received. Family at bedside. Echo to be done in am.  2345-  [place] 2 tab PO.  0000-  Pt resting. 0400-  Pt resting. 0700-  Bedside and Verbal shift change report given to STEVE Sanchez (oncoming nurse) by Linden Koyanagi, RN (offgoing nurse).  Report included the following information SBAR, Kardex, OR Summary, Intake/Output, MAR, Recent Results and Cardiac Rhythm SR.

## 2020-01-23 NOTE — DISCHARGE SUMMARY
[address] [place] [address] Dr Fredy Lowery [facility], [address]     DISCHARGE SUMMARY    Name: Kimberlee Mayer MRN: [id number]   Age / Sex: 62 y.o. / male CSN: [id number]   YOB: 1955 Length of Stay: 3 days   Admit Date: 1/20/2020 Discharge Date:        PRIMARY CARE PHYSICIAN: Rin Wharton MD      DISCHARGE DIAGNOSES:  - Large Pericardial effusion with Tamponade  - PAFib- was on eliquis at home  - Dyslipidemia  - Obesity Body mass index is 35.45 kg/m². CONSULTS CALLED: cardiology, CT surgery      PROCEDURES DONE: pericardial window and drains      COURSE IN [facility] [place] [facility]: This is a 72-year-old male who was sent to the ED by his cardiology after he had an echocardiogram which was concerning for cardiac tamponade. Patient was evaluated and was admitted. CT surgery was consulted and patient underwent pericardial window and drain placement. Cardiology was also consulted. Patient symptoms showed improvement. Patient was mobilized. Cardiothoracic surgery removed the pericardial drains and cleared the patient for discharge. Echocardiogram was repeated on the day of discharge. I discussed with the cardiologist regarding the discharge plans and he cleared the patient for discharge with plans for close outpatient follow-up. Eliquis was held at the time of discharge with plans for reevaluation by cardiology as an outpatient to see when it could be safely resumed. Discharge plans were discussed with the patient and wife. Case management was involved. Patient was discharged home. MEDICATIONS ON DISCHARGE:    Current Discharge Medication List      START taking these medications    Details   aspirin 81 mg chewable tablet Take 1 Tab by mouth daily. Qty: 30 Tab, Refills: 0      HYDROcodone-acetaminophen (NORCO) 5-325 mg per tablet Take 1 Tab by mouth every eight (8) hours as needed for Pain for up to 3 days. Max Daily Amount: 3 Tabs.   Qty: 5 Tab, Refills: 0    Associated Diagnoses: S/P pericardial window creation      !! OTHER Check CBC, CMP, Mg in 4 days, results to PCP immediately, Diagnosis- Pericardial effusion  Qty: 1 Each, Refills: 0      !! OTHER Incentive Spirometry- Use as directed  Qty: 1 Each, Refills: 0      docusate sodium (COLACE) 100 mg capsule Take 1 Cap by mouth two (2) times daily as needed for Constipation. Qty: 60 Cap, Refills: 0       !! - Potential duplicate medications found. Please discuss with provider. CONTINUE these medications which have NOT CHANGED    Details   metoprolol tartrate (LOPRESSOR) 50 mg tablet Take 1 Tab by mouth two (2) times a day. Qty: 60 Tab, Refills: 3      rosuvastatin (CRESTOR) 20 mg tablet Take 1 Tab by mouth nightly. Qty: 30 Tab, Refills: 3      omega 3-dha-epa-fish oil 60- mg cap Take 500 mg by mouth. STOP taking these medications       furosemide (LASIX) 20 mg tablet Comments:   Reason for Stopping:         furosemide (LASIX) 20 mg tablet Comments:   Reason for Stopping:         apixaban (ELIQUIS) 5 mg tablet Comments:   Reason for Stopping:         aspirin delayed-release 325 mg tablet Comments:   Reason for Stopping:         sildenafil citrate (VIAGRA) 100 mg tablet Comments:   Reason for Stopping:         tadalafil (CIALIS) 20 mg tablet Comments:   Reason for Stopping:                 DISCHARGE VITAL SIGNS:  Visit Vitals  BP (P) 108/76   Pulse (P) 64   Temp (P) 98.6 °F (37 °C)   Resp (P) 16   Ht 5' 11\" (1.803 m)   Wt 115.2 kg (254 lb)   SpO2 (P) 97%   BMI 35.43 kg/m²       DISCHARGE PHYSICAL EXAMINATION:  General:  Awake, alert  Cardiovascular:  S1S2+, RRR  Pulmonary:  CTA b/l  GI:  Soft, BS+, NT, ND  Extremities:  No edema      CONDITION ON DISCHARGE: Stable.       DISPOSITION: home      FOLLOW-UP RECOMMENDATIONS:   Follow-up Information     Follow up With Specialties Details Why Maude Gonzalez MD [facility]   [address] [facility]  [phone]            OTHER INSTRUCTIONS:        TIME SPENT ON DISCHARGE ACTIVITIES: More than 35 minutes. Dragon medical dictation software was used for portions of this report. Unintended errors may occur.       Signed:  Cuba Pendleton MD      1/23/2020

## 2020-01-24 LAB
ABO + RH BLD: NORMAL
BLD PROD TYP BPU: NORMAL
BLOOD GROUP ANTIBODIES SERPL: NORMAL
BPU ID: NORMAL
CROSSMATCH RESULT,%XM: NORMAL
SPECIMEN EXP DATE BLD: NORMAL
STATUS OF UNIT,%ST: NORMAL
UNIT DIVISION, %UDIV: 0

## 2020-01-25 LAB
BACTERIA SPEC ANAEROBE CULT: NORMAL
BACTERIA SPEC CULT: NORMAL
GRAM STN SPEC: NORMAL
GRAM STN SPEC: NORMAL
SERVICE CMNT-IMP: NORMAL
SPECIMEN SOURCE: NORMAL

## 2020-01-26 LAB
BACTERIA SPEC CULT: NORMAL
BACTERIA SPEC CULT: NORMAL
SERVICE CMNT-IMP: NORMAL
SERVICE CMNT-IMP: NORMAL

## 2020-01-28 ENCOUNTER — OFFICE VISIT (OUTPATIENT)
Dept: CARDIOLOGY CLINIC | Age: 65
End: 2020-01-28

## 2020-01-28 VITALS
HEIGHT: 71 IN | BODY MASS INDEX: 35.56 KG/M2 | HEART RATE: 58 BPM | DIASTOLIC BLOOD PRESSURE: 71 MMHG | SYSTOLIC BLOOD PRESSURE: 128 MMHG | WEIGHT: 254 LBS

## 2020-01-28 DIAGNOSIS — Z98.61 CAD S/P PERCUTANEOUS CORONARY ANGIOPLASTY: Primary | ICD-10-CM

## 2020-01-28 DIAGNOSIS — Z98.61 POSTSURGICAL PERCUTANEOUS TRANSLUMINAL CORONARY ANGIOPLASTY STATUS: ICD-10-CM

## 2020-01-28 DIAGNOSIS — I48.0 PAROXYSMAL ATRIAL FIBRILLATION (HCC): ICD-10-CM

## 2020-01-28 DIAGNOSIS — I31.4 PERICARDIAL EFFUSION WITH CARDIAC TAMPONADE: ICD-10-CM

## 2020-01-28 DIAGNOSIS — I25.10 CAD S/P PERCUTANEOUS CORONARY ANGIOPLASTY: Primary | ICD-10-CM

## 2020-01-28 DIAGNOSIS — I31.39 PERICARDIAL EFFUSION WITH CARDIAC TAMPONADE: ICD-10-CM

## 2020-01-28 DIAGNOSIS — I30.9 ACUTE PERICARDITIS, UNSPECIFIED TYPE: ICD-10-CM

## 2020-01-28 DIAGNOSIS — G47.33 OSA (OBSTRUCTIVE SLEEP APNEA): ICD-10-CM

## 2020-01-28 RX ORDER — COLCHICINE 0.6 MG/1
0.6 TABLET ORAL DAILY
Qty: 90 TAB | Refills: 0 | Status: SHIPPED | OUTPATIENT
Start: 2020-01-28 | End: 2020-05-04

## 2020-01-28 RX ORDER — NAPROXEN 500 MG/1
500 TABLET ORAL 2 TIMES DAILY WITH MEALS
Qty: 180 TAB | Refills: 0 | Status: SHIPPED | OUTPATIENT
Start: 2020-01-28 | End: 2020-02-26 | Stop reason: ALTCHOICE

## 2020-01-28 NOTE — PROGRESS NOTES
1. Have you been to the ER, urgent care clinic since your last visit? Hospitalized since your last visit? Yes MV   2. Have you seen or consulted any other health care providers outside of the [address] [place] [address] since your last visit? Include any pap smears or colon screening. No     3. Since your last visit, have you had any of the following symptoms? chest pains.

## 2020-01-28 NOTE — PATIENT INSTRUCTIONS
Pericarditis: Care Instructions Your Care Instructions Pericarditis occurs when the membrane that surrounds the heart and its major blood vessels becomes inflamed. In most cases, the cause is not known. It can be caused by a virus, a heart attack, or a chest injury. It also can be caused by another type of illness. Pericarditis causes sharp chest pain. This pain gets worse when you lie down or take a deep breath. The pain gets better if you lean forward or sit up. Pericarditis often heals on its own. It usually does not cause any more problems. Most people get better within a couple of weeks. Follow-up care is a key part of your treatment and safety. Be sure to make and go to all appointments, and call your doctor if you are having problems. It's also a good idea to know your test results and keep a list of the medicines you take. How can you care for yourself at home? · Watch for the return of your symptoms. Sometimes pericarditis can come back after it has gone away. · Be safe with medicines. Take your medicines exactly as prescribed. Call your doctor if you think you are having a problem with your medicine. · Ask your doctor if you can take an over-the-counter pain medicine, such as acetaminophen (Tylenol), ibuprofen (Advil, Motrin), or naproxen (Aleve). Read and follow all instructions on the label. · Do not take two or more pain medicines at the same time unless the doctor told you to. Many pain medicines have acetaminophen, which is Tylenol. Too much acetaminophen (Tylenol) can be harmful. · Get plenty of rest until you feel better, especially if you have a fever. · Avoid exercise and strenuous activity that has not been approved by your doctor. Ask your doctor when you can be active again. When should you call for help? Call 911 anytime you think you may need emergency care. For example, call if: 
  · You have severe trouble breathing.  
  · You passed out (lost consciousness).  Call your doctor now or seek immediate medical care if: 
  · You are dizzy or lightheaded, or you feel like you may faint.  
  · You have trouble breathing.  
  · You have a new or higher fever.  
 Watch closely for changes in your health, and be sure to contact your doctor if: 
  · You do not get better as expected. Where can you learn more? Go to http://pablo-cyndi.info/. Enter 759 [id number] in the search box to learn more about \"Pericarditis: Care Instructions. \" Current as of: 2019 Content Version: 12.2 © 1470-3288 [employer]. Care instructions adapted under license by [employer] (which disclaims liability or warranty for this information). If you have questions about a medical condition or this instruction, always ask your healthcare professional. [address] any warranty or liability for your use of this information. Ombitron Activation Thank you for requesting access to Ombitron. Please follow the instructions below to securely access and download your online medical record. Ombitron allows you to send messages to your doctor, view your test results, renew your prescriptions, schedule appointments, and more. How Do I Sign Up? 1. In your internet browser, go to [email or web address]. 2. Click on the First Time User? Click Here link in the Sign In box. You will see the New Member Sign Up page. 3. Enter your Ombitron Access Code exactly as it appears below. You will not need to use this code after youve completed the sign-up process. If you do not sign up before the expiration date, you must request a new code. Ombitron Access Code: [email or web address] Expires: 2020 12:57 PM (This is the date your Ombitron access code will ) 4. Enter the last four digits of your Social Security Number (xxxx) and Date of Birth (mm/dd/yyyy) as indicated and click Submit.  You will be taken to the next sign-up page. 5. Create a Tapomat ID. This will be your Tapomat login ID and cannot be changed, so think of one that is secure and easy to remember. 6. Create a Tapomat password. You can change your password at any time. 7. Enter your Password Reset Question and Answer. This can be used at a later time if you forget your password. 8. Enter your e-mail address. You will receive e-mail notification when new information is available in [address]. 9. Click Sign Up. You can now view and download portions of your medical record. 10. Click the Download Summary menu link to download a portable copy of your medical information. Additional Information If you have questions, please visit the Frequently Asked Questions section of the Tapomat website at [email or web address] Remember, Tapomat is NOT to be used for urgent needs. For medical emergencies, dial 911.

## 2020-01-28 NOTE — PROGRESS NOTES
HISTORY OF PRESENT ILLNESS  Samreen Rainey is a 62 y.o. male. 1/2020. Patient seen in office today with increasing shortness of breath started about a week ago which is progressive associated with orthopnea PND. Denies any chest pain but he is short of breath on minimal activity. He has noted palpitation. 1/27/2020  Mr. Lanre Mckeon was recently admitted to [facility] for pericardial effusion  Pericardial fluid no malignant cell found. pericardial biopsy analysis showing fibrinous pericarditis. He experienced coughing spell Sunday and developed leaking at left chest tube site. He estimates about 6-8 oz of serous drainage. Cholesterol Problem   The history is provided by the patient. This is a chronic problem. The problem occurs constantly. The problem has not changed since onset. Associated symptoms include chest pain (with deep inspiration) and shortness of breath. Pertinent negatives include no abdominal pain and no headaches. Shortness of Breath   The history is provided by the patient. This is a new problem. The problem occurs frequently. The problem has been gradually worsening. Associated symptoms include chest pain (with deep inspiration). Pertinent negatives include no fever, no headaches, no cough, no sputum production, no hemoptysis, no wheezing, no PND, no orthopnea, no vomiting, no abdominal pain, no rash, no leg swelling and no claudication. Precipitated by: exertion. He has tried nothing for the symptoms. Associated medical issues include CAD. Associated medical issues do not include COPD or chronic lung disease. Review of Systems   Constitutional: Positive for malaise/fatigue. Negative for chills and fever. HENT: Negative for nosebleeds. Eyes: Negative for blurred vision and double vision. Respiratory: Positive for shortness of breath. Negative for cough, hemoptysis, sputum production and wheezing. Cardiovascular: Positive for chest pain (with deep inspiration) and palpitations. Negative for orthopnea, claudication, leg swelling and PND. Gastrointestinal: Negative for abdominal pain, heartburn, nausea and vomiting. Musculoskeletal: Negative for myalgias. Skin: Negative for rash. Neurological: Negative for dizziness, weakness and headaches. Endo/Heme/Allergies: Does not bruise/bleed easily. Family History   Problem Relation Age of Onset    Heart Attack Father     Heart Disease Neg Hx        Past Medical History:   Diagnosis Date    Atrial fibrillation ([facility] [address] 01/07/2020    Coronary artery disease     Coronary atherosclerosis of native coronary artery     Stable, no recent angina    ED (erectile dysfunction)     Elevated blood pressure reading without diagnosis of hypertension     Monitor     History of hepatitis C     treated with interferon    Obstructive sleep apnea 1/20/2020    MELANI on CPAP     Other and unspecified hyperlipidemia     Paroxysmal atrial fibrillation ([facility] [address] 1/8/2020    Platelet inhibition due to Plavix     on Eliquis    Postsurgical percutaneous transluminal coronary angioplasty status     Eight years since stent, no recent stent testing, EKG normal       Past Surgical History:   Procedure Laterality Date    HX CORONARY STENT PLACEMENT  2004       Social History     Tobacco Use    Smoking status: Former Smoker     Last attempt to quit: 2010     Years since quitting: 10.0    Smokeless tobacco: Former User    Tobacco comment: Remotely quit tobacco use 2010   Substance Use Topics    Alcohol use: Yes     Comment: Occasional       No Known Allergies    Prior to Admission medications    Medication Sig Start Date End Date Taking? Authorizing Provider   colchicine 0.6 mg tablet Take 1 Tab by mouth daily. 1/28/20  Yes Brannon Boyer NP   naproxen (NAPROSYN) 500 mg tablet Take 1 Tab by mouth two (2) times daily (with meals). 1/28/20  Yes Brannon Boyer NP   aspirin 81 mg chewable tablet Take 1 Tab by mouth daily.  1/24/20  Yes Josefina Grace MD   OTHER Check CBC, CMP, Mg in 4 days, results to PCP immediately, Diagnosis- Pericardial effusion 1/23/20  Yes Delvin Goodwin MD   OTHER Incentive Spirometry- Use as directed 1/23/20  Yes Delvin Goodwin MD   docusate sodium (COLACE) 100 mg capsule Take 1 Cap by mouth two (2) times daily as needed for Constipation. 1/23/20  Yes Delvin Goodwin MD   metoprolol tartrate (LOPRESSOR) 50 mg tablet Take 1 Tab by mouth two (2) times a day. 1/7/20  Yes Aamir Islas MD   rosuvastatin (CRESTOR) 20 mg tablet Take 1 Tab by mouth nightly. 1/7/20  Yes Aamir Islas MD   omega 3-dha-epa-fish oil 60- mg cap Take 500 mg by mouth. Yes Provider, Historical         Visit Vitals  /71   Pulse (!) 58   Ht 5' 11\" (1.803 m)   Wt 115.2 kg (254 lb)   BMI 35.43 kg/m²         Physical Exam   Constitutional: He is oriented to person, place, and time. He appears well-developed and well-nourished. HENT:   Head: Normocephalic and atraumatic. Eyes: Conjunctivae are normal.   Neck: Neck supple. No JVD present. No tracheal deviation present. No thyromegaly present. Cardiovascular: Normal rate and normal heart sounds. An irregular rhythm present. Exam reveals no gallop and no friction rub. No murmur heard. Pulmonary/Chest: Breath sounds normal. No respiratory distress. He has no wheezes. He has no rales. He exhibits no tenderness. Serous drainage from left chest tube site,  Slight erythema, no warmth to site   Abdominal: Soft. There is no abdominal tenderness. Musculoskeletal:         General: No edema. Neurological: He is alert and oriented to person, place, and time. Skin: Skin is warm and dry. Psychiatric: He has a normal mood and affect. Mr. Michael Doll has a reminder for a \"due or due soon\" health maintenance. I have asked that he contact his primary care provider for follow-up on this health maintenance.     SUMMARY:echo-7/2017  Left ventricle: Systolic function was normal. Ejection fraction was  estimated in the range of 55 % to 60 %. There were no regional wall motion  abnormalities. There was moderate concentric hypertrophy. COMPARISONS:  Comparison was made with the previous study of 10-Santhosh-2013. LV thickness  has increased. NUCLEAR IMAGIN2017     Findings:   1. Stress images reveal normal Myoview distrubution in all the LV segments in short axis, vertical and horizontal long axis views. 2. Resting images have a normal uptake. 3. Gated images reveal normal wall motion and the ejection fraction is calculated to be 74%. Conclusion:   1. Normal perfusion scan. 2. Normal wall motion and ejection fraction. 3. Low risk scan.    2020 - Echo  Interpretation Summary   · Normal cavity size and systolic function (ejection fraction normal). Mild concentric hypertrophy. Estimated left ventricular ejection fraction is 55 - 60%. No regional wall motion abnormality noted. Moderate (grade 2) left ventricular diastolic dysfunction. · No hemodynamically significant valvular pathology. · Severely elevated central venous pressure (15+ mmHg); IVC diameter is larger than 21 mm and collapses less than 50% with respiration. · Large circumferential pericardial effusion. Hemodynamic compromise consistent with cardiac tamponade. Comparison Study Information   Prior Study   When compared to the previous study from 17, the large pericardial effusion with evidence of cardiac tamponade is a new finding. 2020 Echo  · Normal cavity size and systolic function (ejection fraction normal). Mildly to moderately increased wall thickness. Estimated left ventricular ejection fraction is 55 - 60%. Visually measured ejection fraction. No regional wall motion abnormality noted. · Trivial pericardial effusion adjacent to right ventricle and right atrium. Comparison Study Information     Prior Study     There is a prior study available for comparison. Prior study date: 2020.  As compared to the previous study, there are significant changes. Pericardial effusion has decreased to a trace amount. 1/2020   PERICARDIAL TISSUE:   FIBRINOUS PERICARDITIS WITH ORGANIZATION AND FIBROSIS. Assessment         ICD-10-CM ICD-9-CM    1. CAD S/P percutaneous coronary angioplasty I25.10 414.01     Z98.61 V45.82     Stable, continue current medications   2. Postsurgical percutaneous transluminal coronary angioplasty status Z98.61 V45.82     Stable   3. Paroxysmal atrial fibrillation (HCC) I48.0 427.31     Continue to hold AC    4. MELANI (obstructive sleep apnea) G47.33 327.23     Improved with CPAP   5. Pericardial effusion with cardiac tamponade I31.3 423.9     I31.4 423.3     Pericardial fluid no malignant cell found. pericardial biopsy analysis showing fibrinous pericarditis. 6. Acute pericarditis, unspecified type I30.9 420.90 colchicine 0.6 mg tablet      naproxen (NAPROSYN) 500 mg tablet      SED RATE (ESR)      RHEUMATOID FACTOR, QT      ANTINUCLEAR ANTIBODIES, IFA    Treat with Colchicine and Naprosyn  Check GEETHA, ESR, and Rhematoid factor     1/2020  Recent admission to [facility] for pericarditis while taking Eliquis. He developed pericardial effusion with cardiac tamponade. He is s/p pericardial window with chest tube. Pericardial fluid was negative for malignant cells, biopsy revealed fibrinous pericarditis. He has continued to drain serous fluid from chest tube site. Continue dressings to site. Will continue to hold eliquis. Treat pericarditis with Colchicine and Naprosyn x 3 months. Underlying etiology of pericarditis undetermined. Check GEETHA, ESR, and Rhematoid factor  There are no discontinued medications.     Orders Placed This Encounter    SED RATE (ESR)     Standing Status:   Future     Standing Expiration Date:   1/28/2021    RHEUMATOID FACTOR, QT     Standing Status:   Future     Standing Expiration Date:   1/28/2021    ANTINUCLEAR ANTIBODIES, IFA     Standing Status:   Future     Standing Expiration Date: 1/28/2021    colchicine 0.6 mg tablet     Sig: Take 1 Tab by mouth daily. Dispense:  90 Tab     Refill:  0    naproxen (NAPROSYN) 500 mg tablet     Sig: Take 1 Tab by mouth two (2) times daily (with meals). Dispense:  180 Tab     Refill:  0       Follow-up and Dispositions    · Return in about 6 weeks (around 3/10/2020), or if symptoms worsen or fail to improve. I have independently evaluated taken history and examined the patient. All relevant labs and testing data's are reviewed. Care plan discussed and updated after review.   Jignesh Rashid MD

## 2020-01-29 ENCOUNTER — TELEPHONE (OUTPATIENT)
Dept: CARDIOTHORACIC SURGERY | Age: 65
End: 2020-01-29

## 2020-01-29 NOTE — TELEPHONE ENCOUNTER
Lisandra Anderson to pt to confirm appt for Thursday 01/30/2020 at 10am.    Confirmed location with patient

## 2020-01-30 ENCOUNTER — HOSPITAL ENCOUNTER (OUTPATIENT)
Dept: GENERAL RADIOLOGY | Age: 65
Discharge: HOME | End: 2020-01-30
Payer: COMMERCIAL

## 2020-01-30 ENCOUNTER — OFFICE VISIT (OUTPATIENT)
Dept: CARDIOTHORACIC SURGERY | Age: 65
End: 2020-01-30

## 2020-01-30 VITALS
BODY MASS INDEX: 34.3 KG/M2 | HEIGHT: 71 IN | DIASTOLIC BLOOD PRESSURE: 74 MMHG | OXYGEN SATURATION: 96 % | TEMPERATURE: 97.6 F | HEART RATE: 58 BPM | SYSTOLIC BLOOD PRESSURE: 127 MMHG | WEIGHT: 245 LBS

## 2020-01-30 DIAGNOSIS — J90 PLEURAL EFFUSION ON LEFT: ICD-10-CM

## 2020-01-30 DIAGNOSIS — Z98.890 S/P PERICARDIAL WINDOW CREATION: ICD-10-CM

## 2020-01-30 DIAGNOSIS — Z51.89 VISIT FOR WOUND CHECK: Primary | ICD-10-CM

## 2020-01-30 PROCEDURE — 71046 X-RAY EXAM CHEST 2 VIEWS: CPT

## 2020-01-30 NOTE — PATIENT INSTRUCTIONS
Okay to remove bandage after 2 days and leave open to air. Use provided bandages if drainage occurs. Follow-up with regular visits with PCP and cardiology. Contact our service should any questions or concerns about the incisions arise.

## 2020-01-30 NOTE — PROGRESS NOTES
[facility] Progress Note      Today's date: 1/30/2020    Interval History:     Seen with Dr. Sherry Lucero. This is the first postoperative visit after discharge on 1/23/2020. Dr. Sherry Lucero  performed an emergent pericardial window and placement of a left pleural chest tube, in the setting of Eliquis treatment, on 1/20/2020. He is progressing very well at home with much more stamina and without shortness of breath or pain. He does report some serous drainage from his left chest tube site. The cytology on the pericardial fluid reveals reactive changes without malignancy. The pathology on the pericardium reveals fibrinous pericarditis with organization and fibrosis. Microbiology on the fluid is no growth. Assessment:     Satisfactory progress after subxiphoid pericardial window and left chest tube placement    Plan:     1. Left chest tube suture removed and site bandaged. May remove bandage in 2 days. Additional bandage supplies provided in the event pleural fluid drains for another several days. He understands to contact us with any questions or concerns  2. Follow-up with PCP and cardiology as scheduled  3. No regular scheduled appointments with our service required. Subjective:     No complaints    Objective:     Admission Weight: Last Weight   Weight: 111.1 kg (245 lb) Weight: 111.1 kg (245 lb)     Visit Vitals  /74 (BP 1 Location: Right arm, BP Patient Position: Sitting)   Pulse (!) 58   Temp 97.6 °F (36.4 °C) (Oral)   Ht 5' 11\" (1.803 m)   Wt 111.1 kg (245 lb)   SpO2 96%   BMI 34.17 kg/m²       Physical Exam:  General: Well-appearing. No apparent distress. Nontoxic. Neck: No JVD or tracheal deviation. Chest: Chest incisions healing well. No drainage expressed. RADIOLOGY:   (independently reviewed) PA lateral chest x-ray, 1/30/2020: No pleural effusion or pneumothorax.     Tyler Console, PA-C    PLEASE NOTE:  This document has been produced using voice recognition software. Unrecognized errors in transcription may be present.

## 2020-01-30 NOTE — PROGRESS NOTES
Chief Complaint   Patient presents with    Post OP Follow Up     1. Have you been to the ER, urgent care clinic since your last visit? Hospitalized since your last visit? No    2. Have you seen or consulted any other health care providers outside of the [address] [place] [address] since your last visit? Include any pap smears or colon screening.  No

## 2020-02-02 LAB
BACTERIA SPEC CULT: ABNORMAL
SOURCE, RSRC56: ABNORMAL

## 2020-02-12 ENCOUNTER — TELEPHONE (OUTPATIENT)
Dept: CARDIOLOGY CLINIC | Age: 65
End: 2020-02-12

## 2020-02-12 NOTE — TELEPHONE ENCOUNTER
----- Message from Amanda Magallon MD sent at 1/15/2020  7:31 AM EST -----  Regarding: RE: testing  Echocardiogram is required for shortness of breath evaluate for CHF, cardiomyopathy, and history of sleep apnea rule out pulmonary hypertension  ----- Message -----  From: Lois Cevallos  Sent: 1/14/2020   3:34 PM EST  To: Amanda Magallon MD  Subject: testing                                          Patient insurance would not approve the echo until nuclear stress was complete, which was done today. Insurance is questioning why do the patient need the echo, was the nuclear results normal or abnormal? Patient is scheduled for a follow up 2/11/2020.  Please advise
Pain management
Pain management

## 2020-02-13 ENCOUNTER — APPOINTMENT (OUTPATIENT)
Dept: NON INVASIVE DIAGNOSTICS | Age: 65
End: 2020-02-13
Attending: NURSE PRACTITIONER
Payer: COMMERCIAL

## 2020-02-13 ENCOUNTER — APPOINTMENT (OUTPATIENT)
Dept: GENERAL RADIOLOGY | Age: 65
End: 2020-02-13
Attending: PHYSICIAN ASSISTANT
Payer: COMMERCIAL

## 2020-02-13 ENCOUNTER — TELEPHONE (OUTPATIENT)
Dept: CARDIOTHORACIC SURGERY | Age: 65
End: 2020-02-13

## 2020-02-13 ENCOUNTER — HOSPITAL ENCOUNTER (EMERGENCY)
Age: 65
Discharge: HOME | End: 2020-02-13
Attending: EMERGENCY MEDICINE
Payer: COMMERCIAL

## 2020-02-13 ENCOUNTER — APPOINTMENT (OUTPATIENT)
Dept: CT IMAGING | Age: 65
End: 2020-02-13
Attending: EMERGENCY MEDICINE
Payer: COMMERCIAL

## 2020-02-13 ENCOUNTER — TELEPHONE (OUTPATIENT)
Dept: CARDIOLOGY CLINIC | Age: 65
End: 2020-02-13

## 2020-02-13 VITALS
RESPIRATION RATE: 19 BRPM | TEMPERATURE: 99 F | HEIGHT: 71 IN | BODY MASS INDEX: 34.3 KG/M2 | SYSTOLIC BLOOD PRESSURE: 160 MMHG | OXYGEN SATURATION: 97 % | WEIGHT: 245 LBS | DIASTOLIC BLOOD PRESSURE: 81 MMHG | HEART RATE: 62 BPM

## 2020-02-13 DIAGNOSIS — R06.02 SOB (SHORTNESS OF BREATH): Primary | ICD-10-CM

## 2020-02-13 LAB
ALBUMIN SERPL-MCNC: 3.2 G/DL (ref 3.4–5)
ALBUMIN/GLOB SERPL: 0.8 {RATIO} (ref 0.8–1.7)
ALP SERPL-CCNC: 195 U/L (ref 45–117)
ALT SERPL-CCNC: 85 U/L (ref 16–61)
ANION GAP SERPL CALC-SCNC: 4 MMOL/L (ref 3–18)
AST SERPL-CCNC: 87 U/L (ref 10–38)
ATRIAL RATE: 61 BPM
BASOPHILS # BLD: 0 K/UL (ref 0–0.1)
BASOPHILS NFR BLD: 0 % (ref 0–2)
BILIRUB SERPL-MCNC: 1.3 MG/DL (ref 0.2–1)
BUN SERPL-MCNC: 12 MG/DL (ref 7–18)
BUN/CREAT SERPL: 17 (ref 12–20)
CALCIUM SERPL-MCNC: 8.2 MG/DL (ref 8.5–10.1)
CALCULATED P AXIS, ECG09: 65 DEGREES
CALCULATED R AXIS, ECG10: 55 DEGREES
CALCULATED T AXIS, ECG11: 42 DEGREES
CHLORIDE SERPL-SCNC: 110 MMOL/L (ref 100–111)
CK MB CFR SERPL CALC: 1.4 % (ref 0–4)
CK MB CFR SERPL CALC: 1.7 % (ref 0–4)
CK MB SERPL-MCNC: 1.7 NG/ML (ref 5–25)
CK MB SERPL-MCNC: 1.7 NG/ML (ref 5–25)
CK SERPL-CCNC: 100 U/L (ref 39–308)
CK SERPL-CCNC: 120 U/L (ref 39–308)
CO2 SERPL-SCNC: 26 MMOL/L (ref 21–32)
CREAT SERPL-MCNC: 0.69 MG/DL (ref 0.6–1.3)
D DIMER PPP FEU-MCNC: 2.01 UG/ML(FEU)
DIAGNOSIS, 93000: NORMAL
DIFFERENTIAL METHOD BLD: ABNORMAL
ECHO LV EDV TEICHHOLZ: 0.62 ML
ECHO LV ESV TEICHHOLZ: 0.27 ML
ECHO LV INTERNAL DIMENSION DIASTOLIC: 4.84 CM (ref 4.2–5.9)
ECHO LV INTERNAL DIMENSION SYSTOLIC: 3.4 CM
ECHO LV IVSD: 1.28 CM (ref 0.6–1)
ECHO LV MASS 2D: 297.3 G (ref 88–224)
ECHO LV MASS INDEX 2D: 129.3 G/M2 (ref 49–115)
ECHO LV POSTERIOR WALL DIASTOLIC: 1.32 CM (ref 0.6–1)
ECHO TV REGURGITANT MAX VELOCITY: 255.28 CM/S
ECHO TV REGURGITANT PEAK GRADIENT: 26.1 MMHG
EOSINOPHIL # BLD: 0.3 K/UL (ref 0–0.4)
EOSINOPHIL NFR BLD: 4 % (ref 0–5)
ERYTHROCYTE [DISTWIDTH] IN BLOOD BY AUTOMATED COUNT: 13.7 % (ref 11.6–14.5)
GLOBULIN SER CALC-MCNC: 4 G/DL (ref 2–4)
GLUCOSE SERPL-MCNC: 120 MG/DL (ref 74–99)
HCT VFR BLD AUTO: 40.6 % (ref 36–48)
HGB BLD-MCNC: 14.1 G/DL (ref 13–16)
LVFS 2D: 29.78 %
LVSV (TEICH): 26.37 ML
LYMPHOCYTES # BLD: 1.2 K/UL (ref 0.9–3.6)
LYMPHOCYTES NFR BLD: 15 % (ref 21–52)
MCH RBC QN AUTO: 33 PG (ref 24–34)
MCHC RBC AUTO-ENTMCNC: 34.7 G/DL (ref 31–37)
MCV RBC AUTO: 95.1 FL (ref 74–97)
MONOCYTES # BLD: 1 K/UL (ref 0.05–1.2)
MONOCYTES NFR BLD: 13 % (ref 3–10)
NEUTS SEG # BLD: 5.3 K/UL (ref 1.8–8)
NEUTS SEG NFR BLD: 68 % (ref 40–73)
P-R INTERVAL, ECG05: 164 MS
PLATELET # BLD AUTO: 90 K/UL (ref 135–420)
PMV BLD AUTO: 11.1 FL (ref 9.2–11.8)
POTASSIUM SERPL-SCNC: 3.8 MMOL/L (ref 3.5–5.5)
PROT SERPL-MCNC: 7.2 G/DL (ref 6.4–8.2)
Q-T INTERVAL, ECG07: 414 MS
QRS DURATION, ECG06: 88 MS
QTC CALCULATION (BEZET), ECG08: 416 MS
RBC # BLD AUTO: 4.27 M/UL (ref 4.7–5.5)
SODIUM SERPL-SCNC: 140 MMOL/L (ref 136–145)
TROPONIN I SERPL-MCNC: <0.02 NG/ML (ref 0–0.04)
TROPONIN I SERPL-MCNC: <0.02 NG/ML (ref 0–0.04)
VENTRICULAR RATE, ECG03: 61 BPM
WBC # BLD AUTO: 7.7 K/UL (ref 4.6–13.2)

## 2020-02-13 PROCEDURE — 85379 FIBRIN DEGRADATION QUANT: CPT

## 2020-02-13 PROCEDURE — 99282 EMERGENCY DEPT VISIT SF MDM: CPT

## 2020-02-13 PROCEDURE — 74011636320 HC RX REV CODE- 636/320: Performed by: EMERGENCY MEDICINE

## 2020-02-13 PROCEDURE — 93308 TTE F-UP OR LMTD: CPT

## 2020-02-13 PROCEDURE — 85025 COMPLETE CBC W/AUTO DIFF WBC: CPT

## 2020-02-13 PROCEDURE — 93005 ELECTROCARDIOGRAM TRACING: CPT

## 2020-02-13 PROCEDURE — 71275 CT ANGIOGRAPHY CHEST: CPT

## 2020-02-13 PROCEDURE — 71046 X-RAY EXAM CHEST 2 VIEWS: CPT

## 2020-02-13 PROCEDURE — 82550 ASSAY OF CK (CPK): CPT

## 2020-02-13 PROCEDURE — 80053 COMPREHEN METABOLIC PANEL: CPT

## 2020-02-13 RX ADMIN — IOPAMIDOL 100 ML: 755 INJECTION, SOLUTION INTRAVENOUS at 16:55

## 2020-02-13 NOTE — ED NOTES
I performed a brief evaluation, including history and physical, of the patient here in triage and I have determined that pt will need further treatment and evaluation from the main side ER physician. I have placed initial orders to help in expediting patients care.      February 13, 2020 at 1:07 PM - Rox Campbell PA-C        Visit Vitals  /81 (BP 1 Location: Left arm, BP Patient Position: Sitting)   Pulse 62   Temp 99 °F (37.2 °C)   Resp 19   SpO2 97%

## 2020-02-13 NOTE — ROUTINE PROCESS
I have reviewed discharge instructions with the patient. The patient verbalized understanding. IV and armband removed. Patient escorted to emergency department main lobby for discharge.

## 2020-02-13 NOTE — PROGRESS NOTES
Echocardiogram completed. Patient returned to room with armband in place. Report to follow.     [address] [facility]

## 2020-02-13 NOTE — ED TRIAGE NOTES
Pt presents with c/o SOB and low grade fever.  Reports had pericardial effusion on 1/20/2020 and the symptoms he is experiencing are similar to when he had that incident

## 2020-02-13 NOTE — DISCHARGE INSTRUCTIONS

## 2020-02-13 NOTE — ED PROVIDER NOTES
EMERGENCY DEPARTMENT HISTORY AND PHYSICAL EXAM    1:53 PM      Date: 2/13/2020  Patient Name: Jessica Salazar    History of Presenting Illness     Chief Complaint   Patient presents with    Shortness of Breath         History Provided By: Patient    Additional History (Context): Jessica Salazar is a 62 y.o. male with hypertension, hyperlipidemia and Hepatitis, atrial fib, pericardial effusion who presents with shortness of breath since last night. Patient had pericardial effusion with a cardiac window next week. Patient states he had shortness of breath that felt like when he came to the emergency room then. Patient is complaining of tactile fevers and chills. Patient has intermittent chest pain feels like pressure worse with deep inspiration. Patient additionally has cough which is clear discharge. Patient denies smoking. He does drink 2-3 beers per day. He he denies any recreational drugs. PCP: Carlos A Crawford MD        Current Outpatient Medications   Medication Sig Dispense Refill    colchicine 0.6 mg tablet Take 1 Tab by mouth daily. 90 Tab 0    naproxen (NAPROSYN) 500 mg tablet Take 1 Tab by mouth two (2) times daily (with meals). 180 Tab 0    aspirin 81 mg chewable tablet Take 1 Tab by mouth daily. 30 Tab 0    OTHER Check CBC, CMP, Mg in 4 days, results to PCP immediately, Diagnosis- Pericardial effusion 1 Each 0    metoprolol tartrate (LOPRESSOR) 50 mg tablet Take 1 Tab by mouth two (2) times a day. 60 Tab 3    rosuvastatin (CRESTOR) 20 mg tablet Take 1 Tab by mouth nightly. 30 Tab 3    omega 3-dha-epa-fish oil 60- mg cap Take 500 mg by mouth.          Past History     Past Medical History:  Past Medical History:   Diagnosis Date    Atrial fibrillation ([address] 01/07/2020    Coronary artery disease     Coronary atherosclerosis of native coronary artery     Stable, no recent angina    ED (erectile dysfunction)     Elevated blood pressure reading without diagnosis of hypertension Monitor     History of hepatitis C     treated with interferon    Obstructive sleep apnea 1/20/2020    MELANI on CPAP     Other and unspecified hyperlipidemia     Paroxysmal atrial fibrillation (HCC) 1/8/2020    Platelet inhibition due to Plavix     on Eliquis    Postsurgical percutaneous transluminal coronary angioplasty status     Eight years since stent, no recent stent testing, EKG normal       Past Surgical History:  Past Surgical History:   Procedure Laterality Date    HX CORONARY STENT PLACEMENT  2004       Family History:  Family History   Problem Relation Age of Onset    Heart Attack Father     Heart Disease Neg Hx        Social History:  Social History     Tobacco Use    Smoking status: Former Smoker     Last attempt to quit: 2010     Years since quitting: 10.1    Smokeless tobacco: Former User    Tobacco comment: Remotely quit tobacco use 2010   Substance Use Topics    Alcohol use: Yes     Comment: Occasional    Drug use: Not on file       Allergies:  No Known Allergies      Review of Systems       Review of Systems   Constitutional: Negative. Negative for chills, diaphoresis and fever. HENT: Negative. Negative for congestion, rhinorrhea and sore throat. Eyes: Negative. Negative for pain, discharge and redness. Respiratory: Negative. Negative for cough, chest tightness, shortness of breath and wheezing. Cardiovascular: Negative. Negative for chest pain. Gastrointestinal: Negative. Negative for abdominal pain, constipation, diarrhea, nausea and vomiting. Genitourinary: Negative. Negative for dysuria, flank pain, frequency, hematuria and urgency. Musculoskeletal: Negative. Negative for back pain and neck pain. Skin: Negative. Negative for rash. Neurological: Negative. Negative for syncope, weakness, numbness and headaches. Psychiatric/Behavioral: Negative. All other systems reviewed and are negative.         Physical Exam     Visit Vitals  /81   Pulse 62 Temp 99 °F (37.2 °C)   Resp 19   Ht 5' 11\" (1.803 m)   Wt 111.1 kg (245 lb)   SpO2 97%   BMI 34.17 kg/m²         Physical Exam  Vitals signs and nursing note reviewed. Constitutional:       General: He is not in acute distress. Appearance: Normal appearance. He is well-developed. He is not ill-appearing, toxic-appearing or diaphoretic. HENT:      Head: Normocephalic and atraumatic. Mouth/Throat:      Pharynx: No oropharyngeal exudate. Eyes:      General: No scleral icterus. Conjunctiva/sclera: Conjunctivae normal.      Pupils: Pupils are equal, round, and reactive to light. Neck:      Musculoskeletal: Normal range of motion and neck supple. Thyroid: No thyromegaly. Vascular: No hepatojugular reflux or JVD. Trachea: No tracheal deviation. Cardiovascular:      Rate and Rhythm: Normal rate and regular rhythm. Pulses: Normal pulses. Radial pulses are 2+ on the right side and 2+ on the left side. Dorsalis pedis pulses are 2+ on the right side and 2+ on the left side. Heart sounds: Normal heart sounds, S1 normal and S2 normal. No murmur. No gallop. No S3 or S4 sounds. Pulmonary:      Effort: Pulmonary effort is normal. No respiratory distress. Breath sounds: Normal breath sounds. No decreased breath sounds, wheezing, rhonchi or rales. Abdominal:      General: Bowel sounds are normal. There is no distension. Palpations: Abdomen is soft. Abdomen is not rigid. There is no mass. Tenderness: There is no abdominal tenderness. There is no guarding or rebound. Negative signs include Martinez's sign and McBurney's sign. Musculoskeletal: Normal range of motion. Comments: Strength 5 out of 5 throughout. Lymphadenopathy:      Head:      Right side of head: No submental, submandibular, preauricular or occipital adenopathy. Left side of head: No submental, submandibular, preauricular or occipital adenopathy.       Cervical: No cervical adenopathy. Upper Body:      Right upper body: No supraclavicular adenopathy. Left upper body: No supraclavicular adenopathy. Skin:     General: Skin is warm and dry. Findings: No rash. Neurological:      Mental Status: He is alert. He is not disoriented. GCS: GCS eye subscore is 4. GCS verbal subscore is 5. GCS motor subscore is 6. Cranial Nerves: No cranial nerve deficit. Sensory: No sensory deficit. Coordination: Coordination normal.      Gait: Gait normal.      Deep Tendon Reflexes: Reflexes are normal and symmetric. Comments: Grossly intact. Psychiatric:         Speech: Speech normal.         Behavior: Behavior normal.         Thought Content: Thought content normal.         Judgment: Judgment normal.           Diagnostic Study Results     Labs -  Recent Results (from the past 12 hour(s))   EKG, 12 LEAD, INITIAL    Collection Time: 02/13/20  1:11 PM   Result Value Ref Range    Ventricular Rate 61 BPM    Atrial Rate 61 BPM    P-R Interval 164 ms    QRS Duration 88 ms    Q-T Interval 414 ms    QTC Calculation (Bezet) 416 ms    Calculated P Axis 65 degrees    Calculated R Axis 55 degrees    Calculated T Axis 42 degrees    Diagnosis       Normal sinus rhythm  Normal ECG  When compared with ECG of 20-JAN-2020 11:00,  Vent.  rate has decreased BY  39 BPM  Nonspecific T wave abnormality no longer evident in Lateral leads  Confirmed by Laverne Cavazos MD, ----- ([id number]) on 2/13/2020 4:09:04 PM     CBC WITH AUTOMATED DIFF    Collection Time: 02/13/20  1:18 PM   Result Value Ref Range    WBC 7.7 4.6 - 13.2 K/uL    RBC 4.27 (L) 4.70 - 5.50 M/uL    HGB 14.1 13.0 - 16.0 g/dL    HCT 40.6 36.0 - 48.0 %    MCV 95.1 74.0 - 97.0 FL    MCH 33.0 24.0 - 34.0 PG    MCHC 34.7 31.0 - 37.0 g/dL    RDW 13.7 11.6 - 14.5 %    PLATELET 90 (L) 361 - 420 K/uL    MPV 11.1 9.2 - 11.8 FL    NEUTROPHILS 68 40 - 73 %    LYMPHOCYTES 15 (L) 21 - 52 %    MONOCYTES 13 (H) 3 - 10 %    EOSINOPHILS 4 0 - 5 % BASOPHILS 0 0 - 2 %    ABS. NEUTROPHILS 5.3 1.8 - 8.0 K/UL    ABS. LYMPHOCYTES 1.2 0.9 - 3.6 K/UL    ABS. MONOCYTES 1.0 0.05 - 1.2 K/UL    ABS. EOSINOPHILS 0.3 0.0 - 0.4 K/UL    ABS. BASOPHILS 0.0 0.0 - 0.1 K/UL    DF AUTOMATED     METABOLIC PANEL, COMPREHENSIVE    Collection Time: 02/13/20  1:18 PM   Result Value Ref Range    Sodium 140 136 - 145 mmol/L    Potassium 3.8 3.5 - 5.5 mmol/L    Chloride 110 100 - 111 mmol/L    CO2 26 21 - 32 mmol/L    Anion gap 4 3.0 - 18 mmol/L    Glucose 120 (H) 74 - 99 mg/dL    BUN 12 7.0 - 18 MG/DL    Creatinine 0.69 0.6 - 1.3 MG/DL    BUN/Creatinine ratio 17 12 - 20      GFR est AA >60 >60 ml/min/1.73m2    GFR est non-AA >60 >60 ml/min/1.73m2    Calcium 8.2 (L) 8.5 - 10.1 MG/DL    Bilirubin, total 1.3 (H) 0.2 - 1.0 MG/DL    ALT (SGPT) 85 (H) 16 - 61 U/L    AST (SGOT) 87 (H) 10 - 38 U/L    Alk.  phosphatase 195 (H) 45 - 117 U/L    Protein, total 7.2 6.4 - 8.2 g/dL    Albumin 3.2 (L) 3.4 - 5.0 g/dL    Globulin 4.0 2.0 - 4.0 g/dL    A-G Ratio 0.8 0.8 - 1.7     CARDIAC PANEL,(CK, CKMB & TROPONIN)    Collection Time: 02/13/20  1:18 PM   Result Value Ref Range     39 - 308 U/L    CK - MB 1.7 <3.6 ng/ml    CK-MB Index 1.7 0.0 - 4.0 %    Troponin-I, QT <0.02 0.0 - 0.045 NG/ML   ECHO ADULT FOLLOW-UP OR LIMITED    Collection Time: 02/13/20  2:30 PM   Result Value Ref Range    LVIDd 4.84 4.2 - 5.9 cm    LVPWd 1.32 (A) 0.6 - 1.0 cm    LVIDs 3.40 cm    IVSd 1.28 (A) 0.6 - 1.0 cm    LV Mass .3 (A) 88 - 224 g    LV Mass AL Index 129.3 49 - 115 g/m2    Triscuspid Valve Regurgitation Peak Gradient 26.1 mmHg    TR Max Velocity 255.28 cm/s    Left Ventricular Fractional Shortening by 2D 72.24814447 %    Left Ventricular End Diastolic Volume by Teichholz Method 6.345775785 mL    Left Ventricular End Systolic Volume by Teichholz Method 5.8111006172 mL    Left Ventricular Stroke Volume by Teichholz Method 43.329754528 mL   D DIMER    Collection Time: 02/13/20  3:44 PM   Result Value Ref Range    D DIMER 2.01 (H) <0.46 ug/ml(FEU)   CARDIAC PANEL,(CK, CKMB & TROPONIN)    Collection Time: 02/13/20  3:44 PM   Result Value Ref Range     39 - 308 U/L    CK - MB 1.7 <3.6 ng/ml    CK-MB Index 1.4 0.0 - 4.0 %    Troponin-I, QT <0.02 0.0 - 0.045 NG/ML       Radiologic Studies -   CTA CHEST W OR W WO CONT   Final Result   IMPRESSION:           1. Possible \"filling defect\" in the right lower lobe subsegmental branch   artery. Not convincing for PE. No definite corresponding abnormality on the   coronal and sagittal reconstruction images. Suspect volume averaging artifact. No convincing evidence of pulmonary embolism elsewhere. 2.  Small left pleural effusion. 3.  Questionable soft tissue density in the subxiphoid region. Query recent   pericardial window or other types of drainage procedure with residual scar   tissue. 4.  Cirrhosis. Recanalization of the umbilical vein, indicative of portal   hypertension. XR CHEST PA LAT   Final Result    IMPRESSION:      1. Unchanged sequela of minimal congestion. Medical Decision Making   Provider Notes (Medical Decision Making):  MDM  Number of Diagnoses or Management Options  SOB (shortness of breath):   Diagnosis management comments: Pericardial effusion        I am the first provider for this patient. I reviewed the vital signs, available nursing notes, past medical history, past surgical history, family history and social history. Vital Signs-Reviewed the patient's vital signs. Records Reviewed: Nursing Notes (Time of Review: 1:53 PM)    ED Course: Progress Notes, Reevaluation, and Consults:    Labs essentially normal.  Chest X-Ray showed No acute process. EKG showed NSR with rate of 61 bpm. With no ST elevations or depression and non specific T wave changes. 3:53 PM 2/13/2020    Consult:  Discussed care with Dr Randell Zheng. Cardiology.  Standard discussion; including history of patients chief complaint, available diagnostic results, and treatment course. Recommends repeat cardiac enzymes and d-dimer. If negative discharge home. 4:32 PM    D-dimer 2.01. CTa chest ordered. CTA reviewed and discussed with patient. No acute process. Diagnosis       I have reassessed the patient. Patient is feeling well. Patient was discharged in stable condition. Patient is to return to emergency department if any new or worsening condition. Clinical Impression:   1. SOB (shortness of breath)        Disposition: Discharge home    Follow-up Information     Follow up With Specialties Details Why Dayana Ricketts MD [facility] In 2 days  [address] [facility]  Missy Gabriel MD Cardiology, Internal Medicine In 1 day  [address] [place], [place] [id number]               Attestation        Provider Attestation:     I personally performed the services described in the documentation, reviewed the documentation and it accurately and completely records my words and actions utilizing the [address] [place] February 13, 2020 at 6:33 PM - [place], [address]. It is dictated using utilizing voice recognition software. Unfortunately this leads to occasional typographical errors. I apologize in advance if the situation occurs. If questions arise please do not hesitate to contact me or call our department.

## 2020-02-13 NOTE — TELEPHONE ENCOUNTER
Pt's wife Daniel Brunson called stating that her  has been extremely tired since surgery, not really having a lot of energy. This morning  woke up and stated that he is feeling the same way he felt before surgery. Shortness of breath, running a fever. 2 days ago, she noticed that his leg was swollen and hot.

## 2020-02-25 ENCOUNTER — TELEPHONE (OUTPATIENT)
Dept: CARDIOLOGY CLINIC | Age: 65
End: 2020-02-25

## 2020-02-25 NOTE — TELEPHONE ENCOUNTER
Patient will be in tomorrow to see doctor for incision site is red, swollen and painful and draining. He voices understanding and acceptance of this advice and will call back if any further questions or concerns.

## 2020-02-26 ENCOUNTER — OFFICE VISIT (OUTPATIENT)
Dept: CARDIOTHORACIC SURGERY | Age: 65
End: 2020-02-26

## 2020-02-26 ENCOUNTER — OFFICE VISIT (OUTPATIENT)
Dept: CARDIOLOGY CLINIC | Age: 65
End: 2020-02-26

## 2020-02-26 VITALS
WEIGHT: 251 LBS | DIASTOLIC BLOOD PRESSURE: 80 MMHG | HEIGHT: 71 IN | HEART RATE: 72 BPM | SYSTOLIC BLOOD PRESSURE: 156 MMHG | BODY MASS INDEX: 35.14 KG/M2

## 2020-02-26 VITALS
TEMPERATURE: 97.5 F | SYSTOLIC BLOOD PRESSURE: 146 MMHG | OXYGEN SATURATION: 99 % | HEIGHT: 71 IN | BODY MASS INDEX: 35.14 KG/M2 | HEART RATE: 78 BPM | DIASTOLIC BLOOD PRESSURE: 76 MMHG | WEIGHT: 251 LBS

## 2020-02-26 DIAGNOSIS — Z98.61 CAD S/P PERCUTANEOUS CORONARY ANGIOPLASTY: ICD-10-CM

## 2020-02-26 DIAGNOSIS — Z98.890 STATUS POST CREATION OF PERICARDIAL WINDOW: ICD-10-CM

## 2020-02-26 DIAGNOSIS — I25.10 CAD S/P PERCUTANEOUS CORONARY ANGIOPLASTY: ICD-10-CM

## 2020-02-26 DIAGNOSIS — T81.30XA WOUND DEHISCENCE: ICD-10-CM

## 2020-02-26 DIAGNOSIS — G47.33 OSA (OBSTRUCTIVE SLEEP APNEA): ICD-10-CM

## 2020-02-26 DIAGNOSIS — I48.0 PAROXYSMAL ATRIAL FIBRILLATION (HCC): Primary | ICD-10-CM

## 2020-02-26 DIAGNOSIS — Z98.890 S/P PERICARDIAL WINDOW CREATION: Primary | ICD-10-CM

## 2020-02-26 PROBLEM — E66.01 SEVERE OBESITY (HCC): Status: ACTIVE | Noted: 2020-02-26

## 2020-02-26 RX ORDER — CEPHALEXIN 500 MG/1
500 CAPSULE ORAL 4 TIMES DAILY
Qty: 28 CAP | Refills: 0 | Status: SHIPPED | OUTPATIENT
Start: 2020-02-26 | End: 2020-03-04

## 2020-02-26 NOTE — PATIENT INSTRUCTIONS
Atrial Fibrillation: Care Instructions  Your Care Instructions    Atrial fibrillation is an irregular and often fast heartbeat. Treating this condition is important for several reasons. It can cause blood clots, which can travel from your heart to your brain and cause a stroke. If you have a fast heartbeat, you may feel lightheaded, dizzy, and weak. An irregular heartbeat can also increase your risk for heart failure. Atrial fibrillation is often the result of another heart condition, such as high blood pressure or coronary artery disease. Making changes to improve your heart condition will help you stay healthy and active. Follow-up care is a key part of your treatment and safety. Be sure to make and go to all appointments, and call your doctor if you are having problems. It's also a good idea to know your test results and keep a list of the medicines you take. How can you care for yourself at home? Medicines    · Take your medicines exactly as prescribed. Call your doctor if you think you are having a problem with your medicine. You will get more details on the specific medicines your doctor prescribes.     · If your doctor has given you a blood thinner to prevent a stroke, be sure you get instructions about how to take your medicine safely. Blood thinners can cause serious bleeding problems.     · Do not take any vitamins, over-the-counter drugs, or herbal products without talking to your doctor first.    Lifestyle changes    · Do not smoke. Smoking can increase your chance of a stroke and heart attack. If you need help quitting, talk to your doctor about stop-smoking programs and medicines. These can increase your chances of quitting for good.     · Eat a heart-healthy diet.     · Stay at a healthy weight. Lose weight if you need to.     · Limit alcohol to 2 drinks a day for men and 1 drink a day for women. Too much alcohol can cause health problems.     · Avoid colds and flu.  Get a pneumococcal vaccine shot. If you have had one before, ask your doctor whether you need another dose. Get a flu shot every year. If you must be around people with colds or flu, wash your hands often. Activity    · If your doctor recommends it, get more exercise. Walking is a good choice. Bit by bit, increase the amount you walk every day. Try for at least 30 minutes on most days of the week. You also may want to swim, bike, or do other activities. Your doctor may suggest that you join a cardiac rehabilitation program so that you can have help increasing your physical activity safely.     · Start light exercise if your doctor says it is okay. Even a small amount will help you get stronger, have more energy, and manage stress. Walking is an easy way to get exercise. Start out by walking a little more than you did in the hospital. Gradually increase the amount you walk.     · When you exercise, watch for signs that your heart is working too hard. You are pushing too hard if you cannot talk while you are exercising. If you become short of breath or dizzy or have chest pain, sit down and rest immediately.     · Check your pulse regularly. Place two fingers on the artery at the palm side of your wrist, in line with your thumb. If your heartbeat seems uneven or fast, talk to your doctor. When should you call for help? Call 911 anytime you think you may need emergency care. For example, call if:    · You have symptoms of a heart attack. These may include:  ? Chest pain or pressure, or a strange feeling in the chest.  ? Sweating. ? Shortness of breath. ? Nausea or vomiting. ? Pain, pressure, or a strange feeling in the back, neck, jaw, or upper belly or in one or both shoulders or arms. ? Lightheadedness or sudden weakness. ? A fast or irregular heartbeat. After you call 911, the  may tell you to chew 1 adult-strength or 2 to 4 low-dose aspirin. Wait for an ambulance.  Do not try to drive yourself.     · You have symptoms of a stroke. These may include:  ? Sudden numbness, tingling, weakness, or loss of movement in your face, arm, or leg, especially on only one side of your body. ? Sudden vision changes. ? Sudden trouble speaking. ? Sudden confusion or trouble understanding simple statements. ? Sudden problems with walking or balance. ? A sudden, severe headache that is different from past headaches.     · You passed out (lost consciousness).    Call your doctor now or seek immediate medical care if:    · You have new or increased shortness of breath.     · You feel dizzy or lightheaded, or you feel like you may faint.     · Your heart rate becomes irregular.     · You can feel your heart flutter in your chest or skip heartbeats. Tell your doctor if these symptoms are new or worse.    Watch closely for changes in your health, and be sure to contact your doctor if you have any problems. Where can you learn more? Go to http://pablo-cyndi.info/. Enter U020 in the search box to learn more about \"Atrial Fibrillation: Care Instructions. \"  Current as of: April 9, 2019  Content Version: 12.2  © 2815-6920 [employer]. Care instructions adapted under license by [employer] (which disclaims liability or warranty for this information). If you have questions about a medical condition or this instruction, always ask your healthcare professional. [address] any warranty or liability for your use of this information. Zaz[employer] Activation    Thank you for requesting access to [employer]. Please follow the instructions below to securely access and download your online medical record. Z[employer] allows you to send messages to your doctor, view your test results, renew your prescriptions, schedule appointments, and more. How Do I Sign Up? 1. In your internet browser, go to [email or web address]. 2. Click on the First Time User?  Click Here link in the Sign In box. You will see the New Member Sign Up page. 3. Enter your bright box Access Code exactly as it appears below. You will not need to use this code after youve completed the sign-up process. If you do not sign up before the expiration date, you must request a new code. bright box Access Code: [email or web address]  Expires: 2020 10:57 AM (This is the date your bright box access code will )    4. Enter the last four digits of your Social Security Number (xxxx) and Date of Birth (mm/dd/yyyy) as indicated and click Submit. You will be taken to the next sign-up page. 5. Create a bright box ID. This will be your bright box login ID and cannot be changed, so think of one that is secure and easy to remember. 6. Create a bright box password. You can change your password at any time. 7. Enter your Password Reset Question and Answer. This can be used at a later time if you forget your password. 8. Enter your e-mail address. You will receive e-mail notification when new information is available in [address] 9. Click Sign Up. You can now view and download portions of your medical record. 10. Click the Download Summary menu link to download a portable copy of your medical information. Additional Information    If you have questions, please visit the Frequently Asked Questions section of the bright box website at [email or web address] Remember, bright box is NOT to be used for urgent needs. For medical emergencies, dial 911.

## 2020-02-26 NOTE — PROGRESS NOTES
Chief Complaint   Patient presents with    Wound Check     Patient complaining of redness and pain at sternal incision with clear/bloody discharge. He states this started about 5 days ago. 1. Have you been to the ER, urgent care clinic since your last visit? Hospitalized since your last visit? Yes for shortness of breath    2. Have you seen or consulted any other health care providers outside of the [address] [place] [address] since your last visit? Include any pap smears or colon screening.  No

## 2020-02-26 NOTE — PROGRESS NOTES
HISTORY OF PRESENT ILLNESS  Kimberlee Mayer is a 62 y.o. male. 1/2020. Patient seen in office today with increasing shortness of breath started about a week ago which is progressive associated with orthopnea PND. Denies any chest pain but he is short of breath on minimal activity. He has noted palpitation. 1/27/2020  Mr. Tameka Ferro was recently admitted to [facility] for pericardial effusion  Pericardial fluid no malignant cell found. pericardial biopsy analysis showing fibrinous pericarditis. He experienced coughing spell Sunday and developed leaking at left chest tube site. He estimates about 6-8 oz of serous drainage. Shortness of Breath   The history is provided by the patient. This is a new problem. The problem occurs intermittently. The problem has been gradually improving. Pertinent negatives include no wheezing, no rash and no leg swelling. Precipitated by: exertion. He has tried nothing for the symptoms. Associated medical issues include CAD. Associated medical issues do not include COPD or chronic lung disease. Review of Systems   Constitutional: Negative for chills. HENT: Negative for nosebleeds. Eyes: Negative for blurred vision and double vision. Respiratory: Positive for shortness of breath. Negative for wheezing. Cardiovascular: Negative for leg swelling. Gastrointestinal: Negative for heartburn. Musculoskeletal: Negative for myalgias. Skin: Negative for rash. Endo/Heme/Allergies: Does not bruise/bleed easily.      Family History   Problem Relation Age of Onset    Heart Attack Father     Heart Disease Neg Hx        Past Medical History:   Diagnosis Date    Atrial fibrillation ([facility] [address] [address] 01/07/2020    Coronary artery disease     Coronary atherosclerosis of native coronary artery     Stable, no recent angina    ED (erectile dysfunction)     Elevated blood pressure reading without diagnosis of hypertension     Monitor     History of hepatitis C     treated with interferon  Obstructive sleep apnea 1/20/2020    MELANI on CPAP     Other and unspecified hyperlipidemia     Paroxysmal atrial fibrillation (HCC) 1/8/2020    Platelet inhibition due to Plavix     on Eliquis    Postsurgical percutaneous transluminal coronary angioplasty status     Eight years since stent, no recent stent testing, EKG normal       Past Surgical History:   Procedure Laterality Date    HX CORONARY STENT PLACEMENT  2004       Social History     Tobacco Use    Smoking status: Former Smoker     Last attempt to quit: 2010     Years since quitting: 10.1    Smokeless tobacco: Former User    Tobacco comment: Remotely quit tobacco use 2010   Substance Use Topics    Alcohol use: Yes     Comment: Occasional       No Known Allergies    Prior to Admission medications    Medication Sig Start Date End Date Taking? Authorizing Provider   colchicine 0.6 mg tablet Take 1 Tab by mouth daily. 1/28/20  Yes Brannon Boyer NP   naproxen (NAPROSYN) 500 mg tablet Take 1 Tab by mouth two (2) times daily (with meals). 1/28/20  Yes Brannon Boyer NP   aspirin 81 mg chewable tablet Take 1 Tab by mouth daily. 1/24/20  Yes Kurtis Kovacs MD   OTHER Check CBC, CMP, Mg in 4 days, results to PCP immediately, Diagnosis- Pericardial effusion 1/23/20  Yes Kurtis Kovacs MD   metoprolol tartrate (LOPRESSOR) 50 mg tablet Take 1 Tab by mouth two (2) times a day. 1/7/20  Yes Camron Dennison MD   rosuvastatin (CRESTOR) 20 mg tablet Take 1 Tab by mouth nightly. 1/7/20  Yes Camron Dennison MD   omega 7-jai-lim-fish oil 60- mg cap Take 500 mg by mouth. Yes Provider, Historical         Visit Vitals  /80   Pulse 72   Ht 5' 11\" (1.803 m)   Wt 113.9 kg (251 lb)   BMI 35.01 kg/m²         Physical Exam   Constitutional: He is oriented to person, place, and time. He appears well-developed and well-nourished. HENT:   Head: Normocephalic and atraumatic. Eyes: Conjunctivae are normal.   Neck: Neck supple. No JVD present.  No tracheal deviation present. No thyromegaly present. Cardiovascular: Normal rate and normal heart sounds. An irregular rhythm present. Exam reveals no gallop and no friction rub. No murmur heard. Pulmonary/Chest: Breath sounds normal. No respiratory distress. He has no wheezes. He has no rales. He exhibits no tenderness. Serous drainage from left chest tube site,  Slight erythema, no warmth to site   Abdominal: Soft. There is no abdominal tenderness. Musculoskeletal:         General: No edema. Neurological: He is alert and oriented to person, place, and time. Skin: Skin is warm and dry. Psychiatric: He has a normal mood and affect. Mr. Lord Cintron has a reminder for a \"due or due soon\" health maintenance. I have asked that he contact his primary care provider for follow-up on this health maintenance. SUMMARY:echo-2017  Left ventricle: Systolic function was normal. Ejection fraction was  estimated in the range of 55 % to 60 %. There were no regional wall motion  abnormalities. There was moderate concentric hypertrophy. COMPARISONS:  Comparison was made with the previous study of 10-Santhosh-2013. LV thickness  has increased. NUCLEAR IMAGIN2017     Findings:   1. Stress images reveal normal Myoview distrubution in all the LV segments in short axis, vertical and horizontal long axis views. 2. Resting images have a normal uptake. 3. Gated images reveal normal wall motion and the ejection fraction is calculated to be 74%. Conclusion:   1. Normal perfusion scan. 2. Normal wall motion and ejection fraction. 3. Low risk scan.    2020 - Echo  Interpretation Summary   · Normal cavity size and systolic function (ejection fraction normal). Mild concentric hypertrophy. Estimated left ventricular ejection fraction is 55 - 60%. No regional wall motion abnormality noted. Moderate (grade 2) left ventricular diastolic dysfunction. · No hemodynamically significant valvular pathology.   · Severely elevated central venous pressure (15+ mmHg); IVC diameter is larger than 21 mm and collapses less than 50% with respiration. · Large circumferential pericardial effusion. Hemodynamic compromise consistent with cardiac tamponade. Comparison Study Information   Prior Study   When compared to the previous study from 7/6/17, the large pericardial effusion with evidence of cardiac tamponade is a new finding. 1/23/2020 Echo  · Normal cavity size and systolic function (ejection fraction normal). Mildly to moderately increased wall thickness. Estimated left ventricular ejection fraction is 55 - 60%. Visually measured ejection fraction. No regional wall motion abnormality noted. · Trivial pericardial effusion adjacent to right ventricle and right atrium. Comparison Study Information     Prior Study     There is a prior study available for comparison. Prior study date: 1/20/2020. As compared to the previous study, there are significant changes. Pericardial effusion has decreased to a trace amount. 1/2020   PERICARDIAL TISSUE:   FIBRINOUS PERICARDITIS WITH ORGANIZATION AND FIBROSIS. Assessment         ICD-10-CM ICD-9-CM    1. Paroxysmal atrial fibrillation (HCC) I48.0 427.31    2. CAD S/P percutaneous coronary angioplasty I25.10 414.01     Z98.61 V45.82    3. MELANI (obstructive sleep apnea) G47.33 327.23    4. Status post creation of pericardial window Z98.890 V45.89      1/2020  Recent admission to [facility] for pericarditis while taking Eliquis. He developed pericardial effusion with cardiac tamponade. He is s/p pericardial window with chest tube. Pericardial fluid was negative for malignant cells, biopsy revealed fibrinous pericarditis. He has continued to drain serous fluid from chest tube site. Continue dressings to site. Will continue to hold eliquis. Treat pericarditis with Colchicine and Naprosyn x 3 months. Underlying etiology of pericarditis undetermined.   Check GEETHA, ESR, and Rhematoid factor  There are no discontinued medications. No orders of the defined types were placed in this encounter. Follow-up and Dispositions    · Return in about 3 months (around 5/26/2020). Seen for follow-up. Was recently emergency room with shortness of breath. Repeat echo revealed no pericardial effusion. Shortness of breath is progressively improved since then. On examination has mild erythema around the pericardial window site. Discussed case with cardiothoracic PA. Will see patient today. Follow-up in 3 months with Dr. Burnetta Goltz. Will consider resuming oral anticoagulation in the next appointment.

## 2020-02-26 NOTE — PROGRESS NOTES
1. Have you been to the ER, urgent care clinic since your last visit? Hospitalized since your last visit?     no    2. Have you seen or consulted any other health care providers outside of the [address] [place] [address] since your last visit? Include any pap smears or colon screening. No     3. Since your last visit, have you had any of the following symptoms?    no

## 2020-02-26 NOTE — PATIENT INSTRUCTIONS
Fill antibiotic prescription  Call with any questions or concerns [phone]. Call for any sign or symptoms of infection. Infection After Surgery: Care Instructions  Your Care Instructions  After surgery, an infection is always possible. It doesn't mean that the surgery didn't go well. Because an infection can be serious, your doctor has taken steps to manage it. Your doctor checked the infection and cleaned it if necessary. He or she may have made an opening in the area so that the pus can drain out. You may have gauze in the cut so that the area will stay open and keep draining. You may need antibiotics. You will need to follow up with your doctor to make sure the infection has gone away. Follow-up care is a key part of your treatment and safety. Be sure to make and go to all appointments, and call your doctor if you are having problems. It's also a good idea to know your test results and keep a list of the medicines you take. How can you care for yourself at home? · Make sure your surgeon knows that you saw a doctor about the infection. · If your doctor prescribed antibiotics, take them as directed. Do not stop taking them just because you feel better. You need to take the full course of antibiotics. · Ask your doctor if you can take an over-the-counter pain medicine, such as acetaminophen (Tylenol), ibuprofen (Advil, Motrin), or naproxen (Aleve). Be safe with medicines. Read and follow all instructions on the label. · Do not take two or more pain medicines at the same time unless the doctor told you to. Many pain medicines have acetaminophen, which is Tylenol. Too much acetaminophen (Tylenol) can be harmful. · Prop up the area on a pillow anytime you sit or lie down during the next 3 days. Try to keep it above the level of your heart. This will help reduce swelling. · Keep the skin clean and dry. · If you have a bandage, keep it clean and dry. · You may have a dressing over the cut (incision).  A dressing helps the incision heal and protects it. Your doctor will tell you how to take care of this. You can expect drainage from the wound. · If your doctor told you how to care for your incision, follow your doctor's instructions. If you did not get instructions, follow this general advice:  ? Wash around the incision with clean water 2 times a day. Don't use hydrogen peroxide or alcohol, which can slow healing. When should you call for help? Call your doctor now or seek immediate medical care if:    · You have signs that your infection is getting worse, such as:  ? Increased pain, swelling, warmth, or redness in the area. ? Red streaks leading from the area. ? Pus draining from the wound. ? A new or higher fever.    Watch closely for changes in your health, and be sure to contact your doctor if you have any problems. Where can you learn more? Go to http://pablo-cyndi.info/. Enter C340 in the search box to learn more about \"Infection After Surgery: Care Instructions. \"  Current as of: June 26, 2019  Content Version: 12.2  © 6070-9220 Healthwise, Incorporated. Care instructions adapted under license by [employer] (which disclaims liability or warranty for this information). If you have questions about a medical condition or this instruction, always ask your healthcare professional. [address] any warranty or liability for your use of this information.

## 2020-03-03 ENCOUNTER — OFFICE VISIT (OUTPATIENT)
Dept: CARDIOTHORACIC SURGERY | Age: 65
End: 2020-03-03

## 2020-03-03 VITALS
HEART RATE: 60 BPM | OXYGEN SATURATION: 98 % | BODY MASS INDEX: 35.29 KG/M2 | DIASTOLIC BLOOD PRESSURE: 84 MMHG | SYSTOLIC BLOOD PRESSURE: 147 MMHG | WEIGHT: 253 LBS

## 2020-03-03 DIAGNOSIS — Z51.89 VISIT FOR WOUND CHECK: Primary | ICD-10-CM

## 2020-03-03 NOTE — PATIENT INSTRUCTIONS
Monitor blood pressure. Follow up with cardiology and PCP as scheduled. No further follow up with our service needed.

## 2020-03-03 NOTE — PROGRESS NOTES
[facility] Progress Note      Today's date: 3/3/2020    Interval History: This is a follow-up wound check after antibiotics were started for a superficial infection of the subxiphoid incision. He had an emergent pericardial window and left pleural chest tube placement on 1/23/2020 by Dr. Brittany Mendiola. He was started on Keflex and has had no further problems with this incision. He completes the prescription in 1 day. No complaint it is no longer draining. He denies pain or erythema. Assessment:     Resolved superficial cellulitis of subxiphoid incision after pericardial window. Plan:     1. Discussed his hypertension. He will follow up with cardiology appointment next week. 2.  No further regular scheduled visits with our service needed. Subjective:     No complaint    Objective:     Admission Weight: Last Weight   Weight: 114.8 kg (253 lb) Weight: 114.8 kg (253 lb)     Visit Vitals  /84   Pulse 60   Wt 114.8 kg (253 lb)   SpO2 98%   BMI 35.29 kg/m²       Physical Exam:  General: Well-appearing. No apparent distress. Nontoxic-appearing. Neck: No JVD or tracheal deviation. Lungs: Clear to auscultation without wheezes, rales or rhonchi. Chest: Incisions well-healed. Clean and dry. No erythema. Heart: Regular rate and rhythm without murmur or rub. Abdomen: Obese. Active sounds. Soft and nontender. Dawna Jack PA-C    PLEASE NOTE:  This document has been produced using voice recognition software. Unrecognized errors in transcription may be present.

## 2020-03-10 NOTE — PROGRESS NOTES
CARDIAC SURGERY FOLLOW-UP NOTE    3/10/2020      Subjective:   Funmi Sal was sent to our clinic after being seen in Dr. Oralia Dixon office for concerns with his pericardial window incision. Assessment:  Pericardial window subxiphoid incision dehiscence and superficial infection. Plans / Recommendations:     Fill antibiotic prescription for Keflex  Call with any questions or concerns [phone]. Call for any sign or symptoms of infection. Follow-up in 1 week    Amy Rogers PA-C  [facility]  [phone]  _________________________________________________________________________________________________    Patient is seen in clinic this morning complaining of wound separation and drainage for 3 days. Denies fever, chills, nausea, vomiting. He had an emergent pericardial window and left pleural chest tube placement on 1/23/2020 by Dr. Filiberto Allen. Current medications include:  Current Outpatient Medications   Medication Sig Dispense Refill    colchicine 0.6 mg tablet Take 1 Tab by mouth daily. 90 Tab 0    aspirin 81 mg chewable tablet Take 1 Tab by mouth daily. 30 Tab 0    metoprolol tartrate (LOPRESSOR) 50 mg tablet Take 1 Tab by mouth two (2) times a day. 60 Tab 3    rosuvastatin (CRESTOR) 20 mg tablet Take 1 Tab by mouth nightly. 30 Tab 3    omega 3-dha-epa-fish oil 60- mg cap Take 500 mg by mouth.          Objective:   Vital signs:    BP Readings from Last 3 Encounters:   03/03/20 147/84   02/26/20 146/76   02/26/20 156/80     Wt Readings from Last 3 Encounters:   03/03/20 114.8 kg (253 lb)   02/26/20 113.9 kg (251 lb)   02/26/20 113.9 kg (251 lb)     @TMAX(24)@  Wt Readings from Last 3 Encounters:   03/03/20 114.8 kg (253 lb)   02/26/20 113.9 kg (251 lb)   02/26/20 113.9 kg (251 lb)     Ht Readings from Last 3 Encounters:   02/26/20 5' 11\" (1.803 m)   02/26/20 5' 11\" (1.803 m)   02/13/20 5' 11\" (1.803 m)     Respiratory exam: clear to auscultation bilaterally. Cardiac exam: regular rate and rhythm   Subxiphoid wound: 2 cm separation of incision, clear drainage.     Lili Jimenez PA-C [facility]  03/10/20, 1:03 PM

## 2020-04-30 ENCOUNTER — TELEPHONE (OUTPATIENT)
Dept: CARDIOLOGY CLINIC | Age: 65
End: 2020-04-30

## 2020-04-30 RX ORDER — ROSUVASTATIN CALCIUM 10 MG/1
10 TABLET, COATED ORAL
Qty: 30 TAB | Refills: 1 | Status: SHIPPED | OUTPATIENT
Start: 2020-04-30 | End: 2020-09-23

## 2020-04-30 NOTE — TELEPHONE ENCOUNTER
Spoke with patient and he is aware that he needs to decrease the Crestor and try CoQ 10. Will check with PCP to see if lft/lipid have or will be done if so will fax it. Please send new rx over for patient.

## 2020-04-30 NOTE — TELEPHONE ENCOUNTER
He can try decreasing to Crestor 10 mg and see if symptoms improve.   Also recommend OTC CoQ 10  Needs fasting lipid panel and LFT if not done with PCP

## 2020-04-30 NOTE — TELEPHONE ENCOUNTER
Patient is on Crestor 20 mg daily and patient states that this medication is causing muscle and joint pain. Patient stated that he has done Lipitor a long time ago he had to be switched from that also. Patient would like to know if there is something else that he can take that will not cause joint and muscle pain. Please Advise.

## 2020-05-03 DIAGNOSIS — I30.9 ACUTE PERICARDITIS, UNSPECIFIED TYPE: ICD-10-CM

## 2020-05-04 RX ORDER — COLCHICINE 0.6 MG/1
TABLET ORAL
Qty: 90 TAB | Refills: 0 | Status: SHIPPED | OUTPATIENT
Start: 2020-05-04 | End: 2020-10-26

## 2020-05-11 RX ORDER — ROSUVASTATIN CALCIUM 20 MG/1
TABLET, COATED ORAL
Qty: 30 TAB | Refills: 3 | Status: SHIPPED | OUTPATIENT
Start: 2020-05-11 | End: 2020-06-30 | Stop reason: CLARIF

## 2020-05-20 ENCOUNTER — VIRTUAL VISIT (OUTPATIENT)
Dept: CARDIOLOGY CLINIC | Age: 65
End: 2020-05-20

## 2020-05-20 VITALS
HEIGHT: 71 IN | DIASTOLIC BLOOD PRESSURE: 73 MMHG | HEART RATE: 59 BPM | BODY MASS INDEX: 35.7 KG/M2 | SYSTOLIC BLOOD PRESSURE: 126 MMHG | WEIGHT: 255 LBS

## 2020-05-20 DIAGNOSIS — I25.10 CAD S/P PERCUTANEOUS CORONARY ANGIOPLASTY: Primary | ICD-10-CM

## 2020-05-20 DIAGNOSIS — E78.5 DYSLIPIDEMIA: ICD-10-CM

## 2020-05-20 DIAGNOSIS — I48.0 PAROXYSMAL ATRIAL FIBRILLATION (HCC): ICD-10-CM

## 2020-05-20 DIAGNOSIS — Z98.890 STATUS POST CREATION OF PERICARDIAL WINDOW: ICD-10-CM

## 2020-05-20 DIAGNOSIS — G47.33 OSA (OBSTRUCTIVE SLEEP APNEA): ICD-10-CM

## 2020-05-20 DIAGNOSIS — Z98.61 CAD S/P PERCUTANEOUS CORONARY ANGIOPLASTY: Primary | ICD-10-CM

## 2020-05-20 RX ORDER — DIMENHYDRINATE 50 MG
100 TABLET ORAL DAILY
COMMUNITY

## 2020-05-20 NOTE — PATIENT INSTRUCTIONS
Learning About Coronary Artery Disease (CAD) What is coronary artery disease? Coronary artery disease (CAD) occurs when plaque builds up in the arteries that bring oxygen-rich blood to your heart. Plaque is a fatty substance made of cholesterol, calcium, and other substances in the blood. This process is called hardening of the arteries, or atherosclerosis. What happens when you have coronary artery disease? · Plaque may narrow the coronary arteries. Narrowed arteries cause poor blood flow. This can lead to angina symptoms such as chest pain or discomfort. If blood flow is completely blocked, you could have a heart attack. · You can slow CAD and reduce the risk of future problems by making changes in your lifestyle. These include quitting smoking and eating heart-healthy foods. · Treatments for CAD, along with changes in your lifestyle, can help you live a longer and healthier life. How can you prevent coronary artery disease? · Do not smoke. It may be the best thing you can do to prevent heart disease. If you need help quitting, talk to your doctor about stop-smoking programs and medicines. These can increase your chances of quitting for good. · Be active. Get at least 30 minutes of exercise on most days of the week. Walking is a good choice. You also may want to do other activities, such as running, swimming, cycling, or playing tennis or team sports. · Eat heart-healthy foods. Eat more fruits and vegetables and less foods that contain saturated and trans fats. Limit alcohol, sodium, and sweets. · Stay at a healthy weight. Lose weight if you need to. · Manage other health problems such as diabetes, high blood pressure, and high cholesterol. How is coronary artery disease treated? · Your doctor will suggest that you make lifestyle changes. For example, your doctor may ask you to eat healthy foods, quit smoking, lose extra weight, and be more active. · You will have to take medicines. · Your doctor may suggest a procedure to open narrowed or blocked arteries. This is called angioplasty. Or your doctor may suggest using healthy blood vessels to create detours around narrowed or blocked arteries. This is called bypass surgery. Follow-up care is a key part of your treatment and safety. Be sure to make and go to all appointments, and call your doctor if you are having problems. It's also a good idea to know your test results and keep a list of the medicines you take. Where can you learn more? Go to http://pablo-cyndi.info/ Enter (11) 4420 0608 in the search box to learn more about \"Learning About Coronary Artery Disease (CAD). \" Current as of: December 15, 2019Content Version: 12.4 © 4135-8801 Healthwise, Incorporated. Care instructions adapted under license by [employer] (which disclaims liability or warranty for this information). If you have questions about a medical condition or this instruction, always ask your healthcare professional. [address] any warranty or liability for your use of this information.

## 2020-05-20 NOTE — LETTER
Boone Abel 1955 5/20/2020 Dear MD Shadi Elise PA Tommy Einstein, MD 
 
I had the pleasure of evaluating  Mr. Delaney Ludwig in office today. Below are the relevant portions of my assessment and plan of care. ICD-10-CM ICD-9-CM 1. CAD S/P percutaneous coronary angioplasty I25.10 414.01   
 Z98.61 V45.82   
2. Paroxysmal atrial fibrillation (HCC) I48.0 427.31   
3. MELANI (obstructive sleep apnea) G47.33 327.23   
4. Status post creation of pericardial window Z98.890 V45.89   
5. Dyslipidemia E78.5 272.4 Current Outpatient Medications Medication Sig Dispense Refill  co-enzyme Q-10 (Co Q-10) 100 mg capsule Take 100 mg by mouth daily.  colchicine 0.6 mg tablet TAKE 1 TABLET BY MOUTH DAILY 90 Tab 0  
 rosuvastatin (CRESTOR) 10 mg tablet Take 1 Tab by mouth nightly. 30 Tab 1  
 aspirin 81 mg chewable tablet Take 1 Tab by mouth daily. 30 Tab 0  
 metoprolol tartrate (LOPRESSOR) 50 mg tablet Take 1 Tab by mouth two (2) times a day. 60 Tab 3  
 omega 3-dha-epa-fish oil 60- mg cap Take 500 mg by mouth.  rosuvastatin (CRESTOR) 20 mg tablet TAKE 1 TABLET BY MOUTH EVERY NIGHT AT BEDTIME 30 Tab 3 Orders Placed This Encounter  co-enzyme Q-10 (Co Q-10) 100 mg capsule Sig: Take 100 mg by mouth daily. If you have questions, please do not hesitate to call me. I look forward to following Mr. Delaney Ludwig along with you.  
 
Sincerely, 
Roberto Vargas MD

## 2020-05-20 NOTE — PROGRESS NOTES
Mary Grace Moyer is a 62 y.o. male who was seen by synchronous (real-time) audio-video technology on 5/20/2020. Consent:  He and/or his healthcare decision maker is aware that this patient-initiated Telehealth encounter is a billable service, with coverage as determined by his insurance carrier. He is aware that he may receive a bill and has provided verbal consent to proceed: Yes    712  Subjective:   Mary Grace Moyer was seen for Irregular Heart Beat (3 month)    Mr. Asa Leonard is a 70-year-old male with prior history of CAD status post PCI seen for follow-up. Had recent pericardial effusion followed by pericardial window. Course complicated by mild infection of the window access site. Which has resolved since last appointment. His effort tolerance is improved. No chest pain or shortness of breath. Family History   Problem Relation Age of Onset    Heart Attack Father     Heart Disease Neg Hx      Past Surgical History:   Procedure Laterality Date    HX CORONARY STENT PLACEMENT  2004     No Known Allergies    Current Outpatient Medications:     co-enzyme Q-10 (Co Q-10) 100 mg capsule, Take 100 mg by mouth daily. , Disp: , Rfl:     colchicine 0.6 mg tablet, TAKE 1 TABLET BY MOUTH DAILY, Disp: 90 Tab, Rfl: 0    rosuvastatin (CRESTOR) 10 mg tablet, Take 1 Tab by mouth nightly., Disp: 30 Tab, Rfl: 1    aspirin 81 mg chewable tablet, Take 1 Tab by mouth daily. , Disp: 30 Tab, Rfl: 0    metoprolol tartrate (LOPRESSOR) 50 mg tablet, Take 1 Tab by mouth two (2) times a day., Disp: 60 Tab, Rfl: 3    omega 3-dha-epa-fish oil 60- mg cap, Take 500 mg by mouth., Disp: , Rfl:     rosuvastatin (CRESTOR) 20 mg tablet, TAKE 1 TABLET BY MOUTH EVERY NIGHT AT BEDTIME, Disp: 30 Tab, Rfl: 3  No Known Allergies      Review of Systems   Review of Systems   Constitutional: Negative for chills and fever. HENT: Negative for nosebleeds. Eyes: Negative for blurred vision and double vision.    Respiratory: Negative for cough, hemoptysis, sputum production, shortness of breath and wheezing. Cardiovascular: Negative for chest pain, palpitations, orthopnea, claudication, leg swelling and PND. Gastrointestinal: Negative for abdominal pain, heartburn, nausea and vomiting. Musculoskeletal: Negative for myalgias. Skin: Negative for rash. Neurological: Negative for dizziness, weakness and headaches. Endo/Heme/Allergies: Does not bruise/bleed easily.    PHYSICAL EXAMINATION:    Vital Signs: (As obtained by patient/caregiver at home)  Visit Vitals  /73   Pulse (!) 59   Ht 5' 11\" (1.803 m)   Wt 115.7 kg (255 lb)   BMI 35.57 kg/m²        Constitutional: [x] Appears well-developed and well-nourished [x] No apparent distress      Mental status: [x] Alert and awake  [x] Oriented to person/place/time [x] Able to follow commands        Eyes:   EOM    [x]  Normal    [] Abnormal -   Sclera  [x]  Normal    [] Abnormal -          Discharge [x]  None visible      HENT: [x] Normocephalic, atraumatic  [] Abnormal -  [x] Mouth/Throat: Mucous membranes are moist    External Ears [x] Normal  [] Abnormal -    Neck: [x] No visualized mass,NO JVD [] Abnormal -     Pulmonary/Chest: [x] Respiratory effort normal   [x] No visualized signs of difficulty breathing or respiratory distress        [] Abnormal -     Musculoskeletal:   [x] Normal gait with no signs of ataxia         [x] Normal range of motion of neck        [] Abnormal -    Neurological:        [x] No Facial Asymmetry (Cranial nerve 7 motor function) (limited exam due to video visit)          [x] No gaze palsy        [] Abnormal -        Skin:        [x] No significant exanthematous lesions ,no bruising       [] Abnormal-           Psychiatric:       [x] Normal Affect [] Abnormal -       [x] No Hallucinations  Extremities:           No Edema    Other pertinent observable physical exam findings:-    Pertinent LAB and reports  I have reviewed available  pertinent notes, labs and reports and included in current evaluation of this patient. Assessment & Plan:   Diagnoses and all orders for this visit:    1. CAD S/P percutaneous coronary angioplasty    2. Paroxysmal atrial fibrillation (HCC)    3. MELANI (obstructive sleep apnea)    4. Status post creation of pericardial window    5. Dyslipidemia        Currently on optimal medical therapy. Discussed at length regarding need for oral anticoagulation due to history of proximal atrial fibrillation. Reports that he spoke to Dr. Erickson Cooper and he wants to continue aspirin only  Follow-up in 3 months      No orders of the defined types were placed in this encounter. We discussed the expected course, resolution and complications of the diagnosis(es) in detail. Medication risks, benefits, costs, interactions, and alternatives were discussed as indicated. I advised him to contact the office if his condition worsens, changes or fails to improve as anticipated. He expressed understanding with the diagnosis(es) and plan. I was at home while conducting this encounter. Pursuant to the emergency declaration under the [facility], [facility] waiver authority and the [employer] General Act, this Virtual  Visit was conducted, with patient's consent, to reduce the patient's risk of exposure to COVID-19 and provide continuity of care for an established patient. Services were provided through a video synchronous discussion virtually to substitute for in-person clinic visit.     Lani Elizabeth MD

## 2020-05-20 NOTE — PROGRESS NOTES
1. Have you been to the ER, urgent care clinic since your last visit? Hospitalized since your last visit?     no  2. Have you seen or consulted any other health care providers outside of the [address] [place] [address] since your last visit? Include any pap smears or colon screening. No     3. Since your last visit, have you had any of the following symptoms?  no

## 2020-06-30 ENCOUNTER — OFFICE VISIT (OUTPATIENT)
Dept: CARDIOLOGY CLINIC | Age: 65
End: 2020-06-30

## 2020-06-30 VITALS
DIASTOLIC BLOOD PRESSURE: 73 MMHG | WEIGHT: 258 LBS | SYSTOLIC BLOOD PRESSURE: 132 MMHG | TEMPERATURE: 98 F | HEART RATE: 64 BPM | BODY MASS INDEX: 36.12 KG/M2 | HEIGHT: 71 IN

## 2020-06-30 DIAGNOSIS — I48.0 PAROXYSMAL ATRIAL FIBRILLATION (HCC): ICD-10-CM

## 2020-06-30 DIAGNOSIS — E78.5 DYSLIPIDEMIA: ICD-10-CM

## 2020-06-30 DIAGNOSIS — I25.10 CAD S/P PERCUTANEOUS CORONARY ANGIOPLASTY: Primary | ICD-10-CM

## 2020-06-30 DIAGNOSIS — Z98.890 STATUS POST CREATION OF PERICARDIAL WINDOW: ICD-10-CM

## 2020-06-30 DIAGNOSIS — G47.33 OSA (OBSTRUCTIVE SLEEP APNEA): ICD-10-CM

## 2020-06-30 DIAGNOSIS — I31.4 PERICARDIAL EFFUSION WITH CARDIAC TAMPONADE: ICD-10-CM

## 2020-06-30 DIAGNOSIS — I31.39 PERICARDIAL EFFUSION WITH CARDIAC TAMPONADE: ICD-10-CM

## 2020-06-30 DIAGNOSIS — Z98.61 CAD S/P PERCUTANEOUS CORONARY ANGIOPLASTY: Primary | ICD-10-CM

## 2020-06-30 NOTE — PROGRESS NOTES
1. Have you been to the ER, urgent care clinic since your last visit? Hospitalized since your last visit? no    2. Have you seen or consulted any other health care providers outside of the [address] [place] [address] since your last visit? Include any pap smears or colon screening.   no

## 2020-06-30 NOTE — PROGRESS NOTES
HISTORY OF PRESENT ILLNESS  Jeffery Hernandez is a 62 y.o. male. 1/2020. Patient seen in office today with increasing shortness of breath started about a week ago which is progressive associated with orthopnea PND. Denies any chest pain but he is short of breath on minimal activity. He has noted palpitation. 1/27/2020  Mr. Elmer Vizcarra was recently admitted to [facility] for pericardial effusion  Pericardial fluid no malignant cell found. pericardial biopsy analysis showing fibrinous pericarditis. He experienced coughing spell Sunday and developed leaking at left chest tube site. He estimates about 6-8 oz of serous drainage. 6/2020  Complains of chest tightness after working outside on tractor Thurs and Fri last week. He also c/o SOB after taking shower. He c/o palpitations at that time. Last night was first time he felt symptoms improved. Cholesterol Problem   The history is provided by the patient. This is a chronic problem. The problem occurs constantly. The problem has not changed since onset. Associated symptoms include chest pain (chest tightness) and shortness of breath. Pertinent negatives include no abdominal pain and no headaches. Shortness of Breath   The history is provided by the patient. This is a new problem. The problem occurs frequently. The problem has been gradually worsening. Associated symptoms include chest pain (chest tightness). Pertinent negatives include no fever, no headaches, no cough, no sputum production, no hemoptysis, no wheezing, no PND, no orthopnea, no vomiting, no abdominal pain, no rash, no leg swelling and no claudication. Precipitated by: exertion. He has tried nothing for the symptoms. Associated medical issues include CAD. Associated medical issues do not include COPD or chronic lung disease. Swelling   Associated symptoms include chest pain (chest tightness) and shortness of breath. Pertinent negatives include no abdominal pain and no headaches.        Review of Systems   Constitutional: Positive for malaise/fatigue. Negative for chills and fever. HENT: Negative for nosebleeds. Eyes: Negative for blurred vision and double vision. Respiratory: Positive for shortness of breath. Negative for cough, hemoptysis, sputum production and wheezing. Cardiovascular: Positive for chest pain (chest tightness) and palpitations. Negative for orthopnea, claudication, leg swelling and PND. Gastrointestinal: Negative for abdominal pain, heartburn, nausea and vomiting. Musculoskeletal: Negative for myalgias. Skin: Negative for rash. Neurological: Negative for dizziness, weakness and headaches. Endo/Heme/Allergies: Does not bruise/bleed easily.      Family History   Problem Relation Age of Onset    Heart Attack Father     Heart Disease Neg Hx        Past Medical History:   Diagnosis Date    Atrial fibrillation ([facility] [address] 01/07/2020    Coronary artery disease     Coronary atherosclerosis of native coronary artery     Stable, no recent angina    ED (erectile dysfunction)     Elevated blood pressure reading without diagnosis of hypertension     Monitor     History of hepatitis C     treated with interferon    Obstructive sleep apnea 1/20/2020    MELANI on CPAP     Other and unspecified hyperlipidemia     Paroxysmal atrial fibrillation ([facility] [address] 1/8/2020    Platelet inhibition due to Plavix     on Eliquis    Postsurgical percutaneous transluminal coronary angioplasty status     Eight years since stent, no recent stent testing, EKG normal       Past Surgical History:   Procedure Laterality Date    HX CORONARY STENT PLACEMENT  2004       Social History     Tobacco Use    Smoking status: Former Smoker     Last attempt to quit: 2010     Years since quitting: 10.5    Smokeless tobacco: Former User    Tobacco comment: Remotely quit tobacco use 2010   Substance Use Topics    Alcohol use: Yes     Comment: Occasional       No Known Allergies    Prior to Admission medications Medication Sig Start Date End Date Taking? Authorizing Provider   apixaban (ELIQUIS) 5 mg tablet Take 1 Tab by mouth two (2) times a day. 6/30/20  Yes Brannon Boyer NP   co-enzyme Q-10 (Co Q-10) 100 mg capsule Take 100 mg by mouth daily. Yes Provider, Historical   colchicine 0.6 mg tablet TAKE 1 TABLET BY MOUTH DAILY 5/4/20  Yes Brannon Boyer NP   rosuvastatin (CRESTOR) 10 mg tablet Take 1 Tab by mouth nightly. 4/30/20  Yes Brannon Boyer NP   aspirin 81 mg chewable tablet Take 1 Tab by mouth daily. 1/24/20  Yes Anup Go MD   metoprolol tartrate (LOPRESSOR) 50 mg tablet Take 1 Tab by mouth two (2) times a day. 1/7/20  Yes Barry Santamaria MD   omega 8-bti-skd-fish oil 60- mg cap Take 500 mg by mouth. Yes Provider, Historical   rosuvastatin (CRESTOR) 20 mg tablet TAKE 1 TABLET BY MOUTH EVERY NIGHT AT BEDTIME 5/11/20 6/30/20  Barry Santamaria MD         Visit Vitals  /73   Pulse 64   Temp 98 °F (36.7 °C)   Ht 5' 11\" (1.803 m)   Wt 117 kg (258 lb)   BMI 35.98 kg/m²         Physical Exam   Constitutional: He is oriented to person, place, and time. He appears well-developed and well-nourished. HENT:   Head: Normocephalic and atraumatic. Eyes: Conjunctivae are normal.   Neck: Neck supple. No JVD present. No tracheal deviation present. No thyromegaly present. Cardiovascular: Normal rate and normal heart sounds. An irregular rhythm present. Exam reveals no gallop and no friction rub. No murmur heard. Pulmonary/Chest: Breath sounds normal. No respiratory distress. He has no wheezes. He has no rales. He exhibits no tenderness. Serous drainage from left chest tube site,  Slight erythema, no warmth to site   Abdominal: Soft. There is no abdominal tenderness. Musculoskeletal:         General: Swelling present. No edema. Neurological: He is alert and oriented to person, place, and time. Skin: Skin is warm and dry. Psychiatric: He has a normal mood and affect.    Nursing note and vitals reviewed. Mr. Lisa Licona has a reminder for a \"due or due soon\" health maintenance. I have asked that he contact his primary care provider for follow-up on this health maintenance. SUMMARY:echo-2017  Left ventricle: Systolic function was normal. Ejection fraction was  estimated in the range of 55 % to 60 %. There were no regional wall motion  abnormalities. There was moderate concentric hypertrophy. COMPARISONS:  Comparison was made with the previous study of 10-Santhosh-2013. LV thickness  has increased. NUCLEAR IMAGIN2017     Findings:   1. Stress images reveal normal Myoview distrubution in all the LV segments in short axis, vertical and horizontal long axis views. 2. Resting images have a normal uptake. 3. Gated images reveal normal wall motion and the ejection fraction is calculated to be 74%. Conclusion:   1. Normal perfusion scan. 2. Normal wall motion and ejection fraction. 3. Low risk scan.    2020 - Echo  Interpretation Summary   · Normal cavity size and systolic function (ejection fraction normal). Mild concentric hypertrophy. Estimated left ventricular ejection fraction is 55 - 60%. No regional wall motion abnormality noted. Moderate (grade 2) left ventricular diastolic dysfunction. · No hemodynamically significant valvular pathology. · Severely elevated central venous pressure (15+ mmHg); IVC diameter is larger than 21 mm and collapses less than 50% with respiration. · Large circumferential pericardial effusion. Hemodynamic compromise consistent with cardiac tamponade. Comparison Study Information   Prior Study   When compared to the previous study from 17, the large pericardial effusion with evidence of cardiac tamponade is a new finding. 2020 Echo  · Normal cavity size and systolic function (ejection fraction normal). Mildly to moderately increased wall thickness. Estimated left ventricular ejection fraction is 55 - 60%.  Visually measured ejection fraction. No regional wall motion abnormality noted. · Trivial pericardial effusion adjacent to right ventricle and right atrium. Comparison Study Information     Prior Study     There is a prior study available for comparison. Prior study date: 1/20/2020. As compared to the previous study, there are significant changes. Pericardial effusion has decreased to a trace amount. 1/2020   PERICARDIAL TISSUE:   FIBRINOUS PERICARDITIS WITH ORGANIZATION AND FIBROSIS. Assessment         ICD-10-CM ICD-9-CM    1. CAD S/P percutaneous coronary angioplasty I25.10 414.01     Z98.61 V45.82     Stable, continue current medications   2. Paroxysmal atrial fibrillation (HCC) I48.0 427.31 ECHO ADULT FOLLOW-UP OR LIMITED      CARDIAC EVENT MONITOR    Takes aspirin daily   3. MELANI (obstructive sleep apnea) G47.33 327.23     Improved with CPAP   4. Status post creation of pericardial window Z98.890 V45.89    5. Dyslipidemia E78.5 272.4    6. Pericardial effusion with cardiac tamponade I31.3 423.9 apixaban (ELIQUIS) 5 mg tablet    I31.4 423.3     Pericardial fluid no malignant cell found. pericardial biopsy analysis showing fibrinous pericarditis. 1/2020  Recent admission to [facility] for pericarditis while taking Eliquis. He developed pericardial effusion with cardiac tamponade. He is s/p pericardial window with chest tube. Pericardial fluid was negative for malignant cells, biopsy revealed fibrinous pericarditis. He has continued to drain serous fluid from chest tube site. Continue dressings to site. Will continue to hold eliquis. Treat pericarditis with Colchicine and Naprosyn x 3 months. Underlying etiology of pericarditis undetermined. Check GEETHA, ESR, and Rhematoid factor    6/2020  Complains of chest tightness with SOB and palpitations after working on tractor in [employer] and Fri. Symptoms resolved spontaneously last night. Suspect was episode of paroxysmal afib.   Will obtain limited echo to re-eval pericardial effusion (none seen 2/2020). Resume AC with Eliquis and obtain event monitor. Medications Discontinued During This Encounter   Medication Reason    rosuvastatin (CRESTOR) 20 mg tablet Formulary Change       Orders Placed This Encounter    ECHO ADULT FOLLOW-UP OR LIMITED     Standing Status:   Future     Standing Expiration Date:   12/30/2020     Order Specific Question:   Contrast Enhancement (Bubble Study, Definity, Optison) may be used if criteria listed in established evidence-based protocol has been identified. Answer:   Yes     Order Specific Question:   Enhanced Imaging (Myocardial Strain, 3D post-processing) may be used if criteria listed in established evidence-based protocol has been identified. Answer: Yes    apixaban (ELIQUIS) 5 mg tablet     Sig: Take 1 Tab by mouth two (2) times a day. Dispense:  60 Tab     Refill:  5       Follow-up and Dispositions    · Return in about 6 weeks (around 8/11/2020), or if symptoms worsen or fail to improve. I have independently evaluated taken history and examined the patient. All relevant labs and testing data's are reviewed. Care plan discussed and updated after review.   Alla Jimenez MD

## 2020-07-23 ENCOUNTER — TELEPHONE (OUTPATIENT)
Dept: CARDIOLOGY CLINIC | Age: 65
End: 2020-07-23

## 2020-07-23 NOTE — TELEPHONE ENCOUNTER
Called and spoke to patient wife regarding advise per Dr. Rosetta Subramanian, No significant effusion today. Not related to chest wall. She  voices understanding and acceptance of this advice and will call back if any further questions or concerns.

## 2020-07-23 NOTE — TELEPHONE ENCOUNTER
Patient was in office for Echo today and wanted to know from you, he's last pericardial effusion was FEB 2020 and he states right eubanks is always swollen than the left and wants to know if the fluid from right eubanks is causing him to have the fluid in chest. Please advise

## 2020-08-04 ENCOUNTER — OFFICE VISIT (OUTPATIENT)
Dept: CARDIOLOGY CLINIC | Age: 65
End: 2020-08-04

## 2020-08-04 VITALS
BODY MASS INDEX: 37.8 KG/M2 | WEIGHT: 264 LBS | HEART RATE: 72 BPM | DIASTOLIC BLOOD PRESSURE: 77 MMHG | TEMPERATURE: 98.4 F | OXYGEN SATURATION: 92 % | SYSTOLIC BLOOD PRESSURE: 149 MMHG | HEIGHT: 70 IN

## 2020-08-04 DIAGNOSIS — R06.02 SOBOE (SHORTNESS OF BREATH ON EXERTION): ICD-10-CM

## 2020-08-04 DIAGNOSIS — I25.10 CAD S/P PERCUTANEOUS CORONARY ANGIOPLASTY: ICD-10-CM

## 2020-08-04 DIAGNOSIS — I31.39 PERICARDIAL EFFUSION WITH CARDIAC TAMPONADE: ICD-10-CM

## 2020-08-04 DIAGNOSIS — I48.0 PAROXYSMAL ATRIAL FIBRILLATION (HCC): Primary | ICD-10-CM

## 2020-08-04 DIAGNOSIS — Z98.61 CAD S/P PERCUTANEOUS CORONARY ANGIOPLASTY: ICD-10-CM

## 2020-08-04 DIAGNOSIS — I31.4 PERICARDIAL EFFUSION WITH CARDIAC TAMPONADE: ICD-10-CM

## 2020-08-04 DIAGNOSIS — G47.33 OSA (OBSTRUCTIVE SLEEP APNEA): ICD-10-CM

## 2020-08-04 NOTE — PROGRESS NOTES
HISTORY OF PRESENT ILLNESS  Kimberly Ennis is a 62 y.o. male. 1/2020. Patient seen in office today with increasing shortness of breath started about a week ago which is progressive associated with orthopnea PND. Denies any chest pain but he is short of breath on minimal activity. He has noted palpitation. Cholesterol Problem   The history is provided by the patient. This is a chronic problem. The problem occurs constantly. The problem has not changed since onset. Associated symptoms include shortness of breath. Pertinent negatives include no chest pain, no abdominal pain and no headaches. Shortness of Breath   The history is provided by the patient. This is a new problem. The problem occurs frequently. The problem has been gradually worsening. Pertinent negatives include no fever, no headaches, no cough, no sputum production, no hemoptysis, no wheezing, no PND, no orthopnea, no chest pain, no vomiting, no abdominal pain, no rash, no leg swelling and no claudication. Precipitated by: exertion. He has tried nothing for the symptoms. Associated medical issues include CAD. Associated medical issues do not include COPD or chronic lung disease. Review of Systems   Constitutional: Negative for chills and fever. HENT: Negative for nosebleeds. Eyes: Negative for blurred vision and double vision. Respiratory: Positive for shortness of breath. Negative for cough, hemoptysis, sputum production and wheezing. Cardiovascular: Positive for palpitations. Negative for chest pain, orthopnea, claudication, leg swelling and PND. Gastrointestinal: Negative for abdominal pain, heartburn, nausea and vomiting. Musculoskeletal: Negative for myalgias. Skin: Negative for rash. Neurological: Negative for dizziness, weakness and headaches. Endo/Heme/Allergies: Does not bruise/bleed easily.      Family History   Problem Relation Age of Onset    Heart Attack Father     Heart Disease Neg Hx        Past Medical History:   Diagnosis Date    Atrial fibrillation ([facility] 01/07/2020    Coronary artery disease     Coronary atherosclerosis of native coronary artery     Stable, no recent angina    ED (erectile dysfunction)     Elevated blood pressure reading without diagnosis of hypertension     Monitor     History of hepatitis C     treated with interferon    Obstructive sleep apnea 1/20/2020    MELANI on CPAP     Other and unspecified hyperlipidemia     Paroxysmal atrial fibrillation ([facility] 1/8/2020    Platelet inhibition due to Plavix     on Eliquis    Postsurgical percutaneous transluminal coronary angioplasty status     Eight years since stent, no recent stent testing, EKG normal       Past Surgical History:   Procedure Laterality Date    HX CORONARY STENT PLACEMENT  2004       Social History     Tobacco Use    Smoking status: Former Smoker     Last attempt to quit: 2010     Years since quitting: 10.5    Smokeless tobacco: Former User    Tobacco comment: Remotely quit tobacco use 2010   Substance Use Topics    Alcohol use: Yes     Comment: Occasional       No Known Allergies    Prior to Admission medications    Medication Sig Start Date End Date Taking? Authorizing Provider   apixaban (ELIQUIS) 5 mg tablet Take 1 Tab by mouth two (2) times a day. 8/4/20  Yes Camryn Hanson MD   co-enzyme Q-10 (Co Q-10) 100 mg capsule Take 100 mg by mouth daily. Yes Provider, Historical   colchicine 0.6 mg tablet TAKE 1 TABLET BY MOUTH DAILY 5/4/20  Yes Brannon Boyer NP   rosuvastatin (CRESTOR) 10 mg tablet Take 1 Tab by mouth nightly. 4/30/20  Yes Brannon Boyer NP   aspirin 81 mg chewable tablet Take 1 Tab by mouth daily. 1/24/20  Yes Carey Marroquin MD   metoprolol tartrate (LOPRESSOR) 50 mg tablet Take 1 Tab by mouth two (2) times a day. 1/7/20  Yes Camryn Hanson MD   omega 4-wei-mfp-fish oil 60- mg cap Take 500 mg by mouth.    Yes Provider, Historical         Visit Vitals  /77 (BP 1 Location: Left arm, BP Patient Position: Sitting)   Pulse 72   Temp 98.4 °F (36.9 °C)   Ht 5' 10\" (1.778 m)   Wt 119.7 kg (264 lb)   SpO2 92%   BMI 37.88 kg/m²         Physical Exam   Constitutional: He is oriented to person, place, and time. He appears well-developed and well-nourished. HENT:   Head: Normocephalic and atraumatic. Eyes: Conjunctivae are normal.   Neck: Neck supple. No JVD present. No tracheal deviation present. No thyromegaly present. Cardiovascular: Normal rate and normal heart sounds. An irregular rhythm present. Exam reveals no gallop and no friction rub. No murmur heard. Pulmonary/Chest: Breath sounds normal. No respiratory distress. He has no wheezes. He has no rales. He exhibits no tenderness. Abdominal: Soft. There is no abdominal tenderness. Musculoskeletal:         General: No edema. Neurological: He is alert and oriented to person, place, and time. Skin: Skin is warm and dry. Psychiatric: He has a normal mood and affect. Mr. Luna Storm has a reminder for a \"due or due soon\" health maintenance. I have asked that he contact his primary care provider for follow-up on this health maintenance. SUMMARY:echo-2017  Left ventricle: Systolic function was normal. Ejection fraction was  estimated in the range of 55 % to 60 %. There were no regional wall motion  abnormalities. There was moderate concentric hypertrophy. COMPARISONS:  Comparison was made with the previous study of 10-Santhosh-2013. LV thickness  has increased. NUCLEAR IMAGIN2017     Findings:   1. Stress images reveal normal Myoview distrubution in all the LV segments in short axis, vertical and horizontal long axis views. 2. Resting images have a normal uptake. 3. Gated images reveal normal wall motion and the ejection fraction is calculated to be 74%. Conclusion:   1. Normal perfusion scan. 2. Normal wall motion and ejection fraction. 3. Low risk scan.     Procedure Conclusion 2020    Nuclear Stress Test     Nuclear Cardiac Spect Rest then Gated Stress study. Linda Hence was used as the stressing method and agent. One day myocardial perfusion study. Date: 1/14/2020. Negative myocardial perfusion imaging. Myocardial perfusion imaging supports a low risk stress test.   There is a prior study available for comparison. .     2/2020  Event monitor frequent paroxysmal atrial fibrillation flutter      Interpretation Summary 7/2020    · LV: Normal cavity size and systolic function (ejection fraction normal). Mild concentric hypertrophy. Estimated left ventricular ejection fraction is 55 - 60%. Wall motion: normal. Moderate (grade 2) left ventricular diastolic dysfunction. · RV: Mildly dilated right ventricle. · RA: Mildly dilated right atrium. · MV: Mitral valve thickening. Mitral annular calcification. · Pericardium: Pericardial fat pad present. No evidence of pericardial effusion. Assessment         ICD-10-CM ICD-9-CM    1. Paroxysmal atrial fibrillation (HCC)  I48.0 427.31     Stable monitor continue treatment   2. CAD S/P percutaneous coronary angioplasty  I25.10 414.01     Z98.61 V45.82     Stable. Negative nuclear stress test   3. MELANI (obstructive sleep apnea)  G47.33 327.23 REFERRAL TO PULMONARY DISEASE    Continue CPAP   4. SOBOE (shortness of breath on exertion)  R06.02 786.05 REFERRAL TO PULMONARY DISEASE    No significant cardiac etiology noted no pericardial effusion. Normal ejection fraction enlarged right side likely related to MELANI. 5. Pericardial effusion with cardiac tamponade  I31.3 423.9 apixaban (ELIQUIS) 5 mg tablet    I31.4 423.3     Pericardial fluid no malignant cell found. pericardial biopsy analysis showing fibrinous pericarditis. 7/2020  Shortness of breath on exertion unclear etiology history of paroxysmal A. fib. Restarted Eliquis. Negative stress test normal ejection fraction enlarged right-sided chambers on echo.   Evaluate with pulmonary for sleep apnea as well as shortness of breath. Medications Discontinued During This Encounter   Medication Reason    apixaban (ELIQUIS) 5 mg tablet Non-Compliance       Orders Placed This Encounter    REFERRAL TO PULMONARY DISEASE     Referral Priority:   Routine     Referral Type:   Consultation     Referral Reason:   Specialty Services Required     Referred to Provider:   Nabeel Thomas MD    apixaban (ELIQUIS) 5 mg tablet     Sig: Take 1 Tab by mouth two (2) times a day. Dispense:  60 Tab     Refill:  5       Follow-up and Dispositions    · Return in about 4 months (around 12/4/2020).

## 2020-08-10 DIAGNOSIS — I48.0 PAROXYSMAL ATRIAL FIBRILLATION (HCC): ICD-10-CM

## 2020-09-23 RX ORDER — ROSUVASTATIN CALCIUM 10 MG/1
TABLET, COATED ORAL
Qty: 30 TAB | Refills: 1 | Status: SHIPPED | OUTPATIENT
Start: 2020-09-23 | End: 2021-01-11 | Stop reason: SDUPTHER

## 2020-10-07 RX ORDER — METOPROLOL TARTRATE 50 MG/1
TABLET ORAL
Qty: 60 TAB | Refills: 3 | Status: SHIPPED | OUTPATIENT
Start: 2020-10-07 | End: 2021-09-24 | Stop reason: SINTOL

## 2020-10-25 DIAGNOSIS — I30.9 ACUTE PERICARDITIS, UNSPECIFIED TYPE: ICD-10-CM

## 2020-10-26 RX ORDER — COLCHICINE 0.6 MG/1
TABLET ORAL
Qty: 90 TAB | Refills: 0 | Status: SHIPPED | OUTPATIENT
Start: 2020-10-26 | End: 2021-04-08

## 2020-12-09 ENCOUNTER — OFFICE VISIT (OUTPATIENT)
Dept: PULMONOLOGY | Age: 65
End: 2020-12-09
Payer: COMMERCIAL

## 2020-12-09 ENCOUNTER — HOSPITAL ENCOUNTER (OUTPATIENT)
Dept: LAB | Age: 65
Discharge: HOME | End: 2020-12-09

## 2020-12-09 VITALS
OXYGEN SATURATION: 98 % | WEIGHT: 266.8 LBS | HEART RATE: 61 BPM | HEIGHT: 71 IN | BODY MASS INDEX: 37.35 KG/M2 | TEMPERATURE: 97.9 F | SYSTOLIC BLOOD PRESSURE: 144 MMHG | RESPIRATION RATE: 18 BRPM | DIASTOLIC BLOOD PRESSURE: 77 MMHG

## 2020-12-09 DIAGNOSIS — R06.02 SOB (SHORTNESS OF BREATH) ON EXERTION: Primary | ICD-10-CM

## 2020-12-09 DIAGNOSIS — I48.0 PAROXYSMAL ATRIAL FIBRILLATION (HCC): Chronic | ICD-10-CM

## 2020-12-09 DIAGNOSIS — J90 PLEURAL EFFUSION, LEFT: ICD-10-CM

## 2020-12-09 DIAGNOSIS — G47.33 OBSTRUCTIVE SLEEP APNEA: Chronic | ICD-10-CM

## 2020-12-09 DIAGNOSIS — E66.01 SEVERE OBESITY (HCC): ICD-10-CM

## 2020-12-09 DIAGNOSIS — Z01.812 PRE-PROCEDURE LAB EXAM: ICD-10-CM

## 2020-12-09 LAB — XX-LABCORP SPECIMEN COL,LCBCF: NORMAL

## 2020-12-09 PROCEDURE — 99001 SPECIMEN HANDLING PT-LAB: CPT

## 2020-12-09 PROCEDURE — 94729 DIFFUSING CAPACITY: CPT | Performed by: INTERNAL MEDICINE

## 2020-12-09 PROCEDURE — 94060 EVALUATION OF WHEEZING: CPT | Performed by: INTERNAL MEDICINE

## 2020-12-09 PROCEDURE — 99204 OFFICE O/P NEW MOD 45 MIN: CPT | Performed by: INTERNAL MEDICINE

## 2020-12-09 PROCEDURE — 94727 GAS DIL/WSHOT DETER LNG VOL: CPT | Performed by: INTERNAL MEDICINE

## 2020-12-09 PROCEDURE — 94618 PULMONARY STRESS TESTING: CPT | Performed by: INTERNAL MEDICINE

## 2020-12-09 NOTE — PROGRESS NOTES
[facility]  Pulmonary, Critical Care, and Sleep Medicine      Pulmonary Office Initial Consultation    Name: Camilo Hope     : 1955     Date: 2020        Subjective:   Patient has been referred for evaluation of: SOB on exertion. Patient is a 60 y.o. male is here for chief complaints of shortness of breath on exertion. Patient states that earlier in the year in January he was experiencing progressive symptoms of shortness of breath and was diagnosed with a pericardial effusion with near tamponade. He underwent a pericardial window with drainage of the effusion at [facility] and subsequently has not had any recurrence of the pericardial effusion. He mentions that the symptoms of shortness of breath seem to improve after the drainage however over a period of time he still has residual symptoms and does not feel back to his previous state. He does describe symptoms as predominantly occurring at rest-especially when he is talking or he gets excited. He mentions that he is able to exercise on a treadmill lift weights 4 times a week workout 4 times a week and swim half a mile once a week without any dyspnea. He denies any associated symptoms of cough or wheezing. He has been diagnosed with sleep apnea in  and was prescribed a CPAP machine which she has been using regularly. He has never followed up with a sleep physician and has the same machine that he got 11 years back. I have download from his last 90 days use-excellent compliance with 90 out of 90 days usage for more than 8 hours at a CPAP pressure of 12 cm and a resultant AHI of 0.7. Patient denies any swelling of the lower extremities  He states that he was on Eliquis for atrial fibrillation but stopped it due to cost.  Currently takes aspirin    Patient is an ex-smoker quit smoking 10 years back.   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    ILD history:  No Hx of connective tissue disease such as RA, Lupus, Scleroderma  No Hx Raynauds  No Hx sarcoidosis  No Hx taking medications- methotrexate, Amiodarone, Nitrofurantoin  No Hx cancer, chemotherapy, radiation  Hx Birds, chickens, farm animals  No Hx using hair spray  No Hx asbestos exposure, coal mining, textile industry work, construction work  Hx smoking  No Hx TB/positive PPD       Review of data:  Testing:  CXR  CTA chest-2/13/2020: Question of filling defect in the right lower lobe subsegment, small left pleural effusion, findings of cirrhosis and portal hypertension  PFT-not available  Echo-7/20/2020: EF 55 to 60%, dilated RV RA, no pulmonary hypertension-PA P 29 mmHg    Sleep studies-not available    Serologies-were ordered but canceled  Vaccinations:  Pneumococcal  Influenza    DME:    PAP device-CPAP    Past Medical History:   Diagnosis Date    Atrial fibrillation ([address] 01/07/2020    Coronary artery disease     Coronary atherosclerosis of native coronary artery     Stable, no recent angina    ED (erectile dysfunction)     Elevated blood pressure reading without diagnosis of hypertension     Monitor     History of hepatitis C     treated with interferon    Obstructive sleep apnea 1/20/2020    MELANI on CPAP     Other and unspecified hyperlipidemia     Paroxysmal atrial fibrillation ([address] 1/8/2020    Platelet inhibition due to Plavix     on Eliquis    Postsurgical percutaneous transluminal coronary angioplasty status     Eight years since stent, no recent stent testing, EKG normal       Past Surgical History:   Procedure Laterality Date    HX CORONARY STENT PLACEMENT 2004    HX HEART VALVE SURGERY         Social History     Socioeconomic History    Marital status:      Spouse name: Not on file    Number of children: Not on file    Years of education: Not on file    Highest education level: Not on file   Tobacco Use    Smoking status: Former Smoker     Last attempt to quit: 2010     Years since quitting: 10.9    Smokeless tobacco: Former User    Tobacco comment: Remotely quit tobacco use 2010   Substance and Sexual Activity    Alcohol use: Yes     Comment: Occasional    Drug use: Never       Family History   Problem Relation Age of Onset    Heart Disease Father     Cancer Mother        No Known Allergies    . Current Outpatient Medications   Medication Sig Dispense Refill    colchicine 0.6 mg tablet TAKE 1 TABLET BY MOUTH DAILY 90 Tab 0    metoprolol tartrate (LOPRESSOR) 50 mg tablet TAKE 1 TABLET BY MOUTH TWICE DAILY 60 Tab 3    rosuvastatin (CRESTOR) 10 mg tablet TAKE 1 TABLET BY MOUTH EVERY NIGHT 30 Tab 1    aspirin 81 mg chewable tablet Take 1 Tab by mouth daily. 30 Tab 0    apixaban (ELIQUIS) 5 mg tablet Take 1 Tab by mouth two (2) times a day. 60 Tab 5    co-enzyme Q-10 (Co Q-10) 100 mg capsule Take 100 mg by mouth daily.  omega 3-dha-epa-fish oil 60- mg cap Take 500 mg by mouth.        Review of Systems:  HEENT: No epistaxis, no nasal drainage, no difficulty in swallowing, no redness in eyes  Respiratory: as above  Cardiovascular: no chest pain, no palpitations, no chronic leg edema, no syncope  Gastrointestinal: no abd pain, no vomiting, no diarrhea, no bleeding symptoms  Genitourinary: No urinary symptoms or hematuria  Integument/breast: No ulcers or rashes  Musculoskeletal:Neg  Neurological: No focal weakness, no seizures, no headaches  Behvioral/Psych: No anxiety, no depression  Constitutional: No fever, no chills, no weight loss, no night sweats     Objective:     Visit Vitals  BP (!) 144/77 (BP 1 Location: Right arm, BP Patient Position: Sitting)   Pulse 61   Temp 97.9 °F (36.6 °C) (Oral)   Resp 18   Ht 5' 11\" (1.803 m)   Wt 121 kg (266 lb 12.8 oz)   SpO2 98% Comment: RA Rest   BMI 37.21 kg/m²        Physical Exam:   General: comfortable, no acute distress  HEENT: pupils reactive, sclera anicteric, EOM intact  Neck: No adenopathy or thyroid swelling, no lymphadenopathy or JVD, supple  CVS: S1S2 no murmurs  RS: Mod AE bilaterally, no tactile fremitus or egophony, no accessory muscle use  Abd: soft, non tender, no hepatosplenomegaly  Neuro: non focal, awake, alert  Extrm: no leg edema, clubbing or cyanosis  Skin: no rash    Data review:   Pertinent labs: CBC, BMP, LFT's      PFT:    Date FVC FEV1  FEV1/FVC ITS99-79 TLC RV RV/TLC VC DLCO                                                     6 min walk test; completed in clinic today walked a distance of 437 m without oxygen desaturation or change in dyspnea index      Results for orders placed or performed during the hospital encounter of 02/13/20   EKG, 12 LEAD, INITIAL   Result Value Ref Range    Ventricular Rate 61 BPM    Atrial Rate 61 BPM    P-R Interval 164 ms    QRS Duration 88 ms    Q-T Interval 414 ms    QTC Calculation (Bezet) 416 ms    Calculated P Axis 65 degrees    Calculated R Axis 55 degrees    Calculated T Axis 42 degrees    Diagnosis       Normal sinus rhythm  Normal ECG  When compared with ECG of 20-JAN-2020 11:00,  Vent. rate has decreased BY  39 BPM  Nonspecific T wave abnormality no longer evident in Lateral leads  Confirmed by Padmini Mejia MD, ----- ([id number]) on 2/13/2020 4:09:04 PM         Imaging:  I have personally reviewed the patients radiographs and have reviewed the reports:  XR Results (most recent):  Results from Hospital Encounter encounter on 02/13/20   XR CHEST PA LAT    Narrative HISTORY: Shortness of breath. EXAM: Chest.    TECHNIQUE: Two views of the chest were obtained.      COMPARISON: 1/30/2020    FINDINGS: Unchanged aeration of bilateral hemithoraces is demonstrated with  minimal bilateral diffuse interstitial prominence without pneumothorax pneumonia  or pleural effusions. Heart and mediastinal structures are unchanged. Heart is  enlarged. . Visualized bony thorax and soft tissues are within normal limits. Unchanged left upper lobe pulmonary nodule. Impression  IMPRESSION:    1. Unchanged sequela of minimal congestion. CT Results (most recent):  Results from Hospital Encounter encounter on 02/13/20   CTA CHEST W OR W WO CONT    Narrative EXAM:  CTA Chest with Contrast         CLINICAL INDICATION:  Shortness of breath. Low-grade fever. Pericardial  effusion on 01/20/20. COMPARISON:  None. TECHNIQUE:      - Helical volumetric sections of the chest are obtained with CT pulmonary  angiogram protocol. Subsequently, sagittal and coronal multiplanar  reconstruction images are obtained. Maximum intensity projection images are  generated to better delineate the pulmonary vasculature, differentiate between  the pulmonary arteries and veins and to increase sensitivity to pulmonary  emboli.    - IV contrast 100 mL Isovue-370.  - Radiation dose optimization techniques are utilized as appropriate to the  exam, with combination of automated exposure control, adjustment of the mA  and/or kV according to patient's size (Including appropriate matching for  site-specific examinations), or use of iterative reconstruction technique. FINDINGS:     Pulmonary Arteries:  Subtle questionable filling defects are suggested in the  right lower lobe posterior basal segmental artery subsegmental branch (axial  #149), with additional irregular questionable filling defects in more distal  branches. Of note, the right lower lobe are pulmonary venous branch appears to  show a similar questionable filling defect (axial #147). Questionable irregular  appearance of the left lower lobar subsegmental pulmonary artery branches are  also identified.   These may be artifactual in nature, perhaps due to motion  artifact or pulsation artifact producing this unusual pattern. These findings  are doubtful for PE. Most likely attributable to volume averaging artifact  instead of no additional filling defects are identified elsewhere in both lungs. PE.    Lung, Pleura, Airways:  Small left pleural effusion. No significant right-sided  pleural effusion. No dominant lung mass without discrete suspicious  noncalcified lung nodule is detected. Calcified granuloma in the left upper  lobe (axial #67). No airspace opacities. Mediastinum, Jazmyn:  No convincing evidence for adenopathy. Cardiovascular:  No evidence of aortic dissection or aneurysm. Small  pericardial effusion. Chest Wall:  There is a focal fluid collection and possible scar tissue along  the inferior aspect of the xiphoid process (axial #184), measuring about 5.2 cm  AP dimension and about 2.3 cm in width. Bilateral gynecomastia. Base of Neck:  No acute findings. Axillae:  Unremarkable. Abdomen:  No acute abnormalities. There is recanalization of the umbilical  vein. Suggestive of portal hypertension which in turn suggests underlying  cirrhosis. Skeletal Structures:  No acute findings. Impression IMPRESSION:         1. Possible \"filling defect\" in the right lower lobe subsegmental branch  artery. Not convincing for PE. No definite corresponding abnormality on the  coronal and sagittal reconstruction images. Suspect volume averaging artifact. No convincing evidence of pulmonary embolism elsewhere. 2.  Small left pleural effusion. 3.  Questionable soft tissue density in the subxiphoid region. Query recent  pericardial window or other types of drainage procedure with residual scar  tissue. 4.  Cirrhosis. Recanalization of the umbilical vein, indicative of portal  hypertension.              .     Patient Active Problem List   Diagnosis Code    Pericardial effusion with cardiac tamponade I31.3, I31.4    Cardiac tamponade I31.4    Pleural effusion, left J90    Paroxysmal atrial fibrillation (HCC) I48.0    Obstructive sleep apnea G47.33    CAD S/P percutaneous coronary angioplasty I25.10, Z98.61    Severe obesity (HCC) E66.01       IMPRESSION:   · Dyspnea-predominantly at rest with features suggestive of possible upper airway etiology. Question if he has some vocal cord dysfunction related to intubation versus upper airway obstruction-redundant tissue, other anatomic and physiologic factors like tracheobronchomalacia need to be considered. Available imaging does not suggest pulmonary fibrosis  · History of pericardial effusion-unclear etiology. Cultures did not grow any pathogens and cytology was negative. Unfortunately I do not have cell counts or chemistries or any lupus testing on this fluid. Needs evaluation and work-up for possible collagen vascular process  · Abnormal CT scan of the chest  Pulmonary Arteries:  Subtle questionable filling defects are suggested in the right lower lobe posterior basal segmental artery subsegmental branch (axial #149), with additional irregular questionable filling defects in more distal branches. Of note, the right lower lobe are pulmonary venous branch appears to show a similar questionable filling defect (axial #147). Questionable irregular appearance of the left lower lobar subsegmental pulmonary artery branches are also identified. These may be artifactual in nature, perhaps due to motion artifact or pulsation artifact producing this unusual pattern. These findings are doubtful for PE. Most likely attributable to volume averaging artifact instead of no additional filling defects are identified elsewhere in both lungs. PE.  Lung, Pleura, Airways:  Small left pleural effusion. No significant right-sided pleural effusion. No dominant lung mass without discrete suspicious noncalcified lung nodule is detected.   Calcified granuloma in the left upper lobe (axial #67). No airspace opacities. Mediastinum, Jazmyn:  No convincing evidence for adenopathy. · Portal hypertension/cirrhosis reported on CT scan. May need further diagnostic evaluation  · History of CAD s/p PTCA  · Paroxysmal atrial fibrillation  · Left pleural effusion likely related to same etiology as the pericardial effusion  · Obstructive sleep apnea-on CPAP 12 cm with low AHI      RECOMMENDATIONS:   · Needs further evaluation-pulmonary function test will be completed including flow volume loop to evaluate upper airway  · Completed 6-minute walk  · Will consider further work-up after reviewing PFTs  · Ordering GEETHA screen, rheumassure, sed rate  · Consider VQ scan-evaluate portal pulmonary hypertension and possible PE (chronic)  · Will discuss anticoagulation with cardiology  · We will follow-up after about testing completed     Health maintenance screens deferred to Primary care provider.      Argenis Adames MD

## 2020-12-09 NOTE — PROGRESS NOTES
Femi Roman presents today for   Chief Complaint   Patient presents with    Shortness of Breath    Sleep Apnea       Is someone accompanying this pt? No    Is the patient using any DME equipment during OV? No    -[employer]     Depression Screening:  3 most recent PHQ Screens 8/4/2020   Little interest or pleasure in doing things Not at all   Feeling down, depressed, irritable, or hopeless Not at all   Total Score PHQ 2 0       Learning Assessment:  Learning Assessment 6/27/2017   PRIMARY LEARNER Patient   PRIMARY LANGUAGE ENGLISH   LEARNER PREFERENCE PRIMARY LISTENING   ANSWERED BY patient   RELATIONSHIP SELF       Abuse Screening:  No flowsheet data found. Fall Risk  No flowsheet data found. Coordination of Care:  1. Have you been to the ER, urgent care clinic since your last visit? Hospitalized since your last visit? No    2. Have you seen or consulted any other health care providers outside of the [address] [place] [address] since your last visit? Include any pap smears or colon screening.  No

## 2020-12-11 ENCOUNTER — HOSPITAL ENCOUNTER (OUTPATIENT)
Dept: LAB | Age: 65
Discharge: HOME | End: 2020-12-11

## 2020-12-11 LAB — XX-LABCORP SPECIMEN COL,LCBCF: NORMAL

## 2020-12-11 PROCEDURE — 99001 SPECIMEN HANDLING PT-LAB: CPT

## 2020-12-14 DIAGNOSIS — Z01.812 PRE-PROCEDURE LAB EXAM: ICD-10-CM

## 2020-12-14 LAB — SARS-COV-2, NAA: NOT DETECTED

## 2020-12-15 ENCOUNTER — OFFICE VISIT (OUTPATIENT)
Dept: PULMONOLOGY | Age: 65
End: 2020-12-15

## 2020-12-15 VITALS — WEIGHT: 270 LBS | BODY MASS INDEX: 37.8 KG/M2 | HEIGHT: 71 IN | TEMPERATURE: 96.7 F

## 2020-12-15 DIAGNOSIS — R06.02 SOB (SHORTNESS OF BREATH): Primary | ICD-10-CM

## 2020-12-20 LAB
14.3.3 ETA, RHEUM. ARTHRITIS: <0.2 NG/ML
CCP IGA+IGG SERPL IA-ACNC: <20 UNITS
CENTROMERE B AB SER-ACNC: <0.2 AI (ref 0–0.9)
CHROMATIN AB SERPL-ACNC: <0.2 AI (ref 0–0.9)
DSDNA AB SER-ACNC: <1 IU/ML (ref 0–9)
ENA JO1 AB SER-ACNC: <0.2 AI (ref 0–0.9)
ENA RNP AB SER-ACNC: <0.2 AI (ref 0–0.9)
ENA SCL70 AB SER-ACNC: <0.2 AI (ref 0–0.9)
ENA SM AB SER-ACNC: <0.2 AI (ref 0–0.9)
ENA SM+RNP AB SER-ACNC: <0.2 AI (ref 0–0.9)
ENA SS-A AB SER-ACNC: 0.2 AI (ref 0–0.9)
ENA SS-B AB SER-ACNC: <0.2 AI (ref 0–0.9)
ERYTHROCYTE [SEDIMENTATION RATE] IN BLOOD BY WESTERGREN METHOD: 12 MM/HR (ref 0–30)
RHEUMATOID FACT SERPL-ACNC: <14 UNITS/ML
RIBOSOMAL P AB SER-ACNC: <0.2 AI (ref 0–0.9)
SEE BELOW:, 164879: NORMAL

## 2021-01-11 RX ORDER — ROSUVASTATIN CALCIUM 10 MG/1
TABLET, COATED ORAL
Qty: 30 TAB | Refills: 6 | Status: SHIPPED | OUTPATIENT
Start: 2021-01-11 | End: 2021-02-02 | Stop reason: SDUPTHER

## 2021-01-26 ENCOUNTER — OFFICE VISIT (OUTPATIENT)
Dept: PULMONOLOGY | Age: 66
End: 2021-01-26
Payer: COMMERCIAL

## 2021-01-26 VITALS
OXYGEN SATURATION: 97 % | SYSTOLIC BLOOD PRESSURE: 160 MMHG | HEART RATE: 74 BPM | BODY MASS INDEX: 37.32 KG/M2 | RESPIRATION RATE: 18 BRPM | DIASTOLIC BLOOD PRESSURE: 77 MMHG | TEMPERATURE: 98.1 F | HEIGHT: 71 IN | WEIGHT: 266.6 LBS

## 2021-01-26 DIAGNOSIS — G47.33 OBSTRUCTIVE SLEEP APNEA: Primary | Chronic | ICD-10-CM

## 2021-01-26 DIAGNOSIS — R06.02 SOB (SHORTNESS OF BREATH): ICD-10-CM

## 2021-01-26 DIAGNOSIS — E66.01 SEVERE OBESITY (HCC): ICD-10-CM

## 2021-01-26 DIAGNOSIS — I48.0 PAROXYSMAL ATRIAL FIBRILLATION (HCC): Chronic | ICD-10-CM

## 2021-01-26 PROCEDURE — 99214 OFFICE O/P EST MOD 30 MIN: CPT | Performed by: INTERNAL MEDICINE

## 2021-01-26 NOTE — PROGRESS NOTES
Karina Haskins presents today for   Chief Complaint   Patient presents with    Results     PFT 12/15/2020  Labs       Is someone accompanying this pt? No    Is the patient using any DME equipment during OV? No    -DME Company NA    Depression Screening:  3 most recent PHQ Screens 8/4/2020   Little interest or pleasure in doing things Not at all   Feeling down, depressed, irritable, or hopeless Not at all   Total Score PHQ 2 0       Learning Assessment:  Learning Assessment 6/27/2017   PRIMARY LEARNER Patient   PRIMARY LANGUAGE ENGLISH   LEARNER PREFERENCE PRIMARY LISTENING   ANSWERED BY patient   RELATIONSHIP SELF       Abuse Screening:  No flowsheet data found. Fall Risk  No flowsheet data found. Coordination of Care:  1. Have you been to the ER, urgent care clinic since your last visit? Hospitalized since your last visit? No    2. Have you seen or consulted any other health care providers outside of the [address] [place] [address] since your last visit? Include any pap smears or colon screening.  No

## 2021-01-26 NOTE — PROGRESS NOTES
[facility]  Pulmonary, Critical Care, and Sleep Medicine      Pulmonary Office follow-up visit    Name: Regi Colindres     : 1955     Date: 2021        Subjective:   Patient has been referred for evaluation of: SOB on exertion. 21     Feels significantly improved  States that after he completed PFTs and 6-minute walk he was more reassured and has started exercising again  He has been using the gym and swimming 2-3 times per week and is beginning to feel improved. Does mention that when he lost weight he felt much better and he is trying to make efforts at losing weight again. He has been using his CPAP diligently-feels more rested  He completed the requested testing and is here to discuss the results    HPI  Patient is a 60 y.o. male is here for chief complaints of shortness of breath on exertion. Patient states that earlier in the year in January he was experiencing progressive symptoms of shortness of breath and was diagnosed with a pericardial effusion with near tamponade. He underwent a pericardial window with drainage of the effusion at [facility] and subsequently has not had any recurrence of the pericardial effusion. He mentions that the symptoms of shortness of breath seem to improve after the drainage however over a period of time he still has residual symptoms and does not feel back to his previous state. He does describe symptoms as predominantly occurring at rest-especially when he is talking or he gets excited. He mentions that he is able to exercise on a treadmill lift weights 4 times a week workout 4 times a week and swim half a mile once a week without any dyspnea. He denies any associated symptoms of cough or wheezing. He has been diagnosed with sleep apnea in  and was prescribed a CPAP machine which she has been using regularly. He has never followed up with a sleep physician and has the same machine that he got 11 years back.   I have download from his last 90 days use-excellent compliance with 90 out of 90 days usage for more than 8 hours at a CPAP pressure of 12 cm and a resultant AHI of 0.7. Patient denies any swelling of the lower extremities  He states that he was on Eliquis for atrial fibrillation but stopped it due to cost.  Currently takes aspirin    Patient is an ex-smoker quit smoking 10 years back. 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    ILD history:  No Hx of connective tissue disease such as RA, Lupus, Scleroderma  No Hx Raynauds  No Hx sarcoidosis  No Hx taking medications- methotrexate, Amiodarone, Nitrofurantoin  No Hx cancer, chemotherapy, radiation  Hx Birds, chickens, farm animals  No Hx using hair spray  No Hx asbestos exposure, coal mining, textile industry work, construction work  Hx smoking  No Hx TB/positive PPD       Review of data:  Testing:  CXR  CTA chest-2/13/2020: Question of filling defect in the right lower lobe subsegment, small left pleural effusion, findings of cirrhosis and portal hypertension  PFT-not available  Echo-7/20/2020: EF 55 to 60%, dilated RV RA, no pulmonary hypertension-PA P 29 mmHg    Sleep studies-not available    Serologies-were ordered but canceled  Vaccinations:  Pneumococcal  Influenza    DME:    PAP device-CPAP    Past Medical History:   Diagnosis Date    Atrial fibrillation ([address] 01/07/2020    Coronary artery disease     Coronary atherosclerosis of native coronary artery     Stable, no recent angina    ED (erectile dysfunction)     Elevated blood pressure reading without diagnosis of hypertension     Monitor     History of hepatitis C     treated with interferon    Obstructive sleep apnea 1/20/2020    MELANI on CPAP     Other and unspecified hyperlipidemia     Paroxysmal atrial fibrillation (HCC) 1/8/2020    Platelet inhibition due to Plavix     on Eliquis    Postsurgical percutaneous transluminal coronary angioplasty status     Eight years since stent, no recent stent testing, EKG normal     Past Surgical History:   Procedure Laterality Date    HX CORONARY STENT PLACEMENT  2004    HX HEART VALVE SURGERY       No Known Allergies    Current Outpatient Medications   Medication Sig Dispense Refill    rosuvastatin (CRESTOR) 10 mg tablet TAKE 1 TABLET BY MOUTH EVERY NIGHT 30 Tab 6    colchicine 0.6 mg tablet TAKE 1 TABLET BY MOUTH DAILY 90 Tab 0    metoprolol tartrate (LOPRESSOR) 50 mg tablet TAKE 1 TABLET BY MOUTH TWICE DAILY 60 Tab 3    co-enzyme Q-10 (Co Q-10) 100 mg capsule Take 100 mg by mouth daily.  aspirin 81 mg chewable tablet Take 1 Tab by mouth daily. 30 Tab 0    omega 3-dha-epa-fish oil 60- mg cap Take 500 mg by mouth.  apixaban (ELIQUIS) 5 mg tablet Take 1 Tab by mouth two (2) times a day.  60 Tab 5     Review of Systems:  HEENT: No epistaxis, no nasal drainage, no difficulty in swallowing, no redness in eyes  Respiratory: as above  Cardiovascular: no chest pain, no palpitations, no chronic leg edema, no syncope  Gastrointestinal: no abd pain, no vomiting, no diarrhea, no bleeding symptoms  Genitourinary: No urinary symptoms or hematuria  Integument/breast: No ulcers or rashes  Musculoskeletal:Neg  Neurological: No focal weakness, no seizures, no headaches  Behvioral/Psych: No anxiety, no depression  Constitutional: No fever, no chills, no weight loss, no night sweats     Objective:     Visit Vitals  BP (!) 160/77 (BP 1 Location: Right arm, BP Patient Position: Sitting)   Pulse 74   Temp 98.1 °F (36.7 °C) (Oral)   Resp 18   Ht 5' 11\" (1.803 m)   Wt 120.9 kg (266 lb 9.6 oz)   SpO2 97% Comment: RA Rest   BMI 37.18 kg/m²        Physical Exam:   General: comfortable, no acute distress  HEENT: pupils reactive, sclera anicteric, EOM intact  Neck: No adenopathy or thyroid swelling, no lymphadenopathy or JVD, supple  CVS: S1S2 no murmurs  RS: Mod AE bilaterally, no tactile fremitus or egophony, no accessory muscle use  Abd: soft, non tender, no hepatosplenomegaly  Neuro: non focal, awake, alert  Extrm: no leg edema, clubbing or cyanosis  Skin: no rash    Data review:   Pertinent labs: CBC, BMP, LFT's  Sed rate normal  GEETHA panel negative  Rheumatoid negative    PFT:            6 min walk test; completed in clinic today walked a distance of 437 m without oxygen desaturation or change in dyspnea index  Patient completed 6-minute walk test without difficulty. Walked a distance of 5 m with dyspnea index ranging from 1-4 at peak exercise.  Did not have any oxygen desaturation.  Saturations between 95 to 98% on room air.  Heart rate remained between 60-84. Impression   No significant oxygen desaturation during walk test.  Dyspnea index unchanged. Suggest clinical correlation. Results for orders placed or performed during the hospital encounter of 02/13/20   EKG, 12 LEAD, INITIAL   Result Value Ref Range    Ventricular Rate 61 BPM    Atrial Rate 61 BPM    P-R Interval 164 ms    QRS Duration 88 ms    Q-T Interval 414 ms    QTC Calculation (Bezet) 416 ms    Calculated P Axis 65 degrees    Calculated R Axis 55 degrees    Calculated T Axis 42 degrees    Diagnosis       Normal sinus rhythm  Normal ECG  When compared with ECG of 20-JAN-2020 11:00,  Vent.  rate has decreased BY  39 BPM  Nonspecific T wave abnormality no longer evident in Lateral leads  Confirmed by Yosvany Bourgeois MD, ----- ([id number]) on 2/13/2020 4:09:04 PM         Imaging:  I have personally reviewed the patients radiographs and have reviewed the reports:  XR Results (most recent):  [place] [facility] [place]   XR CHEST PA LAT    Narrative HISTORY: Shortness of breath. EXAM: Chest.    TECHNIQUE: Two views of the chest were obtained. COMPARISON: 1/30/2020    FINDINGS: Unchanged aeration of bilateral hemithoraces is demonstrated with  minimal bilateral diffuse interstitial prominence without pneumothorax pneumonia  or pleural effusions. Heart and mediastinal structures are unchanged. Heart is  enlarged. . Visualized bony thorax and soft tissues are within normal limits. Unchanged left upper lobe pulmonary nodule. Impression  IMPRESSION:    1. Unchanged sequela of minimal congestion. CT Results (most recent):  Results from Hospital Encounter encounter on 02/13/20   CTA CHEST W OR W WO CONT    Narrative EXAM:  CTA Chest with Contrast         CLINICAL INDICATION:  Shortness of breath. Low-grade fever. Pericardial  effusion on 01/20/20. COMPARISON:  None. TECHNIQUE:      - Helical volumetric sections of the chest are obtained with CT pulmonary  angiogram protocol. Subsequently, sagittal and coronal multiplanar  reconstruction images are obtained. Maximum intensity projection images are  generated to better delineate the pulmonary vasculature, differentiate between  the pulmonary arteries and veins and to increase sensitivity to pulmonary  emboli.    - IV contrast 100 mL Isovue-370.  - Radiation dose optimization techniques are utilized as appropriate to the  exam, with combination of automated exposure control, adjustment of the mA  and/or kV according to patient's size (Including appropriate matching for  site-specific examinations), or use of iterative reconstruction technique. FINDINGS:     Pulmonary Arteries:  Subtle questionable filling defects are suggested in the  right lower lobe posterior basal segmental artery subsegmental branch (axial  #149), with additional irregular questionable filling defects in more distal  branches.   Of note, the right lower lobe are pulmonary venous branch appears to  show a similar questionable filling defect (axial #147). Questionable irregular  appearance of the left lower lobar subsegmental pulmonary artery branches are  also identified. These may be artifactual in nature, perhaps due to motion  artifact or pulsation artifact producing this unusual pattern. These findings  are doubtful for PE. Most likely attributable to volume averaging artifact  instead of no additional filling defects are identified elsewhere in both lungs. PE.    Lung, Pleura, Airways:  Small left pleural effusion. No significant right-sided  pleural effusion. No dominant lung mass without discrete suspicious  noncalcified lung nodule is detected. Calcified granuloma in the left upper  lobe (axial #67). No airspace opacities. Mediastinum, Jazmyn:  No convincing evidence for adenopathy. Cardiovascular:  No evidence of aortic dissection or aneurysm. Small  pericardial effusion. Chest Wall:  There is a focal fluid collection and possible scar tissue along  the inferior aspect of the xiphoid process (axial #184), measuring about 5.2 cm  AP dimension and about 2.3 cm in width. Bilateral gynecomastia. Base of Neck:  No acute findings. Axillae:  Unremarkable. Abdomen:  No acute abnormalities. There is recanalization of the umbilical  vein. Suggestive of portal hypertension which in turn suggests underlying  cirrhosis. Skeletal Structures:  No acute findings. Impression IMPRESSION:         1. Possible \"filling defect\" in the right lower lobe subsegmental branch  artery. Not convincing for PE. No definite corresponding abnormality on the  coronal and sagittal reconstruction images. Suspect volume averaging artifact. No convincing evidence of pulmonary embolism elsewhere. 2.  Small left pleural effusion. 3.  Questionable soft tissue density in the subxiphoid region.   Query recent  pericardial window or other types of drainage procedure with residual scar  tissue. 4.  Cirrhosis. Recanalization of the umbilical vein, indicative of portal  hypertension. .     Patient Active Problem List   Diagnosis Code    Pericardial effusion with cardiac tamponade I31.3, I31.4    Cardiac tamponade I31.4    Pleural effusion, left J90    Paroxysmal atrial fibrillation (HCC) I48.0    Obstructive sleep apnea G47.33    CAD S/P percutaneous coronary angioplasty I25.10, Z98.61    Severe obesity ([address] E66.01       IMPRESSION:   · Dyspnea-predominantly at rest with features suggestive of possible upper airway etiology. Question if he has some vocal cord dysfunction related to intubation versus upper airway obstruction-redundant tissue, other anatomic and physiologic factors like tracheobronchomalacia need to be considered. Available imaging does not suggest pulmonary fibrosis . PFTs with mild restrictive impairment and flow volume loop with very mild sawtooth appearance and plateauing of flow  · History of pericardial effusion-unclear etiology. Cultures did not grow any pathogens and cytology was negative. Unfortunately I do not have cell counts or chemistries or any lupus testing on this fluid. Serologic testing for collagen vascular process unyielding  · Abnormal CT scan of the chest  Pulmonary Arteries:  Subtle questionable filling defects are suggested in the right lower lobe posterior basal segmental artery subsegmental branch (axial #149), with additional irregular questionable filling defects in more distal branches. Of note, the right lower lobe are pulmonary venous branch appears to show a similar questionable filling defect (axial #147). Questionable irregular appearance of the left lower lobar subsegmental pulmonary artery branches are also identified. These may be artifactual in nature, perhaps due to motion artifact or pulsation artifact producing this unusual pattern.   These findings are doubtful for PE.  Most likely attributable to volume averaging artifact instead of no additional filling defects are identified elsewhere in both lungs. PE.  Lung, Pleura, Airways:  Small left pleural effusion. No significant right-sided pleural effusion. No dominant lung mass without discrete suspicious noncalcified lung nodule is detected. Calcified granuloma in the left upper lobe (axial #67). No airspace opacities. Mediastinum, Jazmyn:  No convincing evidence for adenopathy. · Portal hypertension/cirrhosis reported on CT scan. May need further diagnostic evaluation  · History of CAD s/p PTCA  · Paroxysmal atrial fibrillation  · Left pleural effusion likely related to same etiology as the pericardial effusion  · Obstructive sleep apnea-on CPAP 12 cm with low AHI      RECOMMENDATIONS:   · Discussed findings of PFT  · Would encourage use of CPAP-he is complying if needed at a higher pressure  · Patient is incrementally increasing physical activity-goes to the gym, swims and has been feeling significantly improved  · Monitor for symptom response if continues to have resting dyspnea will consider further evaluation  · Consider VQ scan-evaluate portal pulmonary hypertension and possible PE (chronic)  · Will discuss anticoagulation with cardiology  · Preventive vaccinations  · We will follow-up in 6 months and sooner should there be any change     Health maintenance screens deferred to Primary care provider.      Cristhian Alejandra MD

## 2021-01-26 NOTE — LETTER
1/26/2021 Patient: Lyndon Seth YOB: 1955 Date of Visit: 1/26/2021 Ofelia Robison MD 
[address] [address] [address] [employer] Dear Ofelia Robison MD, Thank you for referring Mr. Lyndon Seth to [address] [place] [address] for evaluation. My notes for this consultation are attached. If you have questions, please do not hesitate to call me. I look forward to following your patient along with you.  
 
 
Sincerely, 
 
Clair Abel MD

## 2021-02-02 RX ORDER — ROSUVASTATIN CALCIUM 10 MG/1
TABLET, COATED ORAL
Qty: 30 TAB | Refills: 6 | Status: SHIPPED | OUTPATIENT
Start: 2021-02-02 | End: 2021-12-14

## 2021-04-08 DIAGNOSIS — I30.9 ACUTE PERICARDITIS, UNSPECIFIED TYPE: ICD-10-CM

## 2021-04-08 RX ORDER — COLCHICINE 0.6 MG/1
TABLET ORAL
Qty: 90 TAB | Refills: 0 | Status: SHIPPED | OUTPATIENT
Start: 2021-04-08 | End: 2022-01-12

## 2021-08-16 ENCOUNTER — OFFICE VISIT (OUTPATIENT)
Dept: FAMILY MEDICINE CLINIC | Age: 66
End: 2021-08-16
Payer: MEDICARE

## 2021-08-16 ENCOUNTER — HOSPITAL ENCOUNTER (OUTPATIENT)
Dept: LAB | Age: 66
Discharge: HOME | End: 2021-08-16
Payer: MEDICARE

## 2021-08-16 VITALS — OXYGEN SATURATION: 97 % | DIASTOLIC BLOOD PRESSURE: 84 MMHG | SYSTOLIC BLOOD PRESSURE: 155 MMHG | HEART RATE: 73 BPM

## 2021-08-16 DIAGNOSIS — Z12.11 ENCOUNTER FOR SCREENING COLONOSCOPY: ICD-10-CM

## 2021-08-16 DIAGNOSIS — G47.33 OBSTRUCTIVE SLEEP APNEA: Chronic | ICD-10-CM

## 2021-08-16 DIAGNOSIS — N52.9 ERECTILE DYSFUNCTION, UNSPECIFIED ERECTILE DYSFUNCTION TYPE: ICD-10-CM

## 2021-08-16 DIAGNOSIS — N52.8 OTHER MALE ERECTILE DYSFUNCTION: ICD-10-CM

## 2021-08-16 DIAGNOSIS — I10 ESSENTIAL HYPERTENSION: Primary | ICD-10-CM

## 2021-08-16 DIAGNOSIS — D23.30 BENIGN NEOPLASM OF SKIN OF FACE: ICD-10-CM

## 2021-08-16 DIAGNOSIS — N40.1 BENIGN PROSTATIC HYPERPLASIA WITH LOWER URINARY TRACT SYMPTOMS, SYMPTOM DETAILS UNSPECIFIED: ICD-10-CM

## 2021-08-16 DIAGNOSIS — I10 ESSENTIAL HYPERTENSION: ICD-10-CM

## 2021-08-16 DIAGNOSIS — B18.2 CHRONIC HEPATITIS C WITHOUT HEPATIC COMA (HCC): ICD-10-CM

## 2021-08-16 LAB
ALBUMIN SERPL-MCNC: 3.7 G/DL (ref 3.5–4.7)
ALBUMIN/GLOB SERPL: 1.3 {RATIO}
ALP SERPL-CCNC: 149 U/L (ref 38–126)
ALT SERPL-CCNC: 92 U/L (ref 3–72)
ANION GAP SERPL CALC-SCNC: 8 MMOL/L
AST SERPL W P-5'-P-CCNC: 95 U/L (ref 17–74)
BILIRUB SERPL-MCNC: 2.1 MG/DL (ref 0.2–1)
BUN SERPL-MCNC: 12 MG/DL (ref 9–21)
BUN/CREAT SERPL: 17
CA-I BLD-MCNC: 8.7 MG/DL (ref 8.5–10.5)
CHLORIDE SERPL-SCNC: 106 MMOL/L (ref 94–111)
CO2 SERPL-SCNC: 23 MMOL/L (ref 21–33)
CREAT SERPL-MCNC: 0.7 MG/DL (ref 0.8–1.5)
ERYTHROCYTE [DISTWIDTH] IN BLOOD BY AUTOMATED COUNT: 13.1 % (ref 11.6–14.5)
GLOBULIN SER CALC-MCNC: 2.9 G/DL
GLUCOSE SERPL-MCNC: 123 MG/DL (ref 70–110)
HCT VFR BLD AUTO: 43 % (ref 36–48)
HGB BLD-MCNC: 15.1 G/DL (ref 13–16)
MCH RBC QN AUTO: 34.5 PG (ref 24–34)
MCHC RBC AUTO-ENTMCNC: 35.1 G/DL (ref 31–37)
MCV RBC AUTO: 98.2 FL (ref 74–97)
PLATELET # BLD AUTO: 85 K/UL (ref 135–420)
PMV BLD AUTO: 10.6 FL (ref 9.2–11.8)
POTASSIUM SERPL-SCNC: 3.9 MMOL/L (ref 3.2–5.1)
PROT SERPL-MCNC: 6.6 G/DL (ref 6.1–8.4)
RBC # BLD AUTO: 4.38 M/UL (ref 4.7–5.5)
SODIUM SERPL-SCNC: 137 MMOL/L (ref 135–145)
WBC # BLD AUTO: 5.2 K/UL (ref 4.6–13.2)

## 2021-08-16 PROCEDURE — 3017F COLORECTAL CA SCREEN DOC REV: CPT | Performed by: FAMILY MEDICINE

## 2021-08-16 PROCEDURE — 1101F PT FALLS ASSESS-DOCD LE1/YR: CPT | Performed by: FAMILY MEDICINE

## 2021-08-16 PROCEDURE — 99213 OFFICE O/P EST LOW 20 MIN: CPT | Performed by: FAMILY MEDICINE

## 2021-08-16 PROCEDURE — G8417 CALC BMI ABV UP PARAM F/U: HCPCS | Performed by: FAMILY MEDICINE

## 2021-08-16 PROCEDURE — G8510 SCR DEP NEG, NO PLAN REQD: HCPCS | Performed by: FAMILY MEDICINE

## 2021-08-16 PROCEDURE — G8536 NO DOC ELDER MAL SCRN: HCPCS | Performed by: FAMILY MEDICINE

## 2021-08-16 PROCEDURE — 80053 COMPREHEN METABOLIC PANEL: CPT

## 2021-08-16 PROCEDURE — 36415 COLL VENOUS BLD VENIPUNCTURE: CPT

## 2021-08-16 PROCEDURE — G8427 DOCREV CUR MEDS BY ELIG CLIN: HCPCS | Performed by: FAMILY MEDICINE

## 2021-08-16 PROCEDURE — 85027 COMPLETE CBC AUTOMATED: CPT

## 2021-08-16 RX ORDER — LOSARTAN POTASSIUM 50 MG/1
50 TABLET ORAL DAILY
Qty: 30 TABLET | Refills: 1 | Status: SHIPPED | OUTPATIENT
Start: 2021-08-16 | End: 2021-08-24

## 2021-08-16 NOTE — PROGRESS NOTES
Abilio Young presents today for   Chief Complaint   Patient presents with    Physical     general check up        Is someone accompanying this pt? No      Is the patient using any DME equipment during OV? No      Depression Screening:  3 most recent PHQ Screens 8/16/2021   Little interest or pleasure in doing things Not at all   Feeling down, depressed, irritable, or hopeless Not at all   Total Score PHQ 2 0       Learning Assessment:  Learning Assessment 6/27/2017   PRIMARY LEARNER Patient   PRIMARY LANGUAGE ENGLISH   LEARNER PREFERENCE PRIMARY LISTENING   ANSWERED BY patient   RELATIONSHIP SELF       Fall Risk  Fall Risk Assessment, last 12 mths 8/16/2021   Able to walk? Yes   Fall in past 12 months? 0   Do you feel unsteady? 0   Are you worried about falling 0       ADL  No flowsheet data found. Travel Screening:    Travel Screening     Question   Response    In the last month, have you been in contact with someone who was confirmed or suspected to have Coronavirus / COVID-19? No / Unsure    Have you had a COVID-19 viral test in the last 14 days? No    Do you have any of the following new or worsening symptoms? None of these    Have you traveled internationally or domestically in the last month? No      Travel History   Travel since 07/16/21     No documented travel since 07/16/21          Health Maintenance reviewed and discussed and ordered per Provider. Health Maintenance Due   Topic Date Due    Hepatitis C Screening  Never done    Lipid Screen  Never done    COVID-19 Vaccine (1) Never done    DTaP/Tdap/Td series (1 - Tdap) Never done    Colorectal Cancer Screening Combo  Never done    Shingrix Vaccine Age 50> (1 of 2) Never done    Pneumococcal 65+ years (1 of 1 - PPSV23) Never done   . Coordination of Care:  1. Have you been to the ER, urgent care clinic since your last visit? Hospitalized since your last visit? No      2.  Have you seen or consulted any other health care providers outside of the [address] [place] [address] since your last visit? Include any pap smears or colon screening.  No

## 2021-08-16 NOTE — PROGRESS NOTES
Subjective:   Arnulfo Marie is a 73 y.o. male who was seen for Physical (general check up )    HPI the patient is a 55-year-old male who is seen for evaluation. He thinks he had a colonoscopy 3 or 4 years ago. He has hypertension and his blood pressures have been elevated he has erectile dysfunction and a urologist had him on Viagra and Cialis at the same time. He has had some chronic hepatitis C but that has been stable although a biopsy several years ago showed some cirrhosis he has a lesion on his face that he would like frozen that looks warty. He has had no falls or injuries he has been sleeping well has a history of cardiac tamponade and has had stenting. He has some window and there to prevent further issues. He is followed by cardiology but was unable to take metoprolol because it made him too tired. He has had no falls or injuries he is eating well he uses some alcohol. He is not a smoker. Home Medications    Medication Sig Start Date End Date Taking? Authorizing Provider   losartan (COZAAR) 50 mg tablet Take 1 Tablet by mouth daily. 21  Yes Rodo Garcia MD   colchicine 0.6 mg tablet TAKE ONE (1) TABLET BY MOUTH EVERY DAY   21  Yes Ирина Sam NP   rosuvastatin (CRESTOR) 10 mg tablet TAKE 1 TABLET BY MOUTH EVERY NIGHT 21  Yes Brannon Boyer NP   co-enzyme Q-10 (Co Q-10) 100 mg capsule Take 100 mg by mouth daily. Yes Provider, Historical   aspirin 81 mg chewable tablet Take 1 Tab by mouth daily. 20  Yes Ibeth Hung MD   omega 3-dha-epa-fish oil 60- mg cap Take 500 mg by mouth.    Yes Provider, Historical   metoprolol tartrate (LOPRESSOR) 50 mg tablet TAKE 1 TABLET BY MOUTH TWICE DAILY  Patient not taking: Reported on 2021 10/7/20   Rakesh Nichols MD      No Known Allergies  Social History     Tobacco Use    Smoking status: Former Smoker     Quit date:      Years since quittin.6    Smokeless tobacco: Former User    Tobacco comment: Remotely quit tobacco use 2010   Substance Use Topics    Alcohol use: Yes     Comment: Occasional    Drug use: Never            Review of Systems   Constitutional: Negative. HENT: Negative. Eyes: Negative. Gastrointestinal: Negative. Endocrine: Negative. Genitourinary: Negative. Musculoskeletal: Negative. Neurological: Negative. Psychiatric/Behavioral: Negative. Physical Exam   Objective:     Visit Vitals  BP (!) 155/84   Pulse 73   SpO2 97%      General: alert, cooperative, no distress   Mental  status: normal mood, behavior, speech, dress, motor activity, and thought processes, able to follow commands   HENT: NCAT   Neck: no visualized mass   Resp: no respiratory distress   Neuro: no gross deficits   Skin: no discoloration or lesions of concern on visible areas   Psychiatric: normal affect, consistent with stated mood, no evidence of hallucinations   There is a benign lesion that almost looks warty on the right side of the face the lungs are clear he has a regular rate and rhythm the abdomen is soft the extremities are clear he is pleasant and cooperative in no significant distress. No rashes. Bowel movements have been appropriate. Assessment & Plan:     History of hepatitis C treated and followed up appropriately sleep apnea stable history of cardiac tamponade with a window history of hypertension BPH erectile dysfunction benign neoplasm of the skin: The area on the face was frozen with liquid nitrogen with any other issues arise. His blood work needs to be updated with a complete metabolic panel and a CBC. I am going to add losartan for blood pressure medicine. We will follow-up if any other issues arise. He is going to check his blood pressure let me know in 3 weeks what is doing I will call him with the results of the blood work. He is followed by cardiology on a regular basis. 712    Additional exam findings:        We discussed the expected course, resolution and complications of the diagnosis(es) in detail. Medication risks, benefits, costs, interactions, and alternatives were discussed as indicated. I advised him to contact the office if his condition worsens, changes or fails to improve as anticipated. He expressed understanding with the diagnosis(es) and plan.

## 2021-08-21 ENCOUNTER — TELEPHONE (OUTPATIENT)
Dept: FAMILY MEDICINE CLINIC | Age: 66
End: 2021-08-21

## 2021-08-21 NOTE — TELEPHONE ENCOUNTER
I called the patient his liver function tests were slightly increased more he is followed by gastroenterology.   When I reviewed the bilirubin it was 2.1 and it had been 1.2 he is setting up an appointment with his GI doctor anyway they will review it

## 2021-08-24 ENCOUNTER — TELEPHONE (OUTPATIENT)
Dept: FAMILY MEDICINE CLINIC | Age: 66
End: 2021-08-24

## 2021-08-24 DIAGNOSIS — I10 ESSENTIAL HYPERTENSION: Primary | ICD-10-CM

## 2021-08-24 RX ORDER — LOSARTAN POTASSIUM 50 MG/1
50 TABLET ORAL DAILY
Qty: 30 TABLET | Refills: 2 | Status: SHIPPED | OUTPATIENT
Start: 2021-08-24 | End: 2021-08-25 | Stop reason: SDUPTHER

## 2021-08-25 ENCOUNTER — TELEPHONE (OUTPATIENT)
Dept: CARDIOLOGY CLINIC | Age: 66
End: 2021-08-25

## 2021-08-25 DIAGNOSIS — I10 ESSENTIAL HYPERTENSION: ICD-10-CM

## 2021-08-25 RX ORDER — LOSARTAN POTASSIUM 50 MG/1
50 TABLET ORAL DAILY
Qty: 30 TABLET | Refills: 2 | Status: SHIPPED | OUTPATIENT
Start: 2021-08-25 | End: 2021-11-15 | Stop reason: DRUGHIGH

## 2021-08-25 NOTE — TELEPHONE ENCOUNTER
Patient asking is it safe to take the covid-19 vaccine, being that he had some type of Diffusing procedure last year at [facility]. States that he had fluid build-up in th lungs during that time. Please advise.

## 2021-08-25 NOTE — TELEPHONE ENCOUNTER
5/7/2021 4:49 PM    Ms. Inocente Santillan   [address] [facility] [address]    I received a message that you have not read the [employer] message that I sent to you. This is what it said:    Alejandrina Temple reviewed your lab results. Your thyroid function is normal. Your cholesterol is slightly higher, but overall stable because the \"good\" HDL cholesterol has improved. Your vitamin D level is very low at 13.5 (normal is )-recommend increasing Vitamin D3 to 5,000 units capsules-take 2 capsules daily with food x 3 weeks then decrease to 1 capsule daily with food as maintenance. Your fasting blood sugar was good at 110, but your A1C is worse at 7.3% (goal is less than 7)-increase is likely due to recent weight gain. I recommend you attempt improved diet and increased exercise to address. Follow up with me in 2-3 months to reassess status-if no improvement then we will look at changing your medication. Your blood counts are stable-no anemia.  Your liver and kidney function are normal.   Let me know if you have any questions,   Sameera Rodríguez         Sincerely,      JANI Jimenez Patient needs Losartan sent to [facility]. It was sent to [facility].

## 2021-08-25 NOTE — TELEPHONE ENCOUNTER
Could you please clarify what procedure he had? Also - we have not seen him since 8/2020.   Thank you

## 2021-08-26 NOTE — TELEPHONE ENCOUNTER
Patient called the office and was notified and Per Dr. Dominique Anna don't see any cardiac restriction for vaccine.

## 2021-09-24 ENCOUNTER — OFFICE VISIT (OUTPATIENT)
Dept: CARDIOLOGY CLINIC | Age: 66
End: 2021-09-24
Payer: MEDICARE

## 2021-09-24 VITALS
DIASTOLIC BLOOD PRESSURE: 70 MMHG | HEART RATE: 68 BPM | HEIGHT: 70 IN | SYSTOLIC BLOOD PRESSURE: 148 MMHG | BODY MASS INDEX: 38.22 KG/M2 | WEIGHT: 267 LBS

## 2021-09-24 DIAGNOSIS — Z98.61 CAD S/P PERCUTANEOUS CORONARY ANGIOPLASTY: ICD-10-CM

## 2021-09-24 DIAGNOSIS — I25.10 CAD S/P PERCUTANEOUS CORONARY ANGIOPLASTY: ICD-10-CM

## 2021-09-24 DIAGNOSIS — R06.02 SOBOE (SHORTNESS OF BREATH ON EXERTION): Primary | ICD-10-CM

## 2021-09-24 DIAGNOSIS — G47.33 OSA (OBSTRUCTIVE SLEEP APNEA): ICD-10-CM

## 2021-09-24 DIAGNOSIS — I30.9 ACUTE PERICARDITIS, UNSPECIFIED TYPE: ICD-10-CM

## 2021-09-24 DIAGNOSIS — E78.5 DYSLIPIDEMIA: ICD-10-CM

## 2021-09-24 DIAGNOSIS — I50.33 DIASTOLIC CHF, ACUTE ON CHRONIC (HCC): ICD-10-CM

## 2021-09-24 DIAGNOSIS — I48.0 PAROXYSMAL ATRIAL FIBRILLATION (HCC): ICD-10-CM

## 2021-09-24 PROCEDURE — 93000 ELECTROCARDIOGRAM COMPLETE: CPT | Performed by: NURSE PRACTITIONER

## 2021-09-24 PROCEDURE — 99214 OFFICE O/P EST MOD 30 MIN: CPT | Performed by: NURSE PRACTITIONER

## 2021-09-24 RX ORDER — FUROSEMIDE 20 MG/1
20 TABLET ORAL DAILY
Qty: 90 TABLET | Refills: 2 | Status: SHIPPED | OUTPATIENT
Start: 2021-09-24 | End: 2022-09-14

## 2021-09-24 RX ORDER — TADALAFIL 2.5 MG/1
2.5 TABLET ORAL DAILY
COMMUNITY
End: 2022-06-16

## 2021-09-24 NOTE — PROGRESS NOTES
HISTORY OF PRESENT ILLNESS  Raul Rocha is a 73 y.o. male. 1/2020. Patient seen in office today with increasing shortness of breath started about a week ago which is progressive associated with orthopnea PND. Denies any chest pain but he is short of breath on minimal activity. He has noted palpitation. 9/2021  Patient presents with complaints of shortness of breath and increased bilateral lower extremity edema. He reports this has been intermittent over the past few months. He also complains of occasional palpitations. He denies chest pain or orthopnea. He reports stopped taking metoprolol several months ago because he thought this was contributing to his depression. He is no longer taking Eliquis and is unsure why. Cholesterol Problem  The history is provided by the patient. This is a chronic problem. The problem occurs constantly. The problem has not changed since onset. Associated symptoms include shortness of breath. Pertinent negatives include no chest pain, no abdominal pain and no headaches. Shortness of Breath  The history is provided by the patient. This is a new problem. The problem occurs frequently. The problem has been gradually worsening. Associated symptoms include leg swelling. Pertinent negatives include no fever, no headaches, no cough, no sputum production, no hemoptysis, no wheezing, no PND, no orthopnea, no chest pain, no vomiting, no abdominal pain, no rash and no claudication. Precipitated by: exertion. He has tried nothing for the symptoms. Associated medical issues include CAD. Associated medical issues do not include COPD or chronic lung disease. Follow-up  Associated symptoms include shortness of breath. Pertinent negatives include no chest pain, no abdominal pain and no headaches. Review of Systems   Constitutional: Negative for chills and fever. HENT: Negative for nosebleeds. Eyes: Negative for blurred vision and double vision.    Respiratory: Positive for shortness of breath. Negative for cough, hemoptysis, sputum production and wheezing. Cardiovascular: Positive for palpitations and leg swelling. Negative for chest pain, orthopnea, claudication and PND. Gastrointestinal: Negative for abdominal pain, heartburn, nausea and vomiting. Musculoskeletal: Negative for myalgias. Skin: Negative for rash. Neurological: Negative for dizziness, weakness and headaches. Endo/Heme/Allergies: Does not bruise/bleed easily. Family History   Problem Relation Age of Onset    Heart Disease Father     Cancer Mother        Past Medical History:   Diagnosis Date    Atrial fibrillation ([facility] [address] 2020    Coronary artery disease     Coronary atherosclerosis of native coronary artery     Stable, no recent angina    ED (erectile dysfunction)     Elevated blood pressure reading without diagnosis of hypertension     Monitor     History of hepatitis C     treated with interferon    Obstructive sleep apnea 2020    MELANI on CPAP     Other and unspecified hyperlipidemia     Paroxysmal atrial fibrillation ([facility] [address] 2020    Platelet inhibition due to Plavix     on Eliquis    Postsurgical percutaneous transluminal coronary angioplasty status     Eight years since stent, no recent stent testing, EKG normal       Past Surgical History:   Procedure Laterality Date    HX CORONARY STENT PLACEMENT      HX HEART VALVE SURGERY         Social History     Tobacco Use    Smoking status: Former Smoker     Quit date:      Years since quittin.7    Smokeless tobacco: Former User    Tobacco comment: Remotely quit tobacco use    Substance Use Topics    Alcohol use: Yes     Comment: Occasional       No Known Allergies    Prior to Admission medications    Medication Sig Start Date End Date Taking? Authorizing Provider   tadalafiL (Cialis) 2.5 mg tablet Take 2.5 mg by mouth.    Yes Provider, Historical   furosemide (LASIX) 20 mg tablet Take 1 Tablet by mouth daily. 9/24/21  Yes Brannon Boyer NP   losartan (COZAAR) 50 mg tablet Take 1 Tablet by mouth daily. 8/25/21  Yes Quirino Stewart MD   colchicine 0.6 mg tablet TAKE ONE (1) TABLET BY MOUTH EVERY DAY   4/8/21  Yes Ирина Sam NP   rosuvastatin (CRESTOR) 10 mg tablet TAKE 1 TABLET BY MOUTH EVERY NIGHT 2/2/21  Yes Brannon Boyer NP   co-enzyme Q-10 (Co Q-10) 100 mg capsule Take 100 mg by mouth daily. Yes Provider, Historical   aspirin 81 mg chewable tablet Take 1 Tab by mouth daily. 1/24/20  Yes Gokul Castillo MD   metoprolol tartrate (LOPRESSOR) 50 mg tablet TAKE 1 TABLET BY MOUTH TWICE DAILY  Patient not taking: Reported on 8/16/2021 10/7/20 9/24/21  Gabby Jaimes MD   omega 0-gok-yyg-fish oil 60- mg cap Take 500 mg by mouth. Patient not taking: Reported on 9/24/2021 9/24/21  Provider, Historical         Visit Vitals  BP (!) 148/70   Pulse 68   Ht 5' 10\" (1.778 m)   Wt 121.1 kg (267 lb)   BMI 38.31 kg/m²         Physical Exam  Vitals and nursing note reviewed. Constitutional:       Appearance: He is well-developed. HENT:      Head: Normocephalic and atraumatic. Eyes:      Conjunctiva/sclera: Conjunctivae normal.   Neck:      Thyroid: No thyromegaly. Vascular: No JVD. Trachea: No tracheal deviation. Cardiovascular:      Rate and Rhythm: Normal rate. Rhythm irregular. Heart sounds: Normal heart sounds. No murmur heard. No friction rub. No gallop. Pulmonary:      Effort: No respiratory distress. Breath sounds: Normal breath sounds. No wheezing or rales. Chest:      Chest wall: No tenderness. Abdominal:      Palpations: Abdomen is soft. Tenderness: There is no abdominal tenderness. Musculoskeletal:      Cervical back: Neck supple. Right lower leg: Edema (2+) present. Left lower leg: Edema (2+) present. Skin:     General: Skin is warm and dry. Neurological:      Mental Status: He is alert and oriented to person, place, and time.           Linda Porter has a reminder for a \"due or due soon\" health maintenance. I have asked that he contact his primary care provider for follow-up on this health maintenance. SUMMARY:echo-2017  Left ventricle: Systolic function was normal. Ejection fraction was  estimated in the range of 55 % to 60 %. There were no regional wall motion  abnormalities. There was moderate concentric hypertrophy. COMPARISONS:  Comparison was made with the previous study of 10-Santhosh-2013. LV thickness  has increased. NUCLEAR IMAGIN2017     Findings:   1. Stress images reveal normal Myoview distrubution in all the LV segments in short axis, vertical and horizontal long axis views. 2. Resting images have a normal uptake. 3. Gated images reveal normal wall motion and the ejection fraction is calculated to be 74%. Conclusion:   1. Normal perfusion scan. 2. Normal wall motion and ejection fraction. 3. Low risk scan. Procedure Conclusion 2020    Nuclear Stress Test     Nuclear Cardiac Spect Rest then Gated Stress study. Lark Brood was used as the stressing method and agent. One day myocardial perfusion study. Date: 2020. Negative myocardial perfusion imaging. Myocardial perfusion imaging supports a low risk stress test.   There is a prior study available for comparison. .     2020  Event monitor- frequent paroxysmal atrial fibrillation flutter      Interpretation Summary 2020    · LV: Normal cavity size and systolic function (ejection fraction normal). Mild concentric hypertrophy. Estimated left ventricular ejection fraction is 55 - 60%. Wall motion: normal. Moderate (grade 2) left ventricular diastolic dysfunction. · RV: Mildly dilated right ventricle. · RA: Mildly dilated right atrium. · MV: Mitral valve thickening. Mitral annular calcification. · Pericardium: Pericardial fat pad present. No evidence of pericardial effusion. Assessment         ICD-10-CM ICD-9-CM    1.  SOBOE (shortness of breath on exertion)  R06.02 786.05 AMB POC EKG ROUTINE W/ 12 LEADS, INTER & REP      furosemide (LASIX) 20 mg tablet      METABOLIC PANEL, COMPREHENSIVE      CBC W/O DIFF      ECHO ADULT COMPLETE    No significant cardiac etiology noted no pericardial effusion. Normal ejection fraction enlarged right side likely related to MELANI. 2. Acute pericarditis, unspecified type  I30.9 420.90 CBC W/O DIFF    Pericardial fluid no malignant cell found. pericardial biopsy analysis showing fibrinous pericarditis. 3. Paroxysmal atrial fibrillation (HCC)  I48.0 427.31     Stable monitor continue treatment   4. CAD S/P percutaneous coronary angioplasty  I25.10 414.01     Z98.61 V45.82     Stable. Negative nuclear stress test   5. MELANI (obstructive sleep apnea)  G47.33 327.23     Continue CPAP   6. Dyslipidemia  E78.5 272.4     Continue statin lab with PCP   7. Diastolic CHF, acute on chronic [facility])  I50.33 428.33      428.0    7/2020  Shortness of breath on exertion unclear etiology history of paroxysmal A. fib. Restarted Eliquis. Negative stress test normal ejection fraction enlarged right-sided chambers on echo. Evaluate with pulmonary for sleep apnea as well as shortness of breath.  9/2021  Patient presents with complaints of shortness of breath and bilateral lower extremity edema. He has history of pericardial effusion. He is unsure regarding many of his medications. Of note he is no longer taking metoprolol or Eliquis and it is unclear why. Today's EKG SR with PVC. Will begin Lasix 20 mg/day and check BMP in 1 week. Recent BMP CBC reviewed and discussed with patient. Noted abnormal liver enzymes with elevated bilirubin. Also noted decreased platelets. Advised to follow-up with GI soon as possible. Will evaluate for repeat pericardial effusion with bedside echo. Will wait for follow-up CBC to evaluate resuming Eliquis if hemoglobin and platelet count are stable.     Medications Discontinued During This Encounter Medication Reason    metoprolol tartrate (LOPRESSOR) 50 mg tablet Side Effects    omega 3-dha-epa-fish oil 60- mg cap LIST CLEANUP       Orders Placed This Encounter    METABOLIC PANEL, COMPREHENSIVE     Standing Status:   Future     Standing Expiration Date:   9/25/2022    CBC W/O DIFF     Standing Status:   Future     Standing Expiration Date:   9/25/2022    AMB POC EKG ROUTINE W/ 12 LEADS, INTER & REP     Order Specific Question:   Reason for Exam:     Answer:   SOB    ECHO ADULT COMPLETE     Standing Status:   Future     Standing Expiration Date:   9/24/2022     Scheduling Instructions:      Please schedule for [place]     Order Specific Question:   Contrast Enhancement (Bubble Study, Definity, Optison) may be used if criteria listed in established evidence-based protocol has been identified. Answer:   Yes     Order Specific Question:   Enhanced Imaging (Myocardial Strain, 3D post-processing) may be used if criteria listed in established evidence-based protocol has been identified. Answer: Yes    furosemide (LASIX) 20 mg tablet     Sig: Take 1 Tablet by mouth daily. Dispense:  90 Tablet     Refill:  2       Follow-up and Dispositions    · Return in about 6 weeks (around 11/5/2021) for Post testing.

## 2021-09-24 NOTE — PATIENT INSTRUCTIONS
Begin lasix 20 mg / day    Have blood work drawn    Low sodium diet         Low Sodium Diet (2,000 Milligram): Care Instructions  Overview     Limiting sodium can be an important part of managing some health problems. The most common source of sodium is salt. People get most of the salt in their diet from canned, prepared, and packaged foods. Fast food and restaurant meals also are very high in sodium. Your doctor will probably limit your sodium to less than 2,000 milligrams (mg) a day. This limit counts all the sodium in prepared and packaged foods and any salt you add to your food. Follow-up care is a key part of your treatment and safety. Be sure to make and go to all appointments, and call your doctor if you are having problems. It's also a good idea to know your test results and keep a list of the medicines you take. How can you care for yourself at home? Read food labels  · Read labels on cans and food packages. The labels tell you how much sodium is in each serving. Make sure that you look at the serving size. If you eat more than the serving size, you have eaten more sodium. · Food labels also tell you the Percent Daily Value for sodium. Choose products with low Percent Daily Values for sodium. · Be aware that sodium can come in forms other than salt, including monosodium glutamate (MSG), sodium citrate, and sodium bicarbonate (baking soda). MSG is often added to Asian food. When you eat out, you can sometimes ask for food without MSG or added salt. Buy low-sodium foods  · Buy foods that are labeled \"unsalted\" (no salt added), \"sodium-free\" (less than 5 mg of sodium per serving), or \"low-sodium\" (140 mg or less of sodium per serving). Foods labeled \"reduced-sodium\" and \"light sodium\" may still have too much sodium. Be sure to read the label to see how much sodium you are getting. · Buy fresh vegetables, or frozen vegetables without added sauces.  Buy low-sodium versions of canned vegetables, soups, and other canned goods. Prepare low-sodium meals  · Cut back on the amount of salt you use in cooking. This will help you adjust to the taste. Do not add salt after cooking. One teaspoon of salt has about 2,300 mg of sodium. · Take the salt shaker off the table. · Flavor your food with garlic, lemon juice, onion, vinegar, herbs, and spices. Do not use soy sauce, lite soy sauce, steak sauce, onion salt, garlic salt, celery salt, or ketchup on your food. · Use low-sodium salad dressings, sauces, and ketchup. Or make your own salad dressings and sauces without adding salt. · Use less salt (or none) when recipes call for it. You can often use half the salt a recipe calls for without losing flavor. Other foods such as rice, pasta, and grains do not need added salt. · Rinse canned vegetables, and cook them in fresh water. This removes some--but not all--of the salt. · Avoid water that is naturally high in sodium or that has been treated with water softeners, which add sodium. If you buy bottled water, read the label and choose a sodium-free brand. Avoid high-sodium foods  · Avoid eating:  ? Smoked, cured, salted, and canned meat, fish, and poultry. ? Ham, terry, hot dogs, and luncheon meats. ? Regular, hard, and processed cheese and regular peanut butter. ? Crackers with salted tops, and other salted snack foods such as pretzels, chips, and salted popcorn. ? Frozen prepared meals, unless labeled low-sodium. ? Canned and dried soups, broths, and bouillon, unless labeled sodium-free or low-sodium. ? Canned vegetables, unless labeled sodium-free or low-sodium. ? [employer] fries, pizza, tacos, and other fast foods. ? Pickles, olives, ketchup, and other condiments, especially soy sauce, unless labeled sodium-free or low-sodium. Where can you learn more?   Go to http://www.gray.com/  Enter P747 in the search box to learn more about \"Low Sodium Diet (2,000 Milligram): Care Instructions. \"  Current as of: December 17, 2020               Content Version: 13.0  © 0205-8239 [employer], [facility]. Care instructions adapted under license by [employer] (which disclaims liability or warranty for this information). If you have questions about a medical condition or this instruction, always ask your healthcare professional. [place] [address] any warranty or liability for your use of this information.

## 2021-09-24 NOTE — PROGRESS NOTES
1. Have you been to the ER, urgent care clinic since your last visit? Hospitalized since your last visit? No    2. Have you seen or consulted any other health care providers outside of the [address] [place] [address] since your last visit? Include any pap smears or colon screening.       No

## 2021-09-30 ENCOUNTER — HOSPITAL ENCOUNTER (OUTPATIENT)
Dept: LAB | Age: 66
Discharge: HOME | End: 2021-09-30
Payer: MEDICARE

## 2021-09-30 DIAGNOSIS — I30.9 ACUTE PERICARDITIS, UNSPECIFIED TYPE: ICD-10-CM

## 2021-09-30 DIAGNOSIS — R06.02 SOBOE (SHORTNESS OF BREATH ON EXERTION): ICD-10-CM

## 2021-09-30 LAB
ALBUMIN SERPL-MCNC: 3.9 G/DL (ref 3.5–4.7)
ALBUMIN/GLOB SERPL: 1.3 {RATIO}
ALP SERPL-CCNC: 144 U/L (ref 38–126)
ALT SERPL-CCNC: 102 U/L (ref 3–72)
ANION GAP SERPL CALC-SCNC: 9 MMOL/L
AST SERPL W P-5'-P-CCNC: 107 U/L (ref 17–74)
BILIRUB SERPL-MCNC: 2.2 MG/DL (ref 0.2–1)
BUN SERPL-MCNC: 19 MG/DL (ref 9–21)
BUN/CREAT SERPL: 19
CA-I BLD-MCNC: 8.8 MG/DL (ref 8.5–10.5)
CHLORIDE SERPL-SCNC: 107 MMOL/L (ref 94–111)
CO2 SERPL-SCNC: 21 MMOL/L (ref 21–33)
CREAT SERPL-MCNC: 1 MG/DL (ref 0.8–1.5)
ERYTHROCYTE [DISTWIDTH] IN BLOOD BY AUTOMATED COUNT: 12.4 % (ref 11.6–14.5)
GLOBULIN SER CALC-MCNC: 3.1 G/DL
GLUCOSE SERPL-MCNC: 95 MG/DL (ref 70–110)
HCT VFR BLD AUTO: 45.6 % (ref 36–48)
HGB BLD-MCNC: 16.3 G/DL (ref 13–16)
MCH RBC QN AUTO: 34.5 PG (ref 24–34)
MCHC RBC AUTO-ENTMCNC: 35.7 G/DL (ref 31–37)
MCV RBC AUTO: 96.6 FL (ref 78–100)
NRBC # BLD: 0 K/UL (ref 0–0.01)
NRBC BLD-RTO: 0 PER 100 WBC
PLATELET # BLD AUTO: 82 K/UL (ref 135–420)
PMV BLD AUTO: 10.7 FL (ref 9.2–11.8)
POTASSIUM SERPL-SCNC: 4 MMOL/L (ref 3.2–5.1)
PROT SERPL-MCNC: 7 G/DL (ref 6.1–8.4)
RBC # BLD AUTO: 4.72 M/UL (ref 4.35–5.65)
SODIUM SERPL-SCNC: 137 MMOL/L (ref 135–145)
WBC # BLD AUTO: 5.5 K/UL (ref 4.6–13.2)

## 2021-09-30 PROCEDURE — 85027 COMPLETE CBC AUTOMATED: CPT

## 2021-09-30 PROCEDURE — 80053 COMPREHEN METABOLIC PANEL: CPT

## 2021-09-30 PROCEDURE — 36415 COLL VENOUS BLD VENIPUNCTURE: CPT

## 2021-10-01 NOTE — PROGRESS NOTES
Is let patient know liver enzymes remain elevated as well as bilirubin, and decrease in platelets. To follow-up with liver specialist as soon as possible.   Renal function is stable at this time

## 2021-10-06 ENCOUNTER — TELEPHONE (OUTPATIENT)
Dept: CARDIOLOGY CLINIC | Age: 66
End: 2021-10-06

## 2021-10-06 NOTE — TELEPHONE ENCOUNTER
Spoke to patient to patient per Belle [place] NP regarding labs, instructions was given and patient states he saw Liver doctor on Monday and she is aware of his labs. He voices understanding and acceptance of this advice and will call back if any further questions or concerns.

## 2021-10-06 NOTE — TELEPHONE ENCOUNTER
----- Message from Gage Rojas NP sent at 10/1/2021  2:44 PM EDT -----  Is let patient know liver enzymes remain elevated as well as bilirubin, and decrease in platelets. To follow-up with liver specialist as soon as possible.   Renal function is stable at this time

## 2021-10-19 ENCOUNTER — HOSPITAL ENCOUNTER (OUTPATIENT)
Dept: NON INVASIVE DIAGNOSTICS | Age: 66
Discharge: HOME | End: 2021-10-19
Payer: MEDICARE

## 2021-10-19 VITALS
HEIGHT: 70 IN | BODY MASS INDEX: 38.22 KG/M2 | WEIGHT: 267 LBS | DIASTOLIC BLOOD PRESSURE: 70 MMHG | SYSTOLIC BLOOD PRESSURE: 144 MMHG

## 2021-10-19 DIAGNOSIS — R06.02 SOBOE (SHORTNESS OF BREATH ON EXERTION): ICD-10-CM

## 2021-10-19 LAB
ECHO AO ASC DIAM: 3.4 CM
ECHO AO ROOT DIAM: 3.2 CM
ECHO AV AREA PEAK VELOCITY: 3.47 CM2
ECHO AV AREA PLAN/BSA: 1.33
ECHO AV AREA VTI: 3.15 CM2
ECHO AV AREA/BSA PEAK VELOCITY: 1.5 CM2/M2
ECHO AV AREA/BSA VTI: 1.3 CM2/M2
ECHO AV CUSP MM: 1.9 CM
ECHO AV MEAN GRADIENT: 5 MMHG
ECHO AV MEAN VELOCITY: 107 CM/S
ECHO AV PEAK GRADIENT: 9 MMHG
ECHO AV PEAK VELOCITY: 151 CM/S
ECHO AV VTI: 31.5 CM
ECHO EST RA PRESSURE: 3 MMHG
ECHO LA AREA 2C: 19.2 CM2
ECHO LA AREA 4C: 22.1 CM2
ECHO LA MAJOR AXIS: 4.6 CM
ECHO LA MAJOR AXIS: 6 CM
ECHO LA TO AORTIC ROOT RATIO: 1.44
ECHO LV E' LATERAL VELOCITY: 13.6 CM/S
ECHO LV E' SEPTAL VELOCITY: 6.42 CM/S
ECHO LV EDV A2C: 141 CM3
ECHO LV EJECTION FRACTION BIPLANE: 63.5 % (ref 55–100)
ECHO LV ESV A2C: 39.3 CM3
ECHO LV INTERNAL DIMENSION DIASTOLIC: 5.2 CM (ref 4.2–5.9)
ECHO LV INTERNAL DIMENSION SYSTOLIC: 3.4 CM
ECHO LV IVSD: 1.1 CM (ref 0.6–1)
ECHO LV MASS 2D: 207.3 G (ref 88–224)
ECHO LV MASS INDEX 2D: 87.8 G/M2 (ref 49–115)
ECHO LV POSTERIOR WALL DIASTOLIC: 1 CM (ref 0.6–1)
ECHO LVOT DIAM: 2.2 CM
ECHO LVOT PEAK GRADIENT: 8 MMHG
ECHO LVOT PEAK VELOCITY: 138 CM/S
ECHO LVOT SV: 99 CM3
ECHO LVOT VTI: 24.3 CM
ECHO LVOT VTI: 26.1 CM
ECHO LVOT VTI: 27.9 CM
ECHO MV A VELOCITY: 77.6 CM/S
ECHO MV AREA PHT: 3.14 CM2
ECHO MV E DECELERATION TIME (DT): 239 MS
ECHO MV E VELOCITY: 107 CM/S
ECHO MV E/A RATIO: 1.38
ECHO MV E/E' LATERAL: 7.87
ECHO MV E/E' RATIO (AVERAGED): 12.27
ECHO MV E/E' SEPTAL: 16.67
ECHO MV MAX VELOCITY: 91.8 CM/S
ECHO MV MEAN GRADIENT: 2 MMHG
ECHO MV PEAK GRADIENT: 3 MMHG
ECHO MV PRESSURE HALF TIME (PHT): 70 MS
ECHO MV VTI: 24.3 CM
ECHO RA AREA 4C: 21.7 CM2
ECHO RA MAJOR AXIS: 5.79 CM
ECHO RA MINOR AXIS: 4.9 CM
ECHO RIGHT VENTRICULAR SYSTOLIC PRESSURE (RVSP): 25 MMHG
ECHO RV INTERNAL DIMENSION: 3.3 CM
ECHO RV TAPSE: 1.93 CM (ref 1.5–2)
ECHO TV MEAN GRADIENT: 22 MMHG
ECHO TV REGURGITANT MAX VELOCITY: 233 CM/S
LA VOL DISK BP: 65.7 CM3 (ref 18–58)
LVOT MG: 4 MMHG
MV DEC SLOPE: 4480 MM/S2
MV DEC SLOPE: 4480 MM/S2

## 2021-10-19 PROCEDURE — 93306 TTE W/DOPPLER COMPLETE: CPT

## 2021-11-15 ENCOUNTER — OFFICE VISIT (OUTPATIENT)
Dept: CARDIOLOGY CLINIC | Age: 66
End: 2021-11-15
Payer: MEDICARE

## 2021-11-15 VITALS
OXYGEN SATURATION: 96 % | WEIGHT: 269 LBS | SYSTOLIC BLOOD PRESSURE: 146 MMHG | BODY MASS INDEX: 38.51 KG/M2 | HEIGHT: 70 IN | DIASTOLIC BLOOD PRESSURE: 65 MMHG | HEART RATE: 72 BPM

## 2021-11-15 DIAGNOSIS — G47.33 OSA (OBSTRUCTIVE SLEEP APNEA): ICD-10-CM

## 2021-11-15 DIAGNOSIS — I48.0 PAROXYSMAL ATRIAL FIBRILLATION (HCC): ICD-10-CM

## 2021-11-15 DIAGNOSIS — B18.2 CHRONIC HEPATITIS C WITHOUT HEPATIC COMA (HCC): ICD-10-CM

## 2021-11-15 DIAGNOSIS — E78.5 DYSLIPIDEMIA: ICD-10-CM

## 2021-11-15 DIAGNOSIS — Z98.61 CAD S/P PERCUTANEOUS CORONARY ANGIOPLASTY: ICD-10-CM

## 2021-11-15 DIAGNOSIS — I30.9 ACUTE PERICARDITIS, UNSPECIFIED TYPE: ICD-10-CM

## 2021-11-15 DIAGNOSIS — I31.39 PERICARDIAL EFFUSION WITH CARDIAC TAMPONADE: Primary | ICD-10-CM

## 2021-11-15 DIAGNOSIS — I25.10 CAD S/P PERCUTANEOUS CORONARY ANGIOPLASTY: ICD-10-CM

## 2021-11-15 DIAGNOSIS — I10 ESSENTIAL HYPERTENSION: ICD-10-CM

## 2021-11-15 DIAGNOSIS — I31.4 PERICARDIAL EFFUSION WITH CARDIAC TAMPONADE: Primary | ICD-10-CM

## 2021-11-15 PROCEDURE — 99214 OFFICE O/P EST MOD 30 MIN: CPT | Performed by: NURSE PRACTITIONER

## 2021-11-15 RX ORDER — LOSARTAN POTASSIUM 100 MG/1
100 TABLET ORAL DAILY
Qty: 30 TABLET | Refills: 5 | Status: SHIPPED | OUTPATIENT
Start: 2021-11-15 | End: 2022-09-19

## 2021-11-15 NOTE — PATIENT INSTRUCTIONS
Resume aspirin 81 mg / day     Heart-Healthy Diet: Care Instructions  Your Care Instructions     A heart-healthy diet has lots of vegetables, fruits, nuts, beans, and whole grains, and is low in salt. It limits foods that are high in saturated fat, such as meats, cheeses, and fried foods. It may be hard to change your diet, but even small changes can lower your risk of heart attack and heart disease. Follow-up care is a key part of your treatment and safety. Be sure to make and go to all appointments, and call your doctor if you are having problems. It's also a good idea to know your test results and keep a list of the medicines you take. How can you care for yourself at home? Watch your portions  · Use food labels to learn what the recommended servings are for the foods you eat. · Eat only the number of calories you need to stay at a healthy weight. If you need to lose weight, eat fewer calories than your body burns (through exercise and other physical activity). Eat more fruits and vegetables  · Eat a variety of fruit and vegetables every day. Dark green, deep orange, red, or yellow fruits and vegetables are especially good for you. Examples include spinach, carrots, peaches, and berries. · Keep carrots, celery, and other veggies handy for snacks. Buy fruit that is in season and store it where you can see it so that you will be tempted to eat it. · Cook dishes that have a lot of veggies in them, such as stir-fries and soups. Limit saturated fat  · Read food labels, and try to avoid saturated fats. They increase your risk of heart disease. · Use olive or canola oil when you cook. · Bake, broil, grill, or steam foods instead of frying them. · Choose lean meats instead of high-fat meats such as hot dogs and sausages. Cut off all visible fat when you prepare meat. · Eat fish, skinless poultry, and meat alternatives such as soy products instead of high-fat meats.  Soy products, such as tofu, may be especially good for your heart. · Choose low-fat or fat-free milk and dairy products. Eat foods high in fiber  · Eat a variety of grain products every day. Include whole-grain foods that have lots of fiber and nutrients. Examples of whole-grain foods include oats, whole wheat bread, and brown rice. · Buy whole-grain breads and cereals, instead of white bread or pastries. Limit salt and sodium  · Limit how much salt and sodium you eat to help lower your blood pressure. · Taste food before you salt it. Add only a little salt when you think you need it. With time, your taste buds will adjust to less salt. · Eat fewer snack items, fast foods, and other high-salt, processed foods. Check food labels for the amount of sodium in packaged foods. · Choose low-sodium versions of canned goods (such as soups, vegetables, and beans). Limit sugar  · Limit drinks and foods with added sugar. These include candy, desserts, and soda pop. Limit alcohol  · Limit alcohol to no more than 2 drinks a day for men and 1 drink a day for women. Too much alcohol can cause health problems. When should you call for help? Watch closely for changes in your health, and be sure to contact your doctor if:    · You would like help planning heart-healthy meals. Where can you learn more? Go to http://www.aguillon.com/  Enter V137 in the search box to learn more about \"Heart-Healthy Diet: Care Instructions. \"  Current as of: December 17, 2020               Content Version: 13.0  © 2006-2021 [employer]. Care instructions adapted under license by [employer] (which disclaims liability or warranty for this information). If you have questions about a medical condition or this instruction, always ask your healthcare professional. [place] [address] any warranty or liability for your use of this information.

## 2021-11-15 NOTE — PROGRESS NOTES
HISTORY OF PRESENT ILLNESS  Jenn Johns is a 73 y.o. male. 1/2020. Patient seen in office today with increasing shortness of breath started about a week ago which is progressive associated with orthopnea PND. Denies any chest pain but he is short of breath on minimal activity. He has noted palpitation. 9/2021  Patient presents with complaints of shortness of breath and increased bilateral lower extremity edema. He reports this has been intermittent over the past few months. He also complains of occasional palpitations. He denies chest pain or orthopnea. He reports stopped taking metoprolol several months ago because he thought this was contributing to his depression. He is no longer taking Eliquis and is unsure why.    11/2021  Patient presents to f/u for shortness of breath and echocardiogram results. He reports SOB has improved since taking lasix he reports was recently seen by GI and has EGD scheduled in December. He denies chest pain, palpitations, or edema. He continues to drink approximately three alcoholic beverages daily during the week and six daily on weekends. He is no longer taking aspirin, reports stopped taking because he did not think he need any longer. Follow-up  The history is provided by the patient and medical records. Pertinent negatives include no chest pain, no abdominal pain, no headaches and no shortness of breath. Shortness of Breath  The history is provided by the patient. This is a new problem. The problem occurs frequently. The problem has been gradually worsening. Pertinent negatives include no fever, no headaches, no cough, no sputum production, no hemoptysis, no wheezing, no PND, no orthopnea, no chest pain, no vomiting, no abdominal pain, no rash, no leg swelling and no claudication. Precipitated by: exertion. He has tried nothing for the symptoms. Associated medical issues include CAD. Associated medical issues do not include COPD or chronic lung disease. Cholesterol Problem  The history is provided by the patient. This is a chronic problem. The problem occurs constantly. The problem has not changed since onset. Pertinent negatives include no chest pain, no abdominal pain, no headaches and no shortness of breath. Review of Systems   Constitutional: Negative for chills and fever. HENT: Negative for nosebleeds. Eyes: Negative for blurred vision and double vision. Respiratory: Negative for cough, hemoptysis, sputum production, shortness of breath and wheezing. Cardiovascular: Negative for chest pain, palpitations, orthopnea, claudication, leg swelling and PND. Gastrointestinal: Negative for abdominal pain, heartburn, nausea and vomiting. Musculoskeletal: Negative for myalgias. Skin: Negative for rash. Neurological: Negative for dizziness, weakness and headaches. Endo/Heme/Allergies: Does not bruise/bleed easily.      Family History   Problem Relation Age of Onset    Heart Disease Father     Cancer Mother        Past Medical History:   Diagnosis Date    Atrial fibrillation ([facility] [address] 2020    Coronary artery disease     Coronary atherosclerosis of native coronary artery     Stable, no recent angina    ED (erectile dysfunction)     Elevated blood pressure reading without diagnosis of hypertension     Monitor     History of hepatitis C     treated with interferon    Obstructive sleep apnea 2020    MELANI on CPAP     Other and unspecified hyperlipidemia     Paroxysmal atrial fibrillation ([address] 2020    Platelet inhibition due to Plavix     on Eliquis    Postsurgical percutaneous transluminal coronary angioplasty status     Eight years since stent, no recent stent testing, EKG normal       Past Surgical History:   Procedure Laterality Date    HX CORONARY STENT PLACEMENT      HX HEART VALVE SURGERY         Social History     Tobacco Use    Smoking status: Former Smoker     Quit date:      Years since quittin.8    Smokeless tobacco: Former User    Tobacco comment: Remotely quit tobacco use 2010   Substance Use Topics    Alcohol use: Yes     Comment: Occasional       No Known Allergies    Prior to Admission medications    Medication Sig Start Date End Date Taking? Authorizing Provider   losartan (COZAAR) 100 mg tablet Take 1 Tablet by mouth daily. 11/15/21  Yes Brannon Boyer NP   tadalafiL (Cialis) 2.5 mg tablet Take 2.5 mg by mouth. Yes Provider, Historical   furosemide (LASIX) 20 mg tablet Take 1 Tablet by mouth daily. 9/24/21  Yes Brannon Boyer NP   colchicine 0.6 mg tablet TAKE ONE (1) TABLET BY MOUTH EVERY DAY   4/8/21  Yes Ирина Sam NP   rosuvastatin (CRESTOR) 10 mg tablet TAKE 1 TABLET BY MOUTH EVERY NIGHT 2/2/21  Yes Brannon Boyer NP   co-enzyme Q-10 (Co Q-10) 100 mg capsule Take 100 mg by mouth daily. Yes Provider, Historical   losartan (COZAAR) 50 mg tablet Take 1 Tablet by mouth daily. 8/25/21 11/15/21  Betty Reyes MD   aspirin 81 mg chewable tablet Take 1 Tab by mouth daily. Patient not taking: Reported on 11/15/2021 1/24/20   Issac Busby MD         Visit Vitals  BP (!) 146/65 (BP 1 Location: Left upper arm, BP Patient Position: Sitting, BP Cuff Size: Adult)   Pulse 72   Ht 5' 10\" (1.778 m)   Wt 122 kg (269 lb)   SpO2 96%   BMI 38.60 kg/m²         Physical Exam  Vitals and nursing note reviewed. Constitutional:       Appearance: He is well-developed. HENT:      Head: Normocephalic and atraumatic. Eyes:      Conjunctiva/sclera: Conjunctivae normal.   Neck:      Thyroid: No thyromegaly. Vascular: No JVD. Trachea: No tracheal deviation. Cardiovascular:      Rate and Rhythm: Normal rate. Rhythm irregular. Heart sounds: Normal heart sounds. No murmur heard. No friction rub. No gallop. Pulmonary:      Effort: No respiratory distress. Breath sounds: Normal breath sounds. No wheezing or rales. Chest:      Chest wall: No tenderness.    Abdominal: Palpations: Abdomen is soft. Tenderness: There is no abdominal tenderness. Musculoskeletal:      Cervical back: Neck supple. Right lower leg: No edema. Left lower leg: No edema. Skin:     General: Skin is warm and dry. Neurological:      Mental Status: He is alert and oriented to person, place, and time. Mr. Leila Starks has a reminder for a \"due or due soon\" health maintenance. I have asked that he contact his primary care provider for follow-up on this health maintenance. SUMMARY:echo-2017  Left ventricle: Systolic function was normal. Ejection fraction was  estimated in the range of 55 % to 60 %. There were no regional wall motion  abnormalities. There was moderate concentric hypertrophy. COMPARISONS:  Comparison was made with the previous study of 10-Santhosh-2013. LV thickness  has increased. NUCLEAR IMAGIN2017     Findings:   1. Stress images reveal normal Myoview distrubution in all the LV segments in short axis, vertical and horizontal long axis views. 2. Resting images have a normal uptake. 3. Gated images reveal normal wall motion and the ejection fraction is calculated to be 74%. Conclusion:   1. Normal perfusion scan. 2. Normal wall motion and ejection fraction. 3. Low risk scan. Procedure Conclusion 2020    Nuclear Stress Test     Nuclear Cardiac Spect Rest then Gated Stress study. Peralta Josefina was used as the stressing method and agent. One day myocardial perfusion study. Date: 2020. Negative myocardial perfusion imaging. Myocardial perfusion imaging supports a low risk stress test.   There is a prior study available for comparison. .     2020  Event monitor- frequent paroxysmal atrial fibrillation flutter      Interpretation Summary 2020    · LV: Normal cavity size and systolic function (ejection fraction normal). Mild concentric hypertrophy. Estimated left ventricular ejection fraction is 55 - 60%.  Wall motion: normal. Moderate (grade 2) left ventricular diastolic dysfunction. · RV: Mildly dilated right ventricle. · RA: Mildly dilated right atrium. · MV: Mitral valve thickening. Mitral annular calcification. · Pericardium: Pericardial fat pad present. No evidence of pericardial effusion. Echo 10/2021  Interpretation Summary  · LV: Calculated LVEF is 64%. Biplane method used to measure ejection fraction. Normal cavity size and systolic function (ejection fraction normal). Increased wall thickness. Inconclusive left ventricular diastolic function. · LA: Mildly dilated left atrium. · RA: Mildly dilated right atrium. · MV: Mitral valve thickening. · TV: Mild tricuspid valve regurgitation is present. Right Ventricular Arterial Pressure (RVSP) is 25 mmHg. Pulmonary hypertension not suggested by Doppler findings. · PA: Pulmonary arterial systolic pressure is 25 mmHg. Assessment         ICD-10-CM ICD-9-CM    1. Pericardial effusion with cardiac tamponade  I31.3 423.9     I31.4 423.3     No significant cardiac etiology noted no pericardial effusion. Normal ejection fraction enlarged right side likely related to MELANI. 2. Paroxysmal atrial fibrillation (HCC)  I48.0 427.31 REFERRAL TO CARDIOTHORACIC SURGEON    Stable monitor continue treatment   3. CAD S/P percutaneous coronary angioplasty  I25.10 414.01     Z98.61 V45.82     Stable. Negative nuclear stress test   4. Acute pericarditis, unspecified type  I30.9 420.90     Pericardial fluid no malignant cell found. pericardial biopsy analysis showing fibrinous pericarditis. 5. Dyslipidemia  E78.5 272.4     continue statin, lab with PCP   6. MELANI (obstructive sleep apnea)  G47.33 327.23     Continue CPAP   7. Chronic hepatitis C without hepatic coma (HCC)  B18.2 070.54    8. Essential hypertension  I10 401.9 losartan (COZAAR) 100 mg tablet      METABOLIC PANEL, BASIC   1/1554  Shortness of breath on exertion unclear etiology history of paroxysmal A. fib. Restarted Eliquis.   Negative stress test normal ejection fraction enlarged right-sided chambers on echo. Evaluate with pulmonary for sleep apnea as well as shortness of breath.  9/2021  Patient presents with complaints of shortness of breath and bilateral lower extremity edema. He has history of pericardial effusion. He is unsure regarding many of his medications. Of note he is no longer taking metoprolol or Eliquis and it is unclear why. Today's EKG SR with PVC. Will begin Lasix 20 mg/day and check BMP in 1 week. Recent BMP CBC reviewed and discussed with patient. Noted abnormal liver enzymes with elevated bilirubin. Also noted decreased platelets. Advised to follow-up with GI soon as possible. Will evaluate for repeat pericardial effusion with bedside echo. Will wait for follow-up CBC to evaluate resuming Eliquis if hemoglobin and platelet count are stable. 11/2021  SOB and edema has improved with lasix. Recent echocardiogram reviewed and discussed with patient, normal LF function, no pericardial effusion visualized. B/P elevated - will increased losartan to 100 mg / day - BMP in 1 week. Patient with h/o Hep C and cirrhosis - scheduled for EGD in December. H/o PAF - not a candidate for long term AC due to decreased platelets, and varices. Will refer for consideration for watchman device. Strongly encouraged to limit alcohol intake. Above plan discussed with patient - he verbalized understanding and is amenable to plan.   Medications Discontinued During This Encounter   Medication Reason    losartan (COZAAR) 50 mg tablet DOSE ADJUSTMENT       Orders Placed This Encounter    METABOLIC PANEL, BASIC     Standing Status:   Future     Standing Expiration Date:   11/16/2022    REFERRAL TO CARDIOTHORACIC SURGEON     Standing Status:   Future     Standing Expiration Date:   5/15/2022     Referral Priority:   Routine     Referral Type:   Consultation     Referral Reason:   Specialty Services Required     Referred to Provider:   [facility], Tori Sifuentes MD     Requested Specialty:   Cardiothoracic Surgery     Number of Visits Requested:   1    losartan (COZAAR) 100 mg tablet     Sig: Take 1 Tablet by mouth daily. Dispense:  30 Tablet     Refill:  5       Follow-up and Dispositions    · Return in about 6 months (around 5/15/2022) for Follow up with Dr. Barroso Forward.

## 2021-11-15 NOTE — PROGRESS NOTES
1. Have you been to the ER, urgent care clinic since your last visit? Hospitalized since your last visit? No    2. Have you seen or consulted any other health care providers outside of the [address] [place] [address] since your last visit? Include any pap smears or colon screening.  No

## 2021-11-16 NOTE — PROGRESS NOTES
Discharge planned for today. Discussed HH orders with pt and pt has declined HH. Dr. Fontana Delay notified. Spouse to transport home. No other needs at this time.  Merlin Bridges, -[phone] In the ED today, pt recieved 1 dose of 25 mg meclizine and 1 dose of 10 mg reglan and 1 dose of 1 mg ativan, of which only Ativan helped her. Pt denies any hearing loss, sinus problems, recent infectious/sinus congestion but admits to tinnitus in R ear and R ear fullness. No hx of CVA or cancer. Pt denies any numbness, tingling or weakness. During pt's last episode of dizziness 2 years ago when she had 3 months of vertigo, she was referred out for vestibular therapy which did not help her at all. CT brain: No acute intracranial hemorrhage, vasogenic edema or extra-axial collection. Stable exam. CTA neck: No vessel occlusion occlusion or dissection. Tortuous internal carotid arteries with mild stenosis of the proximal cervical segment. No hemodynamically significant stenosis by NASCET criteria.Right thyroid lobe hypodensity measuring 1 cm. Recommend nonemergent thyroid ultrasound for further evaluation. In the ED today, pt recieved 1 dose of 25 mg meclizine and 1 dose of 10 mg reglan and 1 dose of 1 mg ativan, of which only Ativan helped her. Pt denies any hearing loss, sinus problems, recent infectious/sinus congestion but admits to tinnitus in R ear and R ear fullness. No hx of CVA or cancer. Pt denies any numbness, tingling or weakness. During pt's last episode of dizziness 2 years ago when she had 3 months of vertigo, she was referred out for vestibular therapy which did not help her at all. CT brain: No acute intracranial hemorrhage, vasogenic edema or extra-axial collection. Stable exam. CTA neck: No vessel occlusion occlusion or dissection. Tortuous internal carotid arteries with mild stenosis of the proximal cervical segment. No hemodynamically significant stenosis by NASCET criteria.Right thyroid lobe hypodensity measuring 1 cm. Recommend nonemergent thyroid ultrasound for further evaluation./fall precautions

## 2021-12-13 RX ORDER — FAMCICLOVIR 500 MG/1
500 TABLET ORAL DAILY
COMMUNITY
End: 2021-12-13 | Stop reason: SDUPTHER

## 2021-12-13 RX ORDER — FAMCICLOVIR 500 MG/1
500 TABLET ORAL 2 TIMES DAILY
Qty: 60 TABLET | Refills: 0 | Status: CANCELLED | OUTPATIENT
Start: 2021-12-13 | End: 2022-01-12

## 2021-12-13 RX ORDER — FAMCICLOVIR 500 MG/1
500 TABLET ORAL DAILY
Qty: 30 TABLET | Refills: 0 | Status: SHIPPED | OUTPATIENT
Start: 2021-12-13 | End: 2022-01-12

## 2021-12-13 NOTE — TELEPHONE ENCOUNTER
famciclovir 500 mg tablet  Take 1 tablet by mouth once daily  This medication was listed in [place]  Patient says that he doesn't take it all the time only when he needs it and he needs it now.     Patient is scheduled to see Dr. Kathy Rose in January

## 2021-12-13 NOTE — PERIOP NOTES
PRE-SURGICAL INSTRUCTIONS        Patient's Name:  Naveen Christopher      Today's Date:  12/13/2021            Covid Testing Date and Time:    Surgery Date:  12/20/2021                1. Do NOT eat or drink anything, including candy, gum, or ice chips after midnight on 1220, unless you have specific instructions from your surgeon or anesthesia provider to do so.  2. You may brush your teeth before coming to the hospital.  3. No smoking 24 hours prior to the day of surgery. 4. No alcohol 24 hours prior to the day of surgery. 5. No recreational drugs for one week prior to the day of surgery. 6. Leave all valuables, including money/purse, at home. 7. Remove all jewelry, nail polish, acrylic nails, and makeup (including mascara); no lotions powders, deodorant, or perfume/cologne/after shave on the skin. 8. Follow instruction for Hibiclens washes and CHG wipes from surgeon's office. 9. Glasses/contact lenses and dentures may be worn to the hospital.  They will be removed prior to surgery. 10. Call your doctor if symptoms of a cold or illness develop within 24-48 hours prior to your surgery. 11.  If you are having an outpatient procedure, please make arrangements for a responsible ADULT TO [address] [place] [address] and stay with you for 24 hours after your surgery. 12. ONE VISITOR in the hospital at this time for outpatient procedures. Exceptions may be made for surgical admissions, per nursing unit guidelines      Special Instructions:      Bring list of CURRENT medications. Bring any pertinent legal medical records. Take these medications the morning of surgery with a sip of water:  Per office        On the day of surgery, come in the main entrance of [facility]. Let the  at the desk know you are there for surgery. A staff member will come escort you to the surgical area on the second floor.     If you have any questions or concerns, please do not hesitate to call:     (Prior to the day of surgery) [facility] department:  [phone]   (Day of surgery) Pre-Op department:  [phone]    These surgical instructions were reviewed with per office during the [facility] phone call.

## 2021-12-14 RX ORDER — ROSUVASTATIN CALCIUM 10 MG/1
TABLET, COATED ORAL
Qty: 30 TABLET | Refills: 0 | Status: SHIPPED | OUTPATIENT
Start: 2021-12-14 | End: 2022-01-12

## 2021-12-17 ENCOUNTER — ANESTHESIA EVENT (OUTPATIENT)
Dept: ENDOSCOPY | Age: 66
End: 2021-12-17
Payer: MEDICARE

## 2021-12-20 ENCOUNTER — HOSPITAL ENCOUNTER (OUTPATIENT)
Age: 66
Setting detail: OUTPATIENT SURGERY
Discharge: HOME | End: 2021-12-20
Attending: INTERNAL MEDICINE | Admitting: INTERNAL MEDICINE
Payer: MEDICARE

## 2021-12-20 ENCOUNTER — ANESTHESIA (OUTPATIENT)
Dept: ENDOSCOPY | Age: 66
End: 2021-12-20
Payer: MEDICARE

## 2021-12-20 VITALS
TEMPERATURE: 97.3 F | OXYGEN SATURATION: 97 % | WEIGHT: 266 LBS | DIASTOLIC BLOOD PRESSURE: 85 MMHG | SYSTOLIC BLOOD PRESSURE: 142 MMHG | HEART RATE: 70 BPM | HEIGHT: 71 IN | RESPIRATION RATE: 17 BRPM | BODY MASS INDEX: 37.24 KG/M2

## 2021-12-20 PROCEDURE — 00731 ANES UPR GI NDSC PX NOS: CPT | Performed by: NURSE ANESTHETIST, CERTIFIED REGISTERED

## 2021-12-20 PROCEDURE — 77030008565 HC TBNG SUC IRR ERBE -B: Performed by: INTERNAL MEDICINE

## 2021-12-20 PROCEDURE — 76040000019: Performed by: INTERNAL MEDICINE

## 2021-12-20 PROCEDURE — 74011250636 HC RX REV CODE- 250/636: Performed by: NURSE ANESTHETIST, CERTIFIED REGISTERED

## 2021-12-20 PROCEDURE — 76060000031 HC ANESTHESIA FIRST 0.5 HR: Performed by: INTERNAL MEDICINE

## 2021-12-20 PROCEDURE — 74011000250 HC RX REV CODE- 250: Performed by: NURSE ANESTHETIST, CERTIFIED REGISTERED

## 2021-12-20 PROCEDURE — 00731 ANES UPR GI NDSC PX NOS: CPT | Performed by: ANESTHESIOLOGY

## 2021-12-20 PROCEDURE — 2709999900 HC NON-CHARGEABLE SUPPLY: Performed by: INTERNAL MEDICINE

## 2021-12-20 RX ORDER — SODIUM CHLORIDE 0.9 % (FLUSH) 0.9 %
5-40 SYRINGE (ML) INJECTION EVERY 8 HOURS
Status: DISCONTINUED | OUTPATIENT
Start: 2021-12-20 | End: 2021-12-20 | Stop reason: HOSPADM

## 2021-12-20 RX ORDER — PROPOFOL 10 MG/ML
INJECTION, EMULSION INTRAVENOUS AS NEEDED
Status: DISCONTINUED | OUTPATIENT
Start: 2021-12-20 | End: 2021-12-20 | Stop reason: HOSPADM

## 2021-12-20 RX ORDER — SODIUM CHLORIDE, SODIUM LACTATE, POTASSIUM CHLORIDE, CALCIUM CHLORIDE 600; 310; 30; 20 MG/100ML; MG/100ML; MG/100ML; MG/100ML
75 INJECTION, SOLUTION INTRAVENOUS CONTINUOUS
Status: DISCONTINUED | OUTPATIENT
Start: 2021-12-20 | End: 2021-12-20 | Stop reason: HOSPADM

## 2021-12-20 RX ORDER — LIDOCAINE HYDROCHLORIDE 20 MG/ML
INJECTION, SOLUTION EPIDURAL; INFILTRATION; INTRACAUDAL; PERINEURAL AS NEEDED
Status: DISCONTINUED | OUTPATIENT
Start: 2021-12-20 | End: 2021-12-20 | Stop reason: HOSPADM

## 2021-12-20 RX ORDER — SODIUM CHLORIDE, SODIUM LACTATE, POTASSIUM CHLORIDE, CALCIUM CHLORIDE 600; 310; 30; 20 MG/100ML; MG/100ML; MG/100ML; MG/100ML
50 INJECTION, SOLUTION INTRAVENOUS CONTINUOUS
Status: DISCONTINUED | OUTPATIENT
Start: 2021-12-20 | End: 2021-12-20 | Stop reason: HOSPADM

## 2021-12-20 RX ORDER — SODIUM CHLORIDE 0.9 % (FLUSH) 0.9 %
5-40 SYRINGE (ML) INJECTION AS NEEDED
Status: DISCONTINUED | OUTPATIENT
Start: 2021-12-20 | End: 2021-12-20 | Stop reason: HOSPADM

## 2021-12-20 RX ORDER — LIDOCAINE HYDROCHLORIDE 10 MG/ML
0.1 INJECTION, SOLUTION EPIDURAL; INFILTRATION; INTRACAUDAL; PERINEURAL AS NEEDED
Status: DISCONTINUED | OUTPATIENT
Start: 2021-12-20 | End: 2021-12-20 | Stop reason: HOSPADM

## 2021-12-20 RX ADMIN — LIDOCAINE HYDROCHLORIDE 100 MG: 20 INJECTION, SOLUTION EPIDURAL; INFILTRATION; INTRACAUDAL; PERINEURAL at 07:57

## 2021-12-20 RX ADMIN — PROPOFOL 120 MG: 10 INJECTION, EMULSION INTRAVENOUS at 07:57

## 2021-12-20 RX ADMIN — PROPOFOL 80 MG: 10 INJECTION, EMULSION INTRAVENOUS at 07:56

## 2021-12-20 RX ADMIN — SODIUM CHLORIDE, SODIUM LACTATE, POTASSIUM CHLORIDE, AND CALCIUM CHLORIDE 75 ML/HR: 600; 310; 30; 20 INJECTION, SOLUTION INTRAVENOUS at 07:40

## 2021-12-20 RX ADMIN — FAMOTIDINE 20 MG: 10 INJECTION INTRAVENOUS at 07:39

## 2021-12-20 NOTE — ANESTHESIA POSTPROCEDURE EVALUATION
Procedure(s):  UPPER ENDOSCOPY. MAC    Anesthesia Post Evaluation      Multimodal analgesia: multimodal analgesia used between 6 hours prior to anesthesia start to PACU discharge  Patient location during evaluation: bedside  Patient participation: complete - patient participated  Level of consciousness: awake  Pain management: adequate  Airway patency: patent  Anesthetic complications: no  Cardiovascular status: stable  Respiratory status: acceptable  Hydration status: acceptable  Post anesthesia nausea and vomiting:  controlled  Final Post Anesthesia Temperature Assessment:  Normothermia (36.0-37.5 degrees C)      INITIAL Post-op Vital signs:   Vitals Value Taken Time   /67 12/20/21 0816   Temp 36.3 °C (97.3 °F) 12/20/21 0806   Pulse 72 12/20/21 0823   Resp 13 12/20/21 0823   SpO2 97 % 12/20/21 0823   Vitals shown include unvalidated device data.

## 2021-12-20 NOTE — H&P
[email or web address]  [phone]      History and Physical    Patient: Hesham Middleton MRN: [id number]  SSN: [id number]    YOB: 1955  Age: 73 y.o. Sex: male      Subjective:      Hesham Middleton is a 73 y.o. male who presents for variceal surveillance in setting of hepatic cirrhosis. Past Medical History:   Diagnosis Date    Atrial fibrillation ([address] 2020    Coronary artery disease     Coronary atherosclerosis of native coronary artery     Stable, no recent angina    ED (erectile dysfunction)     Elevated blood pressure reading without diagnosis of hypertension     Monitor     History of hepatitis C     treated with interferon    Obstructive sleep apnea 2020    MELANI on CPAP     Other and unspecified hyperlipidemia     Paroxysmal atrial fibrillation (HCC) 2020    Platelet inhibition due to Plavix     on Eliquis    Postsurgical percutaneous transluminal coronary angioplasty status     Eight years since stent, no recent stent testing, EKG normal     Past Surgical History:   Procedure Laterality Date    HX CORONARY STENT PLACEMENT      HX HEART VALVE SURGERY        Family History   Problem Relation Age of Onset    Heart Disease Father     Cancer Mother      Social History     Tobacco Use    Smoking status: Former Smoker     Quit date:      Years since quittin.9    Smokeless tobacco: Former User    Tobacco comment: Remotely quit tobacco use    Substance Use Topics    Alcohol use: Yes     Comment: Occasional      Prior to Admission medications    Medication Sig Start Date End Date Taking? Authorizing Provider   famciclovir (FAMVIR) 500 mg tablet Take 1 Tablet by mouth daily for 30 days. Take I tablet one time per day  Patient taking differently: Take 500 mg by mouth as needed. Take I tablet one time per day as needed for flare ups 21 Yes Gal Mann MD   MILK THISTLE PO Take  by mouth.  daily   Yes Provider, Historical   losartan (COZAAR) 100 mg tablet Take 1 Tablet by mouth daily. 11/15/21  Yes Brannon Boyer NP   tadalafiL (Cialis) 2.5 mg tablet Take 2.5 mg by mouth daily. Yes Provider, Historical   furosemide (LASIX) 20 mg tablet Take 1 Tablet by mouth daily. 9/24/21  Yes Brannon Boyer NP   colchicine 0.6 mg tablet TAKE ONE (1) TABLET BY MOUTH EVERY DAY   4/8/21  Yes Ирина Sam NP   co-enzyme Q-10 (Co Q-10) 100 mg capsule Take 100 mg by mouth daily. Yes Provider, Historical   rosuvastatin (CRESTOR) 10 mg tablet TAKE 1 TABLET BY MOUTH EVERY EVENING 12/14/21   Brannon Boyer NP   aspirin 81 mg chewable tablet Take 1 Tab by mouth daily. Patient not taking: Reported on 11/15/2021 1/24/20   Shiv Pablo MD        No Known Allergies    Review of Systems:  A comprehensive review of systems was negative except for that written in the History of Present Illness. Objective:     Vitals:    12/13/21 1459   Weight: 122 kg (269 lb)        Physical Exam:  GENERAL: alert, cooperative, no distress, appears stated age  LUNG: clear to auscultation bilaterally  HEART: regular rate and rhythm, S1, S2 normal, no murmur, click, rub or gallop  ABDOMEN: soft, non-tender. Bowel sounds normal. No masses,  no organomegaly  NEUROLOGIC: alert & oriented x 3    Assessment:     1. Cirrhosis  2. Esophageal varices    Plan:     1. EGD    Signed By: Danica Oliver MD     December 20, 2021      Danica Oliver MD  [facility] [address] - [phone]  [email or web address] [facility]

## 2021-12-20 NOTE — PROCEDURES
[email or web address]  [phone]        Brief Procedure Note    Emmy Gipson  1955  [id number]    Date of Procedure: 2021    Preoperative diagnosis: Esophageal varices without bleedin.1 - I85.00  Cirrhosis    Postoperative diagnosis: no varices (gastric or esophageal)  esophagitis    Description of Findings: same    Sedation/Anesthesia: Monitored Anesthesia Care; See Anesthesia Note    Procedure: Procedure(s):  UPPER ENDOSCOPY    :  Dr. Linnette Velazquez MD    Assistant(s): Endoscopy Technician-1: Sharon Nation  Endoscopy RN-1: Aleah Lora RN    EBL:None    Specimens: * No specimens in log *    Findings: See printed and scanned procedure note    Complications: None    Tissue Implant Device: None    Dr. Linnette Velazquez MD  2021  8:07 AM    Linnette Velazquez MD  [facility] [address] [phone]  [email or web address]

## 2021-12-20 NOTE — ANESTHESIA PREPROCEDURE EVALUATION
Relevant Problems   No relevant active problems       Anesthetic History   No history of anesthetic complications            Review of Systems / Medical History  Patient summary reviewed and nursing notes reviewed    Pulmonary        Sleep apnea: CPAP           Neuro/Psych   Within defined limits           Cardiovascular    Hypertension        Dysrhythmias   CAD and cardiac stents    Exercise tolerance: >4 METS  Comments:      GI/Hepatic/Renal           Liver disease     Endo/Other        Morbid obesity     Other Findings              Physical Exam    Airway  Mallampati: III  TM Distance: 4 - 6 cm  Neck ROM: normal range of motion   Mouth opening: Normal     Cardiovascular  Regular rate and rhythm,  S1 and S2 normal,  no murmur, click, rub, or gallop  Rhythm: regular  Rate: normal         Dental  No notable dental hx       Pulmonary  Breath sounds clear to auscultation               Abdominal  GI exam deferred       Other Findings            Anesthetic Plan    ASA: 4  Anesthesia type: MAC          Induction: Intravenous  Anesthetic plan and risks discussed with: Patient

## 2021-12-20 NOTE — DISCHARGE INSTRUCTIONS
Upper GI Endoscopy: What to Expect at [address] [place] [address]  After you have an endoscopy, you will stay at the hospital or clinic for 1 to 2 hours. This will allow the medicine to wear off. You will be able to go home after your doctor or nurse checks to make sure you are not having any problems. You may have to stay overnight if you had treatment during the test. You may have a sore throat for a day or two after the test.  This care sheet gives you a general idea about what to expect after the test.  How can you care for yourself at home? Activity  · Rest as much as you need to after you go home. · You should be able to go back to your usual activities the day after the test.  Diet  · Follow your doctor's directions for eating after the test.  · Drink plenty of fluids (unless your doctor has told you not to). Medications  · If you have a sore throat the day after the test, use an over-the-counter spray to numb your throat. Follow-up care is a key part of your treatment and safety. Be sure to make and go to all appointments, and call your doctor if you are having problems. It's also a good idea to know your test results and keep a list of the medicines you take. When should you call for help? Call 911 anytime you think you may need emergency care. For example, call if:  · You passed out (lost consciousness). · You cough up blood. · You vomit blood or what looks like coffee grounds. · You pass maroon or very bloody stools. Call your doctor now or seek immediate medical care if:  · You have trouble swallowing. · You have belly pain. · Your stools are black and tarlike or have streaks of blood. · You are sick to your stomach or cannot keep fluids down. Watch closely for changes in your health, and be sure to contact your doctor if:  · Your throat still hurts after a day or two. · You do not get better as expected. Where can you learn more?    Go to DealExplorer[employer]be  Enter J454 in the search box to learn more about \"Upper GI Endoscopy: What to Expect at Home. \"   © 9082-9773 Healthwise, Incorporated. Care instructions adapted under license by [facility] (which disclaims liability or warranty for this information). This care instruction is for use with your licensed healthcare professional. If you have questions about a medical condition or this instruction, always ask your healthcare professional. [address] any warranty or liability for your use of this information. Content Version: 39.7.561274; Current as of: November 14, 2014    DISCHARGE SUMMARY from Nurse     POST-PROCEDURE INSTRUCTIONS:    Call your Physician if you:  ? Observe any excess bleeding. ? Develop a temperature over 100.5o F.  ? Experience abdominal, shoulder or chest pain. ? Notice any signs of decreased circulation or nerve impairment to an extremity such as a change in color, persistent numbness, tingling, coldness or increase in pain. ? Vomit blood or you have nausea and vomiting lasting longer than 4 hours. ? Are unable to take medications. ? Are unable to urinate within 8 hours after discharge following general anesthesia or intravenous sedation. For the next 24 hours after receiving general anesthesia or intravenous sedation, or while taking prescription Narcotics, limit your activities:  ? Do NOT drive a motor vehicle, operate hazard machinery or power tools, or perform tasks that require coordination. The medication you received during your procedure may have some effect on your mental awareness. ? Do NOT make important personal or business decisions. The medication you received during your procedure may have some effect on your mental awareness. ? Do NOT drink alcoholic beverages. These drinks do not mix well with the medications that have been given to you. ? Upon discharge from the hospital, you must be accompanied by a responsible adult. ?  Resume your diet as directed by your physician. ? Resume medications as your physician has prescribed. ? Please give a list of your current medications to your Primary Care Provider. ? Please update this list whenever your medications are discontinued, doses are changed, or new medications (including over-the-counter products) are added. ? Please carry medication information at all times in case of emergency situations. These are general instructions for a healthy lifestyle:    No smoking/ No tobacco products/ Avoid exposure to second hand smoke.  Surgeon General's Warning:  Quitting smoking now greatly reduces serious risk to your health. Obesity, smoking, and a sedentary lifestyle greatly increase your risk for illness.  A healthy diet, regular physical exercise & weight monitoring are important for maintaining a healthy lifestyle   You may be retaining fluid if you have a history of heart failure or if you experience any of the following symptoms:  Weight gain of 3 pounds or more overnight or 5 pounds in a week, increased swelling in our hands or feet or shortness of breath while lying flat in bed. Please call your doctor as soon as you notice any of these symptoms; do not wait until your next office visit. Colorectal Screening   Colorectal cancer almost always develops from precancerous polyps (abnormal growths) in the colon or rectum. Screening tests can find precancerous polyps, so that they can be removed before they turn into cancer. Screening tests can also find colorectal cancer early, when treatment works best.  [facility] Speak with your physician about when you should begin screening and how often you should be tested  [facility]   Additional Information    Educational references and/or instructions provided during this visit included:    See attached. APPOINTMENTS:    Per MD Instruction. Discharge information has been reviewed with the patient. The patient verbalized understanding.   Patient Education        Esophagitis: Care Instructions  Your Care Instructions     Esophagitis (say \"ih-sof-uh-JY-tus\") is irritation of the esophagus, the tube that carries food from your throat to your stomach. Acid reflux is the most common cause of this condition. When you have reflux, stomach acid and juices flow upward. This can cause pain or a burning feeling in your chest. You may have a sore throat. It may be hard to swallow. Other causes of this condition include some medicines and supplements. Allergies or an infection can also cause it. Your doctor will ask about your symptoms and past health. He or she might do tests to find the cause of your symptoms. Treatment depends on what is causing the problem. Treatment might include changing your diet or taking medicine to relieve your symptoms. It might also include changing a medicine that is causing your symptoms. If you have reflux, medicine that reduces the stomach acid helps your body heal. It might take 1 to 3 weeks to heal.  Follow-up care is a key part of your treatment and safety. Be sure to make and go to all appointments, and call your doctor if you are having problems. It's also a good idea to know your test results and keep a list of the medicines you take. How can you care for yourself at home? · If you have acid reflux, your doctor may recommend that you:  ? Eat several small meals instead of two or three large meals. After you eat, wait 2 to 3 hours before you lie down. ? Avoid chocolate, mint, alcohol, and spicy foods. ? Don't smoke or use smokeless tobacco. Smoking can make this condition worse. If you need help quitting, talk to your doctor about stop-smoking programs and medicines. These can increase your chances of quitting for good. ? Raise the head of your bed 6 to 8 inches if you have symptoms at night. ? Lose weight if you are overweight. ? Take an over-the-counter antacid, such as Maalox, Mylanta, or Tums.  Be careful when you take over-the-counter antacid medicines. Many of these medicines have aspirin in them. Read the label to make sure that you are not taking more than the recommended dose. Too much aspirin can be harmful. ? Take stronger acid reducers. Examples are famotidine (such as Pepcid) and omeprazole (such as Prilosec). · If your condition is caused by infection, allergy, or other problems, use the medicine or treatments that your doctor recommends. · Be safe with medicines. Take your medicines exactly as prescribed. Call your doctor if you think you are having a problem with your medicine. When should you call for help? Call your doctor now or seek immediate medical care if:    · You have new or worse belly pain.     · You are vomiting. Watch closely for changes in your health, and be sure to contact your doctor if:    · You have new or worse symptoms of reflux.     · You have trouble or pain swallowing.     · You are losing weight.     · You do not get better as expected. Where can you learn more? Go to http://www.gray.com/  Enter N977 in the search box to learn more about \"Esophagitis: Care Instructions. \"  Current as of: February 10, 2021               Content Version: 13.0  © 8246-3856 Healthwise, Incorporated. Care instructions adapted under license by [facility] (which disclaims liability or warranty for this information). If you have questions about a medical condition or this instruction, always ask your healthcare professional. [place] [address] any warranty or liability for your use of this information.

## 2022-01-12 DIAGNOSIS — I30.9 ACUTE PERICARDITIS, UNSPECIFIED TYPE: ICD-10-CM

## 2022-01-12 RX ORDER — COLCHICINE 0.6 MG/1
TABLET ORAL
Qty: 60 TABLET | Refills: 0 | Status: SHIPPED | OUTPATIENT
Start: 2022-01-12 | End: 2022-03-22

## 2022-01-12 RX ORDER — ROSUVASTATIN CALCIUM 10 MG/1
TABLET, COATED ORAL
Qty: 30 TABLET | Refills: 0 | Status: SHIPPED | OUTPATIENT
Start: 2022-01-12 | End: 2022-03-01

## 2022-01-25 ENCOUNTER — OFFICE VISIT (OUTPATIENT)
Dept: FAMILY MEDICINE CLINIC | Age: 67
End: 2022-01-25
Payer: MEDICARE

## 2022-01-25 ENCOUNTER — HOSPITAL ENCOUNTER (OUTPATIENT)
Dept: LAB | Age: 67
Discharge: HOME | End: 2022-01-25
Payer: MEDICARE

## 2022-01-25 VITALS
SYSTOLIC BLOOD PRESSURE: 136 MMHG | DIASTOLIC BLOOD PRESSURE: 70 MMHG | HEART RATE: 73 BPM | BODY MASS INDEX: 38.64 KG/M2 | OXYGEN SATURATION: 97 % | TEMPERATURE: 98.4 F | WEIGHT: 276 LBS | HEIGHT: 71 IN

## 2022-01-25 DIAGNOSIS — K74.60 HEPATIC CIRRHOSIS, UNSPECIFIED HEPATIC CIRRHOSIS TYPE, UNSPECIFIED WHETHER ASCITES PRESENT (HCC): Primary | ICD-10-CM

## 2022-01-25 DIAGNOSIS — I50.33 DIASTOLIC CHF, ACUTE ON CHRONIC (HCC): ICD-10-CM

## 2022-01-25 DIAGNOSIS — I25.119 ATHEROSCLEROSIS OF NATIVE CORONARY ARTERY OF NATIVE HEART WITH ANGINA PECTORIS (HCC): ICD-10-CM

## 2022-01-25 DIAGNOSIS — B18.2 CHRONIC HEPATITIS C WITHOUT HEPATIC COMA (HCC): ICD-10-CM

## 2022-01-25 DIAGNOSIS — F33.0 MILD EPISODE OF RECURRENT MAJOR DEPRESSIVE DISORDER (HCC): ICD-10-CM

## 2022-01-25 DIAGNOSIS — E66.01 SEVERE OBESITY (BMI 35.0-35.9 WITH COMORBIDITY) (HCC): ICD-10-CM

## 2022-01-25 DIAGNOSIS — I48.0 PAROXYSMAL ATRIAL FIBRILLATION (HCC): ICD-10-CM

## 2022-01-25 LAB
CHOLEST SERPL-MCNC: 154 MG/DL
HDLC SERPL-MCNC: 48 MG/DL (ref 40–60)
HDLC SERPL: 3.2 {RATIO} (ref 0–5)
LDLC SERPL CALC-MCNC: 62.6 MG/DL (ref 0–100)
LIPID PROFILE,FLP: ABNORMAL
TRIGL SERPL-MCNC: 217 MG/DL (ref ?–150)
VLDLC SERPL CALC-MCNC: 43.4 MG/DL

## 2022-01-25 PROCEDURE — G8754 DIAS BP LESS 90: HCPCS | Performed by: STUDENT IN AN ORGANIZED HEALTH CARE EDUCATION/TRAINING PROGRAM

## 2022-01-25 PROCEDURE — G8536 NO DOC ELDER MAL SCRN: HCPCS | Performed by: STUDENT IN AN ORGANIZED HEALTH CARE EDUCATION/TRAINING PROGRAM

## 2022-01-25 PROCEDURE — 1101F PT FALLS ASSESS-DOCD LE1/YR: CPT | Performed by: STUDENT IN AN ORGANIZED HEALTH CARE EDUCATION/TRAINING PROGRAM

## 2022-01-25 PROCEDURE — 80061 LIPID PANEL: CPT

## 2022-01-25 PROCEDURE — G8417 CALC BMI ABV UP PARAM F/U: HCPCS | Performed by: STUDENT IN AN ORGANIZED HEALTH CARE EDUCATION/TRAINING PROGRAM

## 2022-01-25 PROCEDURE — 36415 COLL VENOUS BLD VENIPUNCTURE: CPT

## 2022-01-25 PROCEDURE — G8427 DOCREV CUR MEDS BY ELIG CLIN: HCPCS | Performed by: STUDENT IN AN ORGANIZED HEALTH CARE EDUCATION/TRAINING PROGRAM

## 2022-01-25 PROCEDURE — G8752 SYS BP LESS 140: HCPCS | Performed by: STUDENT IN AN ORGANIZED HEALTH CARE EDUCATION/TRAINING PROGRAM

## 2022-01-25 PROCEDURE — 99214 OFFICE O/P EST MOD 30 MIN: CPT | Performed by: STUDENT IN AN ORGANIZED HEALTH CARE EDUCATION/TRAINING PROGRAM

## 2022-01-25 PROCEDURE — 3017F COLORECTAL CA SCREEN DOC REV: CPT | Performed by: STUDENT IN AN ORGANIZED HEALTH CARE EDUCATION/TRAINING PROGRAM

## 2022-01-25 PROCEDURE — G8431 POS CLIN DEPRES SCRN F/U DOC: HCPCS | Performed by: STUDENT IN AN ORGANIZED HEALTH CARE EDUCATION/TRAINING PROGRAM

## 2022-01-25 RX ORDER — PANTOPRAZOLE SODIUM 40 MG/1
40 TABLET, DELAYED RELEASE ORAL DAILY
COMMUNITY
Start: 2021-12-20 | End: 2022-05-12

## 2022-01-25 RX ORDER — SERTRALINE HYDROCHLORIDE 50 MG/1
50 TABLET, FILM COATED ORAL DAILY
Qty: 90 TABLET | Refills: 0 | Status: SHIPPED | OUTPATIENT
Start: 2022-01-25 | End: 2022-02-14

## 2022-01-25 NOTE — PROGRESS NOTES
Ta Georgettedekris presents today for   Chief Complaint   Patient presents with   Samreen Garcia Establish Care    Medication Refill       Is someone accompanying this pt? No     Is the patient using any DME equipment during OV? No     Depression Screening:  3 most recent PHQ Screens 1/25/2022   Little interest or pleasure in doing things Nearly every day   Feeling down, depressed, irritable, or hopeless Nearly every day   Total Score PHQ 2 6   Trouble falling or staying asleep, or sleeping too much More than half the days   Feeling tired or having little energy More than half the days   Poor appetite, weight loss, or overeating More than half the days   Feeling bad about yourself - or that you are a failure or have let yourself or your family down Not at all   Trouble concentrating on things such as school, work, reading, or watching TV Several days   Moving or speaking so slowly that other people could have noticed; or the opposite being so fidgety that others notice Nearly every day   Thoughts of being better off dead, or hurting yourself in some way Not at all   PHQ 9 Score 16   How difficult have these problems made it for you to do your work, take care of your home and get along with others Very difficult       Learning Assessment:  Learning Assessment 6/27/2017   PRIMARY LEARNER Patient   PRIMARY LANGUAGE ENGLISH   LEARNER PREFERENCE PRIMARY LISTENING   ANSWERED BY patient   RELATIONSHIP SELF       Fall Risk  Fall Risk Assessment, last 12 mths 9/24/2021   Able to walk? Yes   Fall in past 12 months? 0   Do you feel unsteady? -   Are you worried about falling -       Health Maintenance reviewed and discussed and ordered per Provider. Health Maintenance Due   Topic Date Due    Lipid Screen  Never done    DTaP/Tdap/Td series (1 - Tdap) Never done    Shingrix Vaccine Age 50> (1 of 2) Never done    Pneumococcal 65+ years (1 of 1 - PPSV23) Never done    Medicare Yearly Exam  Never done    Flu Vaccine (1) 09/01/2021   . Coordination of Care:  1. Have you been to the ER, urgent care clinic since your last visit? Hospitalized since your last visit? No     2. Have you seen or consulted any other health care providers outside of the [address] [place] [address] since your last visit? Include any pap smears or colon screening.  [place] [facility] Dr. Olivier Leon

## 2022-01-27 NOTE — PROGRESS NOTES
Subjective:   Padmini Brooks is a 73 y.o. male who was seen for Establish Care and Medication Refill    Patient has been having some fatigue and been more depressed lately. Has not been motivated to do certain activities. Would like to be started on depression medications. Home Medications    Medication Sig Start Date End Date Taking? Authorizing Provider   pantoprazole (PROTONIX) 40 mg tablet 40 mg. 21  Yes Provider, Historical   sertraline (ZOLOFT) 50 mg tablet Take 1 Tablet by mouth daily. 22  Yes Milagro Patterson MD   colchicine 0.6 mg tablet TAKE 1 TABLET BY MOUTH DAILY 22  Yes Brannon Boyer NP   rosuvastatin (CRESTOR) 10 mg tablet TAKE 1 TABLET BY MOUTH EVERY EVENING 22  Yes Brannon Boyer NP   MILK THISTLE PO Take  by mouth. daily   Yes Provider, Historical   losartan (COZAAR) 100 mg tablet Take 1 Tablet by mouth daily. 11/15/21  Yes Brannon Boyer NP   tadalafiL (Cialis) 2.5 mg tablet Take 2.5 mg by mouth daily. Yes Provider, Historical   furosemide (LASIX) 20 mg tablet Take 1 Tablet by mouth daily. 21  Yes Brannon Boyer NP   co-enzyme Q-10 (Co Q-10) 100 mg capsule Take 100 mg by mouth daily. Yes Provider, Historical   aspirin 81 mg chewable tablet Take 1 Tab by mouth daily. 20  Yes Germán Taylor MD      No Known Allergies  Social History     Tobacco Use    Smoking status: Former Smoker     Quit date:      Years since quittin.0    Smokeless tobacco: Former User    Tobacco comment: Remotely quit tobacco use    Substance Use Topics    Alcohol use: Yes     Comment: Occasional    Drug use: Never            Review of Systems   Psychiatric/Behavioral: Positive for dysphoric mood. All other systems reviewed and are negative.          Objective:     Visit Vitals  /70 (BP 1 Location: Left upper arm, BP Patient Position: Sitting, BP Cuff Size: Large adult long)   Pulse 73   Temp 98.4 °F (36.9 °C) (Oral)   Ht 5' 11\" (1.803 m)   Wt 276 lb (125.2 kg)   SpO2 97%   BMI 38.49 kg/m²        General: alert, oriented, not in distress  Head: scalp normal, atraumatic  Eyes: pupils are equal and reactive, full and intact EOM's  Ears: patent ear canal, intact tympanic membrane  Nose: normal turbinates, no congestion or discharge  Lips/Mouth: moist lips and buccal mucosa, non-enlarged tonsils, pink throat  Neck: supple, no JVD, no lymphadenopathy, non-palpable thyroid  Chest/Lungs: clear breath sounds, no wheezing or crackles  Heart: normal rate, regular rhythm, no murmur  Abdomen: soft, non-distended, non-tender, normal bowel sounds, no organomegaly, no masses  Extremities: no focal deformities, no edema  Skin: no active skin lesions    Laboratory/Tests:  Hospital Outpatient Visit on 01/25/2022   Component Date Value Ref Range Status    LIPID PROFILE 01/25/2022      Final    Cholesterol, total 01/25/2022 154  <200 mg/dL Final    Triglyceride 01/25/2022 217* <150 mg/dL Final    Comment: The drugs N-acetylcysteine (NAC) and  Metamiszole have been found to cause falsely  low results in this chemical assay. Please  be sure to submit blood samples obtained  BEFORE administration of either of these  drugs to assure correct results.       HDL Cholesterol 01/25/2022 48  40 - 60 mg/dL Final    LDL, calculated 01/25/2022 62.6  0 - 100 mg/dL Final    VLDL, calculated 01/25/2022 43.4  mg/dL Final    CHOL/HDL Ratio 01/25/2022 3.2  0 - 5.0   Final   Hospital Outpatient Visit on 10/19/2021   Component Date Value Ref Range Status    LA Major Axis 10/19/2021 6.00  cm Final    LA Area 2C 10/19/2021 19.20  cm2 Final    LA Area 4C 10/19/2021 22.10  cm2 Final    LA Volume DISK BP 10/19/2021 65.70  18.0 - 58.0 cm3 Final    LA Major Axis 10/19/2021 4.60  cm Final    LA/AO Root Ratio 10/19/2021 1.44   Final    RA Area 4C 10/19/2021 21.70  cm2 Final    RA Major Axis 10/19/2021 5.79  cm Final    Est. RA Pressure 10/19/2021 3.00  mmHg Final    RA Minor Axis 10/19/2021 4.90  cm Final    AV Cusp Mmode 10/19/2021 1.90  cm Final    AV Mean Gradient 10/19/2021 5.00  mmHg Final    AV VTI 10/19/2021 31.50  cm Final    AV Mean Velocity 10/19/2021 107.00  cm/s Final    AV Peak Velocity 10/19/2021 151.00  cm/s Final    AV Peak Gradient 10/19/2021 9.00  mmHg Final    AV Area by VTI 10/19/2021 3.15  cm2 Final    AV Area by Peak Velocity 10/19/2021 3.47  cm2 Final    CHRISTA/BSA 10/19/2021 1.33   Final    Aortic Root 10/19/2021 3.20  cm Final    Ascending Aorta 10/19/2021 3.40  cm Final    IVSd 10/19/2021 1.10* 0.60 - 1.00 cm Final    LVIDd 10/19/2021 5.20  4. 20 - 5.90 cm Final    LVIDs 10/19/2021 3.40  cm Final    LVOT Diameter 10/19/2021 2.20  cm Final    Left Ventricular Outflow Tract Radha* 10/19/2021 4.00  mmHg Final    LVOT VTI 10/19/2021 26.10  cm Final    LVOT VTI 10/19/2021 24.30  cm Final    LVOT VTI 10/19/2021 27.90  cm Final    LVOT Peak Velocity 10/19/2021 138.00  cm/s Final    LVOT Peak Gradient 10/19/2021 8.00  mmHg Final    LVPWd 10/19/2021 1.00  0.60 - 1.00 cm Final    LV E' Lateral Velocity 10/19/2021 13.60  cm/s Final    LV E' Septal Velocity 10/19/2021 6.42  cm/s Final    LV EDV A2C 10/19/2021 141.00  cm3 Final    LV ESV A2C 10/19/2021 39.30  cm3 Final    E/E' Lateral 10/19/2021 7.87   Final    E/E' Septal 10/19/2021 16.67   Final    LVOT SV 10/19/2021 99.0  cm3 Final    Mitral Valve Deceleration [place] 10/19/2021 4,480.00  mm/s2 Final    Mitral Valve Deceleration [place] 10/19/2021 4,480.00  mm/s2 Final    MV E Wave Deceleration Time 10/19/2021 239.00  ms Final    MV PHT 10/19/2021 70.00  ms Final    MV A Velocity 10/19/2021 77.60  cm/s Final    MV E Velocity 10/19/2021 107.00  cm/s Final    MV Mean Gradient 10/19/2021 2.00  mmHg Final    MV VTI 10/19/2021 24.30  cm Final    MV Max Velocity 10/19/2021 91.80  cm/s Final    MV Peak Gradient 10/19/2021 3.00  mmHg Final    MV Area by PHT 10/19/2021 3.14  cm2 Final    MV E/A 10/19/2021 1.38   Final    RVIDd 10/19/2021 3.30  cm Final    RVSP 10/19/2021 25.00  mmHg Final    TAPSE 10/19/2021 1.93  1.50 - 2.00 cm Final    TR Max Velocity 10/19/2021 233.00  cm/s Final    TV MG 10/19/2021 22.00  mmHg Final    EF BP 10/19/2021 63.5  55.0 - 100.0 % Final    LV Mass 2D 10/19/2021 207.3  88.0 - 224.0 g Final    LV Mass 2D Index 10/19/2021 87.8  49.0 - 115.0 g/m2 Final    E/E' Ratio (Averaged) 10/19/2021 12.27   Final    CHRISTA/BSA Peak Velocity 10/19/2021 1.5  cm2/m2 Final    CHRISTA/BSA VTI 10/19/2021 1.3  cm2/m2 Final   Hospital Outpatient Visit on 09/30/2021   Component Date Value Ref Range Status    Sodium 09/30/2021 137  135 - 145 mmol/L Final    Potassium 09/30/2021 4.0  3.2 - 5.1 mmol/L Final    Chloride 09/30/2021 107  94 - 111 mmol/L Final    CO2 09/30/2021 21  21 - 33 mmol/L Final    Anion gap 09/30/2021 9  mmol/L Final    Glucose 09/30/2021 95  70 - 110 mg/dL Final    BUN 09/30/2021 19  9 - 21 mg/dL Final    Creatinine 09/30/2021 1.00  0.8 - 1.50 mg/dL Final    BUN/Creatinine ratio 09/30/2021 19    Final    GFR est AA 09/30/2021 >60  ml/min/1.73m2 Final    GFR est non-AA 09/30/2021 >60  ml/min/1.73m2 Final    Comment: Estimated GFR is calculated using the IDMS-traceable Modification of Diet in Renal Disease (MDRD) Study equation, reported for both  Americans (GFRAA) and non- Americans (GFRNA), and normalized to 1.73m2 body surface area. The physician must decide which value applies to the patient. The MDRD study equation should only be used in individuals age 25 or older. It has not been validated for the following: pregnant women, patients with serious comorbid conditions, or on certain medications, or persons with extremes of body size, muscle mass, or nutritional status.       Calcium 09/30/2021 8.8  8.5 - 10.5 mg/dL Final    Bilirubin, total 09/30/2021 2.2* 0.2 - 1.0 mg/dL Final    AST (SGOT) 09/30/2021 107* 17 - 74 U/L Final    ALT (SGPT) 09/30/2021 102* 3 - 72 U/L Final    Alk. phosphatase 09/30/2021 144* 38 - 126 U/L Final    Protein, total 09/30/2021 7.0  6.1 - 8.4 g/dL Final    Albumin 09/30/2021 3.9  3.5 - 4.7 g/dL Final    Globulin 09/30/2021 3.1  g/dL Final    A-G Ratio 09/30/2021 1.3    Final    WBC 09/30/2021 5.5  4.6 - 13.2 K/uL Final    RBC 09/30/2021 4.72  4.35 - 5.65 M/uL Final    HGB 09/30/2021 16.3* 13.0 - 16.0 g/dL Final    HCT 09/30/2021 45.6  36.0 - 48.0 % Final    MCV 09/30/2021 96.6  78.0 - 100.0 FL Final    MCH 09/30/2021 34.5* 24.0 - 34.0 PG Final    MCHC 09/30/2021 35.7  31.0 - 37.0 g/dL Final    RDW 09/30/2021 12.4  11.6 - 14.5 % Final    PLATELET 22/21/5648 82* 135 - 420 K/uL Final    MPV 09/30/2021 10.7  9.2 - 11.8 FL Final    NRBC 09/30/2021 0.0  0.0  WBC Final    ABSOLUTE NRBC 09/30/2021 0.00  0.00 - 0.01 K/uL Final   Hospital Outpatient Visit on 08/16/2021   Component Date Value Ref Range Status    WBC 08/16/2021 5.2  4.6 - 13.2 K/uL Final    RBC 08/16/2021 4.38* 4.70 - 5.50 M/uL Final    HGB 08/16/2021 15.1  13.0 - 16.0 g/dL Final    HCT 08/16/2021 43.0  36.0 - 48.0 % Final    MCV 08/16/2021 98.2* 74.0 - 97.0 FL Final    MCH 08/16/2021 34.5* 24.0 - 34.0 PG Final    MCHC 08/16/2021 35.1  31.0 - 37.0 g/dL Final    RDW 08/16/2021 13.1  11.6 - 14.5 % Final    PLATELET 88/23/1003 85* 135 - 420 K/uL Final    MPV 08/16/2021 10.6  9.2 - 11.8 FL Final    Sodium 08/16/2021 137  135 - 145 mmol/L Final    Potassium 08/16/2021 3.9  3.2 - 5.1 mmol/L Final    Chloride 08/16/2021 106  94 - 111 mmol/L Final    CO2 08/16/2021 23  21 - 33 mmol/L Final    Anion gap 08/16/2021 8  mmol/L Final    Glucose 08/16/2021 123* 70 - 110 mg/dL Final    BUN 08/16/2021 12  9 - 21 mg/dL Final    Creatinine 08/16/2021 0.70* 0.8 - 1.50 mg/dL Final    BUN/Creatinine ratio 08/16/2021 17    Final    GFR est AA 08/16/2021 >60  ml/min/1.73m2 Final    GFR est non-AA 08/16/2021 >60  ml/min/1.73m2 Final    Comment: Estimated GFR is calculated using the IDMS-traceable Modification of Diet in Renal Disease (MDRD) Study equation, reported for both  Americans (GFRAA) and non- Americans (GFRNA), and normalized to 1.73m2 body surface area. The physician must decide which value applies to the patient. The MDRD study equation should only be used in individuals age 25 or older. It has not been validated for the following: pregnant women, patients with serious comorbid conditions, or on certain medications, or persons with extremes of body size, muscle mass, or nutritional status.  Calcium 08/16/2021 8.7  8.5 - 10.5 mg/dL Final    Bilirubin, total 08/16/2021 2.1* 0.2 - 1.0 mg/dL Final    AST (SGOT) 08/16/2021 95* 17 - 74 U/L Final    ALT (SGPT) 08/16/2021 92* 3 - 72 U/L Final    Alk. phosphatase 08/16/2021 149* 38 - 126 U/L Final    Protein, total 08/16/2021 6.6  6.1 - 8.4 g/dL Final    Albumin 08/16/2021 3.7  3.5 - 4.7 g/dL Final    Globulin 08/16/2021 2.9  g/dL Final    A-G Ratio 08/16/2021 1.3    Final   Clinical Support on 04/06/2021   Component Date Value Ref Range Status    Testosterone 04/06/2021 421  264 - 916 ng/dL Final    Comment: Adult male reference interval is based on a population of  healthy nonobese males (BMI <30) between 23and 44years old. [address] [place] [address], [address] [place] [address] [phone]. PMID: [id number].  Protein, total 04/06/2021 6.2  6.0 - 8.5 g/dL Final    Albumin 04/06/2021 3.5* 3.8 - 4.8 g/dL Final    Bilirubin, total 04/06/2021 1.2  0.0 - 1.2 mg/dL Final    Bilirubin, direct 04/06/2021 0.44* 0.00 - 0.40 mg/dL Final    Alk.  phosphatase 04/06/2021 194* 39 - 117 IU/L Final    AST (SGOT) 04/06/2021 79* 0 - 40 IU/L Final    ALT (SGPT) 04/06/2021 74* 0 - 44 IU/L Final   Office Visit on 03/22/2021   Component Date Value Ref Range Status    Testosterone 03/22/2021 458  264 - 916 ng/dL Final    Comment: Adult male reference interval is based on a population of  healthy nonobese males (BMI <30) between 19 and 39 years old.  Xenia Dewitt, [address] [facility] [phone]. PMID: [id number].  Prostate Specific Ag 03/22/2021 0.2  0.0 - 4.0 ng/mL Final    Comment: Roche ECLIA methodology. According to the American Urological Association, Serum PSA should  decrease and remain at undetectable levels after radical  prostatectomy. The AUA defines biochemical recurrence as an initial  PSA value 0.2 ng/mL or greater followed by a subsequent confirmatory  PSA value 0.2 ng/mL or greater. Values obtained with different assay methods or kits cannot be used  interchangeably. Results cannot be interpreted as absolute evidence  of the presence or absence of malignant disease.  Albumin 03/22/2021 4.1  3.8 - 4.8 g/dL Final    Sex Hormone Binding Globulin 03/22/2021 123.0* 19.3 - 76.4 nmol/L Final         Assessment & Plan:     1. Mild episode of recurrent major depressive disorder ([facility] [address]  Will start zoloft. F/u in 2 weeks    2. Diastolic CHF, acute on chronic (HCC)  Euvolemic. Continue Lasix    3. Paroxysmal atrial fibrillation (HCC)  Rate controlled. Not on eliquis or any rate controller. 4. Chronic hepatitis C without hepatic coma (HCC)  S/p treatment    5. Atherosclerosis of native coronary artery of native heart with angina pectoris ([address]  Continue aspirn, crestor. Check lipid panel  - LIPID PANEL    6. Hepatic cirrhosis, unspecified hepatic cirrhosis type, unspecified whether ascites present ([address]  Euvolemic. On Lasix    7. Severe obesity (BMI 35.0-35.9 with comorbidity) ([address]  Complicates all care             I have discussed the diagnosis with the patient and the intended plan as seen in the above orders. The patient has received an after-visit summary and questions were answered concerning future plans. I have discussed medication side effects and warnings with the patient as well. I have reviewed the plan of care with the patient, accepted their input and they are in agreement with the treatment goals.  Previous lab and imaging results were reviewed by me.        Kofi Barrientos MD  January 26, 2022

## 2022-01-31 NOTE — PATIENT INSTRUCTIONS
"Duy VÁSQUEZ" Gigi was seen and treated in our emergency department on 1/31/2022.  He may return to work on 02/01/2022.       If you have any questions or concerns, please don't hesitate to call.      Roger Myers PA-C" Atrial Fibrillation: Care Instructions Your Care Instructions Atrial fibrillation is an irregular and often fast heartbeat. Treating this condition is important for several reasons. It can cause blood clots, which can travel from your heart to your brain and cause a stroke. If you have a fast heartbeat, you may feel lightheaded, dizzy, and weak. An irregular heartbeat can also increase your risk for heart failure. Atrial fibrillation is often the result of another heart condition, such as high blood pressure or coronary artery disease. Making changes to improve your heart condition will help you stay healthy and active. Follow-up care is a key part of your treatment and safety. Be sure to make and go to all appointments, and call your doctor if you are having problems. It's also a good idea to know your test results and keep a list of the medicines you take. How can you care for yourself at home? Medicines · Take your medicines exactly as prescribed. Call your doctor if you think you are having a problem with your medicine. You will get more details on the specific medicines your doctor prescribes. · If your doctor has given you a blood thinner to prevent a stroke, be sure you get instructions about how to take your medicine safely. Blood thinners can cause serious bleeding problems. · Do not take any vitamins, over-the-counter drugs, or herbal products without talking to your doctor first. 
Lifestyle changes · Do not smoke. Smoking can increase your chance of a stroke and heart attack. If you need help quitting, talk to your doctor about stop-smoking programs and medicines. These can increase your chances of quitting for good. · Eat a heart-healthy diet. · Stay at a healthy weight. Lose weight if you need to. · Limit alcohol to 2 drinks a day for men and 1 drink a day for women. Too much alcohol can cause health problems. · Avoid colds and flu. Get a pneumococcal vaccine shot.  If you have had one before, ask your doctor whether you need another dose. Get a flu shot every year. If you must be around people with colds or flu, wash your hands often. Activity · If your doctor recommends it, get more exercise. Walking is a good choice. Bit by bit, increase the amount you walk every day. Try for at least 30 minutes on most days of the week. You also may want to swim, bike, or do other activities. Your doctor may suggest that you join a cardiac rehabilitation program so that you can have help increasing your physical activity safely. · Start light exercise if your doctor says it is okay. Even a small amount will help you get stronger, have more energy, and manage stress. Walking is an easy way to get exercise. Start out by walking a little more than you did in the hospital. Gradually increase the amount you walk. · When you exercise, watch for signs that your heart is working too hard. You are pushing too hard if you cannot talk while you are exercising. If you become short of breath or dizzy or have chest pain, sit down and rest immediately. · Check your pulse regularly. Place two fingers on the artery at the palm side of your wrist, in line with your thumb. If your heartbeat seems uneven or fast, talk to your doctor. When should you call for help? [id number] anytime you think you may need emergency care. For example, call if: 
· You have symptoms of a heart attack. These may include: 
? Chest pain or pressure, or a strange feeling in the chest. 
? Sweating. ? Shortness of breath. ? Nausea or vomiting. ? Pain, pressure, or a strange feeling in the back, neck, jaw, or upper belly or in one or both shoulders or arms. ? Lightheadedness or sudden weakness. ? A fast or irregular heartbeat. After you call 911, the  may tell you to chew 1 adult-strength or 2 to 4 low-dose aspirin. Wait for an ambulance. Do not try to drive yourself. · You have symptoms of a stroke. These may include: ? Sudden numbness, tingling, weakness, or loss of movement in your face, arm, or leg, especially on only one side of your body. ? Sudden vision changes. ? Sudden trouble speaking. ? Sudden confusion or trouble understanding simple statements. ? Sudden problems with walking or balance. ? A sudden, severe headache that is different from past headaches. · You passed out (lost consciousness). Call your doctor now or seek immediate medical care if: 
· You have new or increased shortness of breath. · You feel dizzy or lightheaded, or you feel like you may faint. · Your heart rate becomes irregular. · You can feel your heart flutter in your chest or skip heartbeats. Tell your doctor if these symptoms are new or worse. Watch closely for changes in your health, and be sure to contact your doctor if you have any problems. Where can you learn more? Go to http://pablo-cyndi.info/ Enter U020 in the search box to learn more about \"Atrial Fibrillation: Care Instructions. \" Current as of: December 16, 2019               Content Version: 12.5 © 0051-3820 [employer]. Care instructions adapted under license by [employer] (which disclaims liability or warranty for this information). If you have questions about a medical condition or this instruction, always ask your healthcare professional. [address] any warranty or liability for your use of this information.

## 2022-02-10 ENCOUNTER — OFFICE VISIT (OUTPATIENT)
Dept: FAMILY MEDICINE CLINIC | Age: 67
End: 2022-02-10
Payer: MEDICARE

## 2022-02-10 VITALS
WEIGHT: 270.2 LBS | SYSTOLIC BLOOD PRESSURE: 129 MMHG | OXYGEN SATURATION: 97 % | HEART RATE: 65 BPM | HEIGHT: 71 IN | DIASTOLIC BLOOD PRESSURE: 71 MMHG | TEMPERATURE: 98.2 F | BODY MASS INDEX: 37.83 KG/M2

## 2022-02-10 DIAGNOSIS — F33.0 MILD EPISODE OF RECURRENT MAJOR DEPRESSIVE DISORDER (HCC): Primary | ICD-10-CM

## 2022-02-10 DIAGNOSIS — I50.33 DIASTOLIC CHF, ACUTE ON CHRONIC (HCC): ICD-10-CM

## 2022-02-10 DIAGNOSIS — I10 ESSENTIAL HYPERTENSION: ICD-10-CM

## 2022-02-10 PROCEDURE — G8752 SYS BP LESS 140: HCPCS | Performed by: STUDENT IN AN ORGANIZED HEALTH CARE EDUCATION/TRAINING PROGRAM

## 2022-02-10 PROCEDURE — G8510 SCR DEP NEG, NO PLAN REQD: HCPCS | Performed by: STUDENT IN AN ORGANIZED HEALTH CARE EDUCATION/TRAINING PROGRAM

## 2022-02-10 PROCEDURE — G8536 NO DOC ELDER MAL SCRN: HCPCS | Performed by: STUDENT IN AN ORGANIZED HEALTH CARE EDUCATION/TRAINING PROGRAM

## 2022-02-10 PROCEDURE — G8754 DIAS BP LESS 90: HCPCS | Performed by: STUDENT IN AN ORGANIZED HEALTH CARE EDUCATION/TRAINING PROGRAM

## 2022-02-10 PROCEDURE — 3017F COLORECTAL CA SCREEN DOC REV: CPT | Performed by: STUDENT IN AN ORGANIZED HEALTH CARE EDUCATION/TRAINING PROGRAM

## 2022-02-10 PROCEDURE — 1101F PT FALLS ASSESS-DOCD LE1/YR: CPT | Performed by: STUDENT IN AN ORGANIZED HEALTH CARE EDUCATION/TRAINING PROGRAM

## 2022-02-10 PROCEDURE — 99214 OFFICE O/P EST MOD 30 MIN: CPT | Performed by: STUDENT IN AN ORGANIZED HEALTH CARE EDUCATION/TRAINING PROGRAM

## 2022-02-10 PROCEDURE — G8417 CALC BMI ABV UP PARAM F/U: HCPCS | Performed by: STUDENT IN AN ORGANIZED HEALTH CARE EDUCATION/TRAINING PROGRAM

## 2022-02-10 PROCEDURE — G8427 DOCREV CUR MEDS BY ELIG CLIN: HCPCS | Performed by: STUDENT IN AN ORGANIZED HEALTH CARE EDUCATION/TRAINING PROGRAM

## 2022-02-10 NOTE — PROGRESS NOTES
Nette Viramontes presents today for   Chief Complaint   Patient presents with    Follow-up     zoloft        Is someone accompanying this pt? No     Is the patient using any DME equipment during OV? No     Depression Screening:  3 most recent PHQ Screens 2/10/2022   Little interest or pleasure in doing things Not at all   Feeling down, depressed, irritable, or hopeless Not at all   Total Score PHQ 2 0   Trouble falling or staying asleep, or sleeping too much -   Feeling tired or having little energy -   Poor appetite, weight loss, or overeating -   Feeling bad about yourself - or that you are a failure or have let yourself or your family down -   Trouble concentrating on things such as school, work, reading, or watching TV -   Moving or speaking so slowly that other people could have noticed; or the opposite being so fidgety that others notice -   Thoughts of being better off dead, or hurting yourself in some way -   PHQ 9 Score -   How difficult have these problems made it for you to do your work, take care of your home and get along with others -       Learning Assessment:  Learning Assessment 6/27/2017   PRIMARY LEARNER Patient   PRIMARY LANGUAGE ENGLISH   LEARNER PREFERENCE PRIMARY LISTENING   ANSWERED BY patient   RELATIONSHIP SELF       Fall Risk  Fall Risk Assessment, last 12 mths 9/24/2021   Able to walk? Yes   Fall in past 12 months? 0   Do you feel unsteady? -   Are you worried about falling -       Health Maintenance reviewed and discussed and ordered per Provider. Health Maintenance Due   Topic Date Due    DTaP/Tdap/Td series (1 - Tdap) Never done    Shingrix Vaccine Age 50> (1 of 2) Never done    Pneumococcal 65+ years (1 of 1 - PPSV23) Never done    Medicare Yearly Exam  Never done    Flu Vaccine (1) 09/01/2021   . Coordination of Care:    1. \"Have you been to the ER, urgent care clinic since your last visit? Hospitalized since your last visit? \" No    2.  \"Have you seen or consulted any other health care providers outside of the [address] [place] [address] since your last visit? \" No     3. For patients aged 39-70: Has the patient had a colonoscopy? Yes - no Care Gap present     If the patient is female:    4. For patients aged 41-77: Has the patient had a mammogram within the past 2 years? NA - based on age/sex    5. For patients aged 21-65: Has the patient had a pap smear?  NA - based on age/sex

## 2022-02-14 NOTE — PROGRESS NOTES
Subjective:   Gagan Gruber is a 73 y.o. male who was seen for Follow-up (zoloft )    Patient here for follow-up for depression. He states he has some side effects on Zoloft and would like to be off of it. His depression is pretty mild and is able to function at work. He is compliant with all his other medications. Home Medications    Medication Sig Start Date End Date Taking? Authorizing Provider   pantoprazole (PROTONIX) 40 mg tablet 40 mg. 21  Yes Provider, Historical   colchicine 0.6 mg tablet TAKE 1 TABLET BY MOUTH DAILY 22  Yes Brannon Boyer NP   rosuvastatin (CRESTOR) 10 mg tablet TAKE 1 TABLET BY MOUTH EVERY EVENING 22  Yes Brannon Boyer NP   MILK THISTLE PO Take  by mouth. daily   Yes Provider, Historical   losartan (COZAAR) 100 mg tablet Take 1 Tablet by mouth daily. 11/15/21  Yes Brannon Boyer NP   tadalafiL (Cialis) 2.5 mg tablet Take 2.5 mg by mouth daily. Yes Provider, Historical   furosemide (LASIX) 20 mg tablet Take 1 Tablet by mouth daily. 21  Yes Brannon Boyer NP   co-enzyme Q-10 (Co Q-10) 100 mg capsule Take 100 mg by mouth daily. Yes Provider, Historical   aspirin 81 mg chewable tablet Take 1 Tab by mouth daily. 20  Yes Evgeny FriMD luis enrique      No Known Allergies  Social History     Tobacco Use    Smoking status: Former Smoker     Quit date:      Years since quittin.1    Smokeless tobacco: Former User    Tobacco comment: Remotely quit tobacco use    Substance Use Topics    Alcohol use: Yes     Comment: Occasional    Drug use: Never            Review of Systems   All other systems reviewed and are negative.          Objective:     Visit Vitals  /71 (BP 1 Location: Left upper arm, BP Patient Position: Sitting, BP Cuff Size: Adult)   Pulse 65   Temp 98.2 °F (36.8 °C) (Oral)   Ht 5' 11\" (1.803 m)   Wt 270 lb 3.2 oz (122.6 kg)   SpO2 97%   BMI 37.69 kg/m²        General: alert, oriented, not in distress  Head: scalp normal, atraumatic  Eyes: pupils are equal and reactive, full and intact EOM's  Ears: patent ear canal, intact tympanic membrane  Nose: normal turbinates, no congestion or discharge  Lips/Mouth: moist lips and buccal mucosa, non-enlarged tonsils, pink throat  Neck: supple, no JVD, no lymphadenopathy, non-palpable thyroid  Chest/Lungs: clear breath sounds, no wheezing or crackles  Heart: normal rate, regular rhythm, no murmur  Abdomen: soft, non-distended, non-tender, normal bowel sounds, no organomegaly, no masses  Extremities: no focal deformities, no edema  Skin: no active skin lesions    Laboratory/Tests:  Hospital Outpatient Visit on 01/25/2022   Component Date Value Ref Range Status    LIPID PROFILE 01/25/2022      Final    Cholesterol, total 01/25/2022 154  <200 mg/dL Final    Triglyceride 01/25/2022 217* <150 mg/dL Final    Comment: The drugs N-acetylcysteine (NAC) and  Metamiszole have been found to cause falsely  low results in this chemical assay. Please  be sure to submit blood samples obtained  BEFORE administration of either of these  drugs to assure correct results.       HDL Cholesterol 01/25/2022 48  40 - 60 mg/dL Final    LDL, calculated 01/25/2022 62.6  0 - 100 mg/dL Final    VLDL, calculated 01/25/2022 43.4  mg/dL Final    CHOL/HDL Ratio 01/25/2022 3.2  0 - 5.0   Final   Hospital Outpatient Visit on 10/19/2021   Component Date Value Ref Range Status    LA Major Axis 10/19/2021 6.00  cm Final    LA Area 2C 10/19/2021 19.20  cm2 Final    LA Area 4C 10/19/2021 22.10  cm2 Final    LA Volume DISK BP 10/19/2021 65.70  18.0 - 58.0 cm3 Final    LA Major Axis 10/19/2021 4.60  cm Final    LA/AO Root Ratio 10/19/2021 1.44   Final    RA Area 4C 10/19/2021 21.70  cm2 Final    RA Major Axis 10/19/2021 5.79  cm Final    Est. RA Pressure 10/19/2021 3.00  mmHg Final    RA Minor Axis 10/19/2021 4.90  cm Final    AV Cusp Mmode 10/19/2021 1.90  cm Final    AV Mean Gradient 10/19/2021 5.00  mmHg Final    AV VTI 10/19/2021 31.50  cm Final    AV Mean Velocity 10/19/2021 107.00  cm/s Final    AV Peak Velocity 10/19/2021 151.00  cm/s Final    AV Peak Gradient 10/19/2021 9.00  mmHg Final    AV Area by VTI 10/19/2021 3.15  cm2 Final    AV Area by Peak Velocity 10/19/2021 3.47  cm2 Final    CHRISTA/BSA 10/19/2021 1.33   Final    Aortic Root 10/19/2021 3.20  cm Final    Ascending Aorta 10/19/2021 3.40  cm Final    IVSd 10/19/2021 1.10* 0.60 - 1.00 cm Final    LVIDd 10/19/2021 5.20  4. 20 - 5.90 cm Final    LVIDs 10/19/2021 3.40  cm Final    LVOT Diameter 10/19/2021 2.20  cm Final    Left Ventricular Outflow Tract Radha* 10/19/2021 4.00  mmHg Final    LVOT VTI 10/19/2021 26.10  cm Final    LVOT VTI 10/19/2021 24.30  cm Final    LVOT VTI 10/19/2021 27.90  cm Final    LVOT Peak Velocity 10/19/2021 138.00  cm/s Final    LVOT Peak Gradient 10/19/2021 8.00  mmHg Final    LVPWd 10/19/2021 1.00  0.60 - 1.00 cm Final    LV E' Lateral Velocity 10/19/2021 13.60  cm/s Final    LV E' Septal Velocity 10/19/2021 6.42  cm/s Final    LV EDV A2C 10/19/2021 141.00  cm3 Final    LV ESV A2C 10/19/2021 39.30  cm3 Final    E/E' Lateral 10/19/2021 7.87   Final    E/E' Septal 10/19/2021 16.67   Final    LVOT SV 10/19/2021 99.0  cm3 Final    Mitral Valve Deceleration [place] 10/19/2021 4,480.00  mm/s2 Final    Mitral Valve Deceleration [place] 10/19/2021 4,480.00  mm/s2 Final    MV E Wave Deceleration Time 10/19/2021 239.00  ms Final    MV PHT 10/19/2021 70.00  ms Final    MV A Velocity 10/19/2021 77.60  cm/s Final    MV E Velocity 10/19/2021 107.00  cm/s Final    MV Mean Gradient 10/19/2021 2.00  mmHg Final    MV VTI 10/19/2021 24.30  cm Final    MV Max Velocity 10/19/2021 91.80  cm/s Final    MV Peak Gradient 10/19/2021 3.00  mmHg Final    MV Area by PHT 10/19/2021 3.14  cm2 Final    MV E/A 10/19/2021 1.38   Final    RVIDd 10/19/2021 3.30  cm Final    RVSP 10/19/2021 25.00  mmHg Final    TAPSE 10/19/2021 1.93  1.50 - 2.00 cm Final    TR Max Velocity 10/19/2021 233.00  cm/s Final    TV MG 10/19/2021 22.00  mmHg Final    EF BP 10/19/2021 63.5  55.0 - 100.0 % Final    LV Mass 2D 10/19/2021 207.3  88.0 - 224.0 g Final    LV Mass 2D Index 10/19/2021 87.8  49.0 - 115.0 g/m2 Final    E/E' Ratio (Averaged) 10/19/2021 12.27   Final    CHRISTA/BSA Peak Velocity 10/19/2021 1.5  cm2/m2 Final    CHRISTA/BSA VTI 10/19/2021 1.3  cm2/m2 Final   Hospital Outpatient Visit on 09/30/2021   Component Date Value Ref Range Status    Sodium 09/30/2021 137  135 - 145 mmol/L Final    Potassium 09/30/2021 4.0  3.2 - 5.1 mmol/L Final    Chloride 09/30/2021 107  94 - 111 mmol/L Final    CO2 09/30/2021 21  21 - 33 mmol/L Final    Anion gap 09/30/2021 9  mmol/L Final    Glucose 09/30/2021 95  70 - 110 mg/dL Final    BUN 09/30/2021 19  9 - 21 mg/dL Final    Creatinine 09/30/2021 1.00  0.8 - 1.50 mg/dL Final    BUN/Creatinine ratio 09/30/2021 19    Final    GFR est AA 09/30/2021 >60  ml/min/1.73m2 Final    GFR est non-AA 09/30/2021 >60  ml/min/1.73m2 Final    Comment: Estimated GFR is calculated using the IDMS-traceable Modification of Diet in Renal Disease (MDRD) Study equation, reported for both  Americans (GFRAA) and non- Americans (GFRNA), and normalized to 1.73m2 body surface area. The physician must decide which value applies to the patient. The MDRD study equation should only be used in individuals age 25 or older. It has not been validated for the following: pregnant women, patients with serious comorbid conditions, or on certain medications, or persons with extremes of body size, muscle mass, or nutritional status.  Calcium 09/30/2021 8.8  8.5 - 10.5 mg/dL Final    Bilirubin, total 09/30/2021 2.2* 0.2 - 1.0 mg/dL Final    AST (SGOT) 09/30/2021 107* 17 - 74 U/L Final    ALT (SGPT) 09/30/2021 102* 3 - 72 U/L Final    Alk.  phosphatase 09/30/2021 144* 38 - 126 U/L Final    Protein, total 09/30/2021 7.0  6.1 - 8.4 g/dL Final    Albumin 09/30/2021 3.9  3.5 - 4.7 g/dL Final    Globulin 09/30/2021 3.1  g/dL Final    A-G Ratio 09/30/2021 1.3    Final    WBC 09/30/2021 5.5  4.6 - 13.2 K/uL Final    RBC 09/30/2021 4.72  4.35 - 5.65 M/uL Final    HGB 09/30/2021 16.3* 13.0 - 16.0 g/dL Final    HCT 09/30/2021 45.6  36.0 - 48.0 % Final    MCV 09/30/2021 96.6  78.0 - 100.0 FL Final    MCH 09/30/2021 34.5* 24.0 - 34.0 PG Final    MCHC 09/30/2021 35.7  31.0 - 37.0 g/dL Final    RDW 09/30/2021 12.4  11.6 - 14.5 % Final    PLATELET 99/59/9410 82* 135 - 420 K/uL Final    MPV 09/30/2021 10.7  9.2 - 11.8 FL Final    NRBC 09/30/2021 0.0  0.0  WBC Final    ABSOLUTE NRBC 09/30/2021 0.00  0.00 - 0.01 K/uL Final   Hospital Outpatient Visit on 08/16/2021   Component Date Value Ref Range Status    WBC 08/16/2021 5.2  4.6 - 13.2 K/uL Final    RBC 08/16/2021 4.38* 4.70 - 5.50 M/uL Final    HGB 08/16/2021 15.1  13.0 - 16.0 g/dL Final    HCT 08/16/2021 43.0  36.0 - 48.0 % Final    MCV 08/16/2021 98.2* 74.0 - 97.0 FL Final    MCH 08/16/2021 34.5* 24.0 - 34.0 PG Final    MCHC 08/16/2021 35.1  31.0 - 37.0 g/dL Final    RDW 08/16/2021 13.1  11.6 - 14.5 % Final    PLATELET 13/26/0531 85* 135 - 420 K/uL Final    MPV 08/16/2021 10.6  9.2 - 11.8 FL Final    Sodium 08/16/2021 137  135 - 145 mmol/L Final    Potassium 08/16/2021 3.9  3.2 - 5.1 mmol/L Final    Chloride 08/16/2021 106  94 - 111 mmol/L Final    CO2 08/16/2021 23  21 - 33 mmol/L Final    Anion gap 08/16/2021 8  mmol/L Final    Glucose 08/16/2021 123* 70 - 110 mg/dL Final    BUN 08/16/2021 12  9 - 21 mg/dL Final    Creatinine 08/16/2021 0.70* 0.8 - 1.50 mg/dL Final    BUN/Creatinine ratio 08/16/2021 17    Final    GFR est AA 08/16/2021 >60  ml/min/1.73m2 Final    GFR est non-AA 08/16/2021 >60  ml/min/1.73m2 Final    Comment: Estimated GFR is calculated using the IDMS-traceable Modification of Diet in Renal Disease (MDRD) Study equation, reported for both  Americans (GFRAA) and non- Americans (GFRNA), and normalized to 1.73m2 body surface area. The physician must decide which value applies to the patient. The MDRD study equation should only be used in individuals age 25 or older. It has not been validated for the following: pregnant women, patients with serious comorbid conditions, or on certain medications, or persons with extremes of body size, muscle mass, or nutritional status.  Calcium 08/16/2021 8.7  8.5 - 10.5 mg/dL Final    Bilirubin, total 08/16/2021 2.1* 0.2 - 1.0 mg/dL Final    AST (SGOT) 08/16/2021 95* 17 - 74 U/L Final    ALT (SGPT) 08/16/2021 92* 3 - 72 U/L Final    Alk. phosphatase 08/16/2021 149* 38 - 126 U/L Final    Protein, total 08/16/2021 6.6  6.1 - 8.4 g/dL Final    Albumin 08/16/2021 3.7  3.5 - 4.7 g/dL Final    Globulin 08/16/2021 2.9  g/dL Final    A-G Ratio 08/16/2021 1.3    Final   Clinical Support on 04/06/2021   Component Date Value Ref Range Status    Testosterone 04/06/2021 421  264 - 916 ng/dL Final    Comment: Adult male reference interval is based on a population of  healthy nonobese males (BMI <30) between 23and 44years old. [address] [place] [address],[id number]. PMID: [id number].  Protein, total 04/06/2021 6.2  6.0 - 8.5 g/dL Final    Albumin 04/06/2021 3.5* 3.8 - 4.8 g/dL Final    Bilirubin, total 04/06/2021 1.2  0.0 - 1.2 mg/dL Final    Bilirubin, direct 04/06/2021 0.44* 0.00 - 0.40 mg/dL Final    Alk. phosphatase 04/06/2021 194* 39 - 117 IU/L Final    AST (SGOT) 04/06/2021 79* 0 - 40 IU/L Final    ALT (SGPT) 04/06/2021 74* 0 - 44 IU/L Final   Office Visit on 03/22/2021   Component Date Value Ref Range Status    Testosterone 03/22/2021 458  264 - 916 ng/dL Final    Comment: Adult male reference interval is based on a population of  healthy nonobese males (BMI <30) between 23and 44years old. [address] [place] [address], [address] [place] [address] [phone]. PMID: [id number].       Prostate Specific Ag 03/22/2021 0.2 0.0 - 4.0 ng/mL Final    Comment: Roche ECLIA methodology. According to the American Urological Association, Serum PSA should  decrease and remain at undetectable levels after radical  prostatectomy. The AUA defines biochemical recurrence as an initial  PSA value 0.2 ng/mL or greater followed by a subsequent confirmatory  PSA value 0.2 ng/mL or greater. Values obtained with different assay methods or kits cannot be used  interchangeably. Results cannot be interpreted as absolute evidence  of the presence or absence of malignant disease.  Albumin 03/22/2021 4.1  3.8 - 4.8 g/dL Final    Sex Hormone Binding Globulin 03/22/2021 123.0* 19.3 - 76.4 nmol/L Final         Assessment & Plan:     1. Mild episode of recurrent major depressive disorder ([facility] [address]  Had side effects of Zoloft. Will discontinue. Continue to monitor symptoms    2. Essential hypertension  Continue losartan and Lasix    3. Diastolic CHF, acute on chronic (HCC)  Continue Lasix             I have discussed the diagnosis with the patient and the intended plan as seen in the above orders. The patient has received an after-visit summary and questions were answered concerning future plans. I have discussed medication side effects and warnings with the patient as well. I have reviewed the plan of care with the patient, accepted their input and they are in agreement with the treatment goals. Previous lab and imaging results were reviewed by me.        Jamel Farrar MD  February 14, 2022

## 2022-03-18 PROBLEM — I25.10 CAD S/P PERCUTANEOUS CORONARY ANGIOPLASTY: Status: ACTIVE | Noted: 2020-01-22

## 2022-03-18 PROBLEM — Z98.61 CAD S/P PERCUTANEOUS CORONARY ANGIOPLASTY: Status: ACTIVE | Noted: 2020-01-22

## 2022-03-18 PROBLEM — I31.4 CARDIAC TAMPONADE: Status: ACTIVE | Noted: 2020-01-20

## 2022-03-18 PROBLEM — N40.1 BENIGN PROSTATIC HYPERPLASIA WITH LOWER URINARY TRACT SYMPTOMS: Status: ACTIVE | Noted: 2021-08-16

## 2022-03-19 PROBLEM — N52.9 ERECTILE DYSFUNCTION: Status: ACTIVE | Noted: 2021-08-16

## 2022-03-19 PROBLEM — E66.01 SEVERE OBESITY (HCC): Status: ACTIVE | Noted: 2020-02-26

## 2022-03-19 PROBLEM — J90 PLEURAL EFFUSION, LEFT: Status: ACTIVE | Noted: 2020-01-20

## 2022-03-19 PROBLEM — G47.33 OBSTRUCTIVE SLEEP APNEA: Status: ACTIVE | Noted: 2020-01-20

## 2022-03-19 PROBLEM — I31.4 PERICARDIAL EFFUSION WITH CARDIAC TAMPONADE: Status: ACTIVE | Noted: 2020-01-20

## 2022-03-19 PROBLEM — D23.30 BENIGN NEOPLASM OF SKIN OF FACE: Status: ACTIVE | Noted: 2021-08-16

## 2022-03-19 PROBLEM — Z12.11 ENCOUNTER FOR SCREENING COLONOSCOPY: Status: ACTIVE | Noted: 2021-08-16

## 2022-03-19 PROBLEM — I48.0 PAROXYSMAL ATRIAL FIBRILLATION (HCC): Status: ACTIVE | Noted: 2020-01-08

## 2022-03-19 PROBLEM — B18.2 CHRONIC HEPATITIS C WITHOUT HEPATIC COMA (HCC): Status: ACTIVE | Noted: 2021-08-16

## 2022-03-19 PROBLEM — I31.39 PERICARDIAL EFFUSION WITH CARDIAC TAMPONADE: Status: ACTIVE | Noted: 2020-01-20

## 2022-03-19 PROBLEM — I10 ESSENTIAL HYPERTENSION: Status: ACTIVE | Noted: 2021-08-16

## 2022-03-21 DIAGNOSIS — I30.9 ACUTE PERICARDITIS, UNSPECIFIED TYPE: ICD-10-CM

## 2022-03-22 RX ORDER — COLCHICINE 0.6 MG/1
TABLET ORAL
Qty: 60 TABLET | Refills: 0 | Status: SHIPPED | OUTPATIENT
Start: 2022-03-22 | End: 2022-08-01

## 2022-04-14 ENCOUNTER — TRANSCRIBE ORDER (OUTPATIENT)
Dept: SCHEDULING | Age: 67
End: 2022-04-14

## 2022-04-14 DIAGNOSIS — K74.60 CIRRHOSIS (HCC): Primary | ICD-10-CM

## 2022-05-12 NOTE — PERIOP NOTES
PRE-SURGICAL INSTRUCTIONS        Patient's Name:  Mounika Barnes      Today's Date:  5/12/2022            Covid Testing Date and Time:    Surgery Date:  5/16/2022                1. Do NOT eat or drink anything, including candy, gum, or ice chips after midnight on 5/16, unless you have specific instructions from your surgeon or anesthesia provider to do so.  2. You may brush your teeth before coming to the hospital.  3. No smoking 24 hours prior to the day of surgery. 4. No alcohol 24 hours prior to the day of surgery. 5. No recreational drugs for one week prior to the day of surgery. 6. Leave all valuables, including money/purse, at home. 7. Remove all jewelry, nail polish, acrylic nails, and makeup (including mascara); no lotions powders, deodorant, or perfume/cologne/after shave on the skin. 8. Follow instruction for Hibiclens washes and CHG wipes from surgeon's office. 9. Glasses/contact lenses and dentures may be worn to the hospital.  They will be removed prior to surgery. 10. Call your doctor if symptoms of a cold or illness develop within 24-48 hours prior to your surgery. 11.  If you are having an outpatient procedure, please make arrangements for a responsible ADULT TO [address] [place] [address] and stay with you for 24 hours after your surgery. 12. ONE VISITOR in the hospital at this time for outpatient procedures. Exceptions may be made for surgical admissions, per nursing unit guidelines      Special Instructions:      Bring list of CURRENT medications. .  Bring any pertinent legal medical records. Take these medications the morning of surgery with a sip of water:  Per office    Complete bowel prep per MD instructions. On the day of surgery, come in the main entrance of [facility]. Let the  at the desk know you are there for surgery. A staff member will come escort you to the surgical area on the second floor.     If you have any questions or concerns, please do not hesitate to call:     (Prior to the day of surgery) [facility] department:  [phone]   (Day of surgery) Pre-Op department:  [phone]    These surgical instructions were reviewed with the patient during the PAT phone call.

## 2022-05-15 ENCOUNTER — ANESTHESIA EVENT (OUTPATIENT)
Dept: ENDOSCOPY | Age: 67
End: 2022-05-15
Payer: MEDICARE

## 2022-05-16 ENCOUNTER — ANESTHESIA (OUTPATIENT)
Dept: ENDOSCOPY | Age: 67
End: 2022-05-16
Payer: MEDICARE

## 2022-05-16 ENCOUNTER — HOSPITAL ENCOUNTER (OUTPATIENT)
Age: 67
Setting detail: OUTPATIENT SURGERY
Discharge: HOME | End: 2022-05-16
Attending: INTERNAL MEDICINE | Admitting: INTERNAL MEDICINE
Payer: MEDICARE

## 2022-05-16 VITALS
DIASTOLIC BLOOD PRESSURE: 71 MMHG | TEMPERATURE: 97.3 F | HEART RATE: 70 BPM | RESPIRATION RATE: 14 BRPM | HEIGHT: 70 IN | SYSTOLIC BLOOD PRESSURE: 144 MMHG | OXYGEN SATURATION: 95 % | WEIGHT: 264 LBS | BODY MASS INDEX: 37.8 KG/M2

## 2022-05-16 PROCEDURE — 74011250637 HC RX REV CODE- 250/637: Performed by: INTERNAL MEDICINE

## 2022-05-16 PROCEDURE — 88305 TISSUE EXAM BY PATHOLOGIST: CPT

## 2022-05-16 PROCEDURE — 74011250636 HC RX REV CODE- 250/636: Performed by: NURSE ANESTHETIST, CERTIFIED REGISTERED

## 2022-05-16 PROCEDURE — 74011000250 HC RX REV CODE- 250: Performed by: ANESTHESIOLOGY

## 2022-05-16 PROCEDURE — 00811 ANES LWR INTST NDSC NOS: CPT | Performed by: ANESTHESIOLOGY

## 2022-05-16 PROCEDURE — 2709999900 HC NON-CHARGEABLE SUPPLY: Performed by: INTERNAL MEDICINE

## 2022-05-16 PROCEDURE — 76060000032 HC ANESTHESIA 0.5 TO 1 HR: Performed by: INTERNAL MEDICINE

## 2022-05-16 PROCEDURE — 77030013992 HC SNR POLYP ENDOSC BSC -B: Performed by: INTERNAL MEDICINE

## 2022-05-16 PROCEDURE — 76040000007: Performed by: INTERNAL MEDICINE

## 2022-05-16 PROCEDURE — 74011250637 HC RX REV CODE- 250/637: Performed by: NURSE ANESTHETIST, CERTIFIED REGISTERED

## 2022-05-16 PROCEDURE — 77030008565 HC TBNG SUC IRR ERBE -B: Performed by: INTERNAL MEDICINE

## 2022-05-16 PROCEDURE — 74011250636 HC RX REV CODE- 250/636: Performed by: ANESTHESIOLOGY

## 2022-05-16 RX ORDER — FAMOTIDINE 20 MG/1
20 TABLET, FILM COATED ORAL ONCE
Status: DISCONTINUED | OUTPATIENT
Start: 2022-05-17 | End: 2022-05-16 | Stop reason: HOSPADM

## 2022-05-16 RX ORDER — MAGNESIUM SULFATE 100 %
4 CRYSTALS MISCELLANEOUS AS NEEDED
Status: CANCELLED | OUTPATIENT
Start: 2022-05-16

## 2022-05-16 RX ORDER — SODIUM CHLORIDE, SODIUM LACTATE, POTASSIUM CHLORIDE, CALCIUM CHLORIDE 600; 310; 30; 20 MG/100ML; MG/100ML; MG/100ML; MG/100ML
50 INJECTION, SOLUTION INTRAVENOUS CONTINUOUS
Status: DISCONTINUED | OUTPATIENT
Start: 2022-05-17 | End: 2022-05-16 | Stop reason: HOSPADM

## 2022-05-16 RX ORDER — PROPOFOL 10 MG/ML
INJECTION, EMULSION INTRAVENOUS AS NEEDED
Status: DISCONTINUED | OUTPATIENT
Start: 2022-05-16 | End: 2022-05-16 | Stop reason: HOSPADM

## 2022-05-16 RX ORDER — DEXTROMETHORPHAN/PSEUDOEPHED 2.5-7.5/.8
DROPS ORAL AS NEEDED
Status: DISCONTINUED | OUTPATIENT
Start: 2022-05-16 | End: 2022-05-16 | Stop reason: HOSPADM

## 2022-05-16 RX ORDER — SODIUM CHLORIDE 0.9 % (FLUSH) 0.9 %
5-40 SYRINGE (ML) INJECTION AS NEEDED
Status: CANCELLED | OUTPATIENT
Start: 2022-05-16

## 2022-05-16 RX ORDER — LIDOCAINE HYDROCHLORIDE 10 MG/ML
0.1 INJECTION, SOLUTION EPIDURAL; INFILTRATION; INTRACAUDAL; PERINEURAL AS NEEDED
Status: DISCONTINUED | OUTPATIENT
Start: 2022-05-16 | End: 2022-05-16 | Stop reason: HOSPADM

## 2022-05-16 RX ORDER — SODIUM CHLORIDE, SODIUM LACTATE, POTASSIUM CHLORIDE, CALCIUM CHLORIDE 600; 310; 30; 20 MG/100ML; MG/100ML; MG/100ML; MG/100ML
INJECTION, SOLUTION INTRAVENOUS
Status: DISCONTINUED | OUTPATIENT
Start: 2022-05-16 | End: 2022-05-16 | Stop reason: HOSPADM

## 2022-05-16 RX ORDER — FAMOTIDINE 20 MG/1
20 TABLET, FILM COATED ORAL ONCE
Status: COMPLETED | OUTPATIENT
Start: 2022-05-16 | End: 2022-05-16

## 2022-05-16 RX ORDER — SODIUM CHLORIDE 0.9 % (FLUSH) 0.9 %
5-40 SYRINGE (ML) INJECTION EVERY 8 HOURS
Status: CANCELLED | OUTPATIENT
Start: 2022-05-16

## 2022-05-16 RX ORDER — SODIUM CHLORIDE, SODIUM LACTATE, POTASSIUM CHLORIDE, CALCIUM CHLORIDE 600; 310; 30; 20 MG/100ML; MG/100ML; MG/100ML; MG/100ML
50 INJECTION, SOLUTION INTRAVENOUS CONTINUOUS
Status: DISCONTINUED | OUTPATIENT
Start: 2022-05-16 | End: 2022-05-16 | Stop reason: HOSPADM

## 2022-05-16 RX ORDER — LIDOCAINE HYDROCHLORIDE 20 MG/ML
INJECTION, SOLUTION EPIDURAL; INFILTRATION; INTRACAUDAL; PERINEURAL AS NEEDED
Status: DISCONTINUED | OUTPATIENT
Start: 2022-05-16 | End: 2022-05-16 | Stop reason: HOSPADM

## 2022-05-16 RX ADMIN — PROPOFOL 30 MG: 10 INJECTION, EMULSION INTRAVENOUS at 07:38

## 2022-05-16 RX ADMIN — SODIUM CHLORIDE, SODIUM LACTATE, POTASSIUM CHLORIDE, AND CALCIUM CHLORIDE: 600; 310; 30; 20 INJECTION, SOLUTION INTRAVENOUS at 07:27

## 2022-05-16 RX ADMIN — PROPOFOL 30 MG: 10 INJECTION, EMULSION INTRAVENOUS at 07:32

## 2022-05-16 RX ADMIN — PROPOFOL 30 MG: 10 INJECTION, EMULSION INTRAVENOUS at 07:48

## 2022-05-16 RX ADMIN — PROPOFOL 100 MG: 10 INJECTION, EMULSION INTRAVENOUS at 07:31

## 2022-05-16 RX ADMIN — PROPOFOL 30 MG: 10 INJECTION, EMULSION INTRAVENOUS at 07:35

## 2022-05-16 RX ADMIN — SODIUM CHLORIDE, SODIUM LACTATE, POTASSIUM CHLORIDE, AND CALCIUM CHLORIDE 50 ML/HR: 600; 310; 30; 20 INJECTION, SOLUTION INTRAVENOUS at 07:03

## 2022-05-16 RX ADMIN — FAMOTIDINE 20 MG: 20 TABLET ORAL at 07:03

## 2022-05-16 RX ADMIN — PROPOFOL 30 MG: 10 INJECTION, EMULSION INTRAVENOUS at 07:42

## 2022-05-16 RX ADMIN — PROPOFOL 30 MG: 10 INJECTION, EMULSION INTRAVENOUS at 07:46

## 2022-05-16 RX ADMIN — LIDOCAINE HYDROCHLORIDE 80 MG: 20 INJECTION, SOLUTION EPIDURAL; INFILTRATION; INTRACAUDAL; PERINEURAL at 07:31

## 2022-05-16 NOTE — PROGRESS NOTES
conducted a pre-procedural visit with Latha Pantoja, who is a 73 y. o.,male. The  provided the following Interventions:  Initiated a relationship of care and support. Offered prayer and assurance of continued prayers on patient's behalf. There is no advance directive present    Plan:  Chaplains will continue to follow and will provide pastoral care on an as needed/requested basis.  recommends bedside caregivers page  on duty if patient shows signs of acute spiritual or emotional distress.   Reiseñor 3   Board Certified [address] [place] [address]   [phone]

## 2022-05-16 NOTE — DISCHARGE INSTRUCTIONS
Patient Education        Colonoscopy: What to Expect at Home  Your Recovery  After a colonoscopy, you'll stay at the clinic until you wake up. Then you can go home. But you'll need to arrange for a ride. Your doctor will tell you when you can eat and do your other usual activities. Your doctor will talk to you about when you'll need your next colonoscopy. Your doctor can help you decide how often you need to be checked. This will depend on the results of your test and your risk for colorectal cancer. After the test, you may be bloated or have gas pains. You may need to pass gas. If a biopsy was done or a polyp was removed, you may have streaks of blood in your stool (feces) for a few days. Problems such as heavy rectal bleeding may not occur until several weeks after the test. This isn't common. But it can happen after polyps are removed. This care sheet gives you a general idea about how long it will take for you to recover. But each person recovers at a different pace. Follow the steps below to get better as quickly as possible. How can you care for yourself at home? Activity    · Rest when you feel tired.     · You can do your normal activities when it feels okay to do so. Diet    · Follow your doctor's directions for eating.     · Unless your doctor has told you not to, drink plenty of fluids. This helps to replace the fluids that were lost during the colon prep.     · Do not drink alcohol. Medicines    · Your doctor will tell you if and when you can restart your medicines. You will also be given instructions about taking any new medicines.     · If you take aspirin or some other blood thinner, ask your doctor if and when to start taking it again. Make sure that you understand exactly what your doctor wants you to do.     · If polyps were removed or a biopsy was done during the test, your doctor may tell you not to take aspirin or other anti-inflammatory medicines for a few days.  These include ibuprofen (Advil, Motrin) and naproxen (Aleve). Other instructions    · For your safety, do not drive or operate machinery until the medicine wears off and you can think clearly. Your doctor may tell you not to drive or operate machinery until the day after your test.     · Do not sign legal documents or make major decisions until the medicine wears off and you can think clearly. The anesthesia can make it hard for you to fully understand what you are agreeing to. Follow-up care is a key part of your treatment and safety. Be sure to make and go to all appointments, and call your doctor if you are having problems. It's also a good idea to know your test results and keep a list of the medicines you take. When should you call for help? Call 911 anytime you think you may need emergency care. For example, call if:    · You passed out (lost consciousness).     · You pass maroon or bloody stools.     · You have trouble breathing. Call your doctor now or seek immediate medical care if:    · You have pain that does not get better after you take pain medicine.     · You are sick to your stomach or cannot drink fluids.     · You have new or worse belly pain.     · You have blood in your stools.     · You have a fever.     · You cannot pass stools or gas. Watch closely for changes in your health, and be sure to contact your doctor if you have any problems. Where can you learn more? Go to http://www.gray.com/  Enter E264 in the search box to learn more about \"Colonoscopy: What to Expect at Home. \"  Current as of: September 8, 2021               Content Version: 13.2  © 3315-1788 [employer]. Care instructions adapted under license by [employer] (which disclaims liability or warranty for this information).  If you have questions about a medical condition or this instruction, always ask your healthcare professional. Amee Garrison disclaims any warranty or liability for your use of this information. Patient Education        Colon Polyps: Care Instructions  Your Care Instructions     Colon polyps are growths in the colon or the rectum. The cause of most colon polyps is not known, and most people who get them do not have any problems. But a certain kind can turn into cancer. For this reason, regular testing for colon polyps is important for people as they get older. It is also important for anyone who has an increased risk for colon cancer. Polyps are usually found through routine colon cancer screening tests. Although most colon polyps are not cancerous, they are usually removed and then tested for cancer. Screening for colon cancer saves lives because the cancer can usually be cured if it is caught early. If you have a polyp that is the type that can turn into cancer, you may need more tests to examine your entire colon. The doctor will remove any other polyps that he or she finds, and you will be tested more often. Follow-up care is a key part of your treatment and safety. Be sure to make and go to all appointments, and call your doctor if you are having problems. It's also a good idea to know your test results and keep a list of the medicines you take. How can you care for yourself at home? Regular exams to look for colon polyps are the best way to prevent polyps from turning into colon cancer. These can include stool tests, sigmoidoscopy, colonoscopy, and CT colonography. Talk with your doctor about a testing schedule that is right for you. To prevent polyps  There is no home treatment that can prevent colon polyps. But these steps may help lower your risk for cancer. · Stay active. Being active can help you get to and stay at a healthy weight. Try to exercise on most days of the week. Walking is a good choice. · Eat well. Choose a variety of vegetables, fruits, legumes (such as peas and beans), fish, poultry, and whole grains. · Do not smoke.  If you need help quitting, talk to your doctor about stop-smoking programs and medicines. These can increase your chances of quitting for good. · If you drink alcohol, limit how much you drink. Limit alcohol to 2 drinks a day for men and 1 drink a day for women. When should you call for help? Call your doctor now or seek immediate medical care if:    · You have severe belly pain.     · Your stools are maroon or very bloody. Watch closely for changes in your health, and be sure to contact your doctor if:    · You have a fever.     · You have nausea or vomiting.     · You have a change in bowel habits (new constipation or diarrhea).     · Your symptoms get worse or are not improving as expected. Where can you learn more? Go to http://www.aguillon.com/  Enter C571 in the search box to learn more about \"Colon Polyps: Care Instructions. \"  Current as of: September 8, 2021               Content Version: 13.2  © 2006-2022 [employer]. Care instructions adapted under license by [employer] (which disclaims liability or warranty for this information). If you have questions about a medical condition or this instruction, always ask your healthcare professional. [place] [address] any warranty or liability for your use of this information. Patient Education        Hemorrhoids: Care Instructions  Overview     Hemorrhoids are swollen veins that develop in the anal canal. Bleeding during bowel movements, itching, and rectal pain are the most common symptoms. Hemorrhoids can be uncomfortable at times, but rarely are they a serious problem. Most of the time, you can treat them with simple changes to your diet and bowel habits. These changes include eating more fiber and not straining to pass stools. Most hemorrhoids don't need surgery or other treatment unless they are very large and painful or bleed a lot. Follow-up care is a key part of your treatment and safety.  Be sure to make and go to all appointments, and call your doctor if you are having problems. It's also a good idea to know your test results and keep a list of the medicines you take. How can you care for yourself at home? · Sit in a few inches of warm water (sitz bath) 3 times a day and after bowel movements. The warm water helps with pain and itching. · Put ice on your anal area several times a day for 10 minutes at a time. Put a thin cloth between the ice and your skin. Follow this by placing a warm, wet towel on the area for another 10 to 20 minutes. · Take pain medicines exactly as directed. ? If the doctor gave you a prescription medicine for pain, take it as prescribed. ? If you are not taking a prescription pain medicine, ask your doctor if you can take an over-the-counter medicine. · Keep the anal area clean, but be gentle. Use water and a fragrance-free soap, or use baby wipes or medicated pads such as Tucks. · Wear cotton underwear and loose clothing to decrease moisture in the anal area. · Eat more fiber. Include foods such as whole-grain breads and cereals, raw vegetables, raw and dried fruits, and beans. · Drink plenty of fluids. If you have kidney, heart, or liver disease and have to limit fluids, talk with your doctor before you increase the amount of fluids you drink. · Use a stool softener that contains bran or psyllium. You can save money by buying bran or psyllium (available in bulk at most health food stores) and sprinkling it on foods or stirring it into fruit juice. Or you can use a product such as Metamucil or Hydrocil. · Practice healthy bowel habits. ? Go to the bathroom as soon as you have the urge. ? Avoid straining to pass stools. Relax and give yourself time to let things happen naturally. ? Do not hold your breath while passing stools. ? Do not read while sitting on the toilet. Get off the toilet as soon as you have finished. · Take your medicines exactly as prescribed. Call your doctor if you think you are having a problem with your medicine. When should you call for help? Call 911 anytime you think you may need emergency care. For example, call if:    · You pass maroon or very bloody stools. Call your doctor now or seek immediate medical care if:    · You have increased pain.     · You have increased bleeding. Watch closely for changes in your health, and be sure to contact your doctor if:    · Your symptoms have not improved after 3 or 4 days. Where can you learn more? Go to http://www.aguillon.com/  Enter F228 in the search box to learn more about \"Hemorrhoids: Care Instructions. \"  Current as of: September 8, 2021               Content Version: 13.2  © 2006-2022 [employer]. Care instructions adapted under license by [employer] (which disclaims liability or warranty for this information). If you have questions about a medical condition or this instruction, always ask your healthcare professional. [place] [address] any warranty or liability for your use of this information. DISCHARGE SUMMARY from Nurse    PATIENT INSTRUCTIONS:    After general anesthesia or intravenous sedation, for 24 hours or while taking prescription Narcotics:  · Limit your activities  · Do not drive and operate hazardous machinery  · Do not make important personal or business decisions  · Do  not drink alcoholic beverages  · If you have not urinated within 8 hours after discharge, please contact your surgeon on call.     Report the following to your surgeon:  · Excessive pain, swelling, redness or odor of or around the surgical area  · Temperature over 100.5  · Nausea and vomiting lasting longer than 4 hours or if unable to take medications  · Any signs of decreased circulation or nerve impairment to extremity: change in color, persistent  numbness, tingling, coldness or increase pain  · Any questions        These are general instructions for a healthy lifestyle:    No smoking/ No tobacco products/ Avoid exposure to second hand smoke  Surgeon General's Warning:  Quitting smoking now greatly reduces serious risk to your health. Obesity, smoking, and sedentary lifestyle greatly increases your risk for illness    A healthy diet, regular physical exercise & weight monitoring are important for maintaining a healthy lifestyle    You may be retaining fluid if you have a history of heart failure or if you experience any of the following symptoms:  Weight gain of 3 pounds or more overnight or 5 pounds in a week, increased swelling in our hands or feet or shortness of breath while lying flat in bed. Please call your doctor as soon as you notice any of these symptoms; do not wait until your next office visit. The discharge information has been reviewed with the patient. The patient verbalized understanding. Discharge medications reviewed with the patient and appropriate educational materials and side effects teaching were provided.   ___________________________________________________________________________________________________________________________________

## 2022-05-16 NOTE — PROCEDURES
[email or web address]  [phone]        Brief Procedure Note    Lisa Patel  1955  [id number]    Date of Procedure: 5/16/2022    Preoperative diagnosis:   Personal history of colon polyps:  V12.72 - Z86.010  Family history of colon cancer:  V16.0 - Z80.0    Postoperative diagnosis: cecal polyps x 2, sigmoid polyps x 2, hemorrhoids     Description of Findings: same    Sedation/Anesthesia: Monitored Anesthesia Care; See Anesthesia Note    Procedure: Procedure(s):  COLONOSCOPY, polypectomy    :  Dr. Gold Manzanares MD    Assistant(s): Endoscopy Technician-1: Jeo Wilson  Endoscopy RN-1: Suraj Barragan RN    EBL:None    Specimens:   ID Type Source Tests Collected by Time Destination   1 : cecal polyps  Preservative Cecum  Yumiko Flanagan MD 5/16/2022 1815 Pathology   2 : sigmoid polyps Preservative Sigmoid  Yumiko Flanagan MD 5/16/2022 7051 Pathology       Findings: See printed and scanned procedure note    Complications: None    Tissue Implant Device: None    Dr. Gold Manzanares MD  5/16/2022  8:04 AM    Gold Manzanares MD  [facility] [address] [phone]  [email or web address]

## 2022-05-16 NOTE — H&P
[email or web address]  [phone]      History and Physical    Patient: Leonie Looney MRN: [id number]  SSN: [id number]    YOB: 1955  Age: 73 y.o. Sex: male      Subjective:      Leonie Looney is a 73 y.o. male who presents for increased risk CRCS. Pt has a personal history of colon polyps. Denies symptoms or concerns. .     Past Medical History:   Diagnosis Date    Atrial fibrillation ([facility] 2020    Cirrhosis of liver ([facility]     Coronary artery disease     Coronary atherosclerosis of native coronary artery     Stable, no recent angina    ED (erectile dysfunction)     Elevated blood pressure reading without diagnosis of hypertension     Monitor     Esophageal varices in alcoholic cirrhosis ([facility]     History of hepatitis C     treated with interferon    Obstructive sleep apnea 2020    MELANI on CPAP     Other and unspecified hyperlipidemia     Paroxysmal atrial fibrillation ([facility] 2020    with pericardial effusion    Platelet inhibition due to Plavix     on Eliquis    Postsurgical percutaneous transluminal coronary angioplasty status 2019    Eight years since stent, no recent stent testing, EKG normal     Past Surgical History:   Procedure Laterality Date    HX CORONARY STENT PLACEMENT      HX HEENT      wisdom teeth    [place] CARDIAC SURG PROCEDURE [place]LIST  2019    pericardial window      Family History   Problem Relation Age of Onset    Heart Disease Father     Cancer Mother      Social History     Tobacco Use    Smoking status: Former Smoker     Quit date:      Years since quittin.3    Smokeless tobacco: Former User    Tobacco comment: Remotely quit tobacco use 2010   Substance Use Topics    Alcohol use: Yes     Alcohol/week: 14.0 standard drinks     Types: 14 Shots of liquor per week      Prior to Admission medications    Medication Sig Start Date End Date Taking?  Authorizing Provider   colchicine 0.6 mg tablet TAKE 1 TABLET BY MOUTH ONCE DAILY 3/22/22  Yes Brannon Boyer, NP   rosuvastatin (CRESTOR) 10 mg tablet TAKE 1 TABLET BY MOUTH EVERY EVENING 3/1/22  Yes Brannon Boyer, NP   MILK THISTLE PO Take  by mouth. daily   Yes Provider, Historical   losartan (COZAAR) 100 mg tablet Take 1 Tablet by mouth daily. 11/15/21  Yes Brannon Boyer NP   furosemide (LASIX) 20 mg tablet Take 1 Tablet by mouth daily. 9/24/21  Yes Brannon Boyer NP   co-enzyme Q-10 (Co Q-10) 100 mg capsule Take 100 mg by mouth daily. Yes Provider, Historical   tadalafiL (Cialis) 2.5 mg tablet Take 2.5 mg by mouth daily. Patient not taking: Reported on 5/12/2022    Provider, Historical        No Known Allergies    Review of Systems:  A comprehensive review of systems was negative except for that written in the History of Present Illness. Objective:     Vitals:    05/12/22 0901 05/16/22 0634   Weight: 122.5 kg (270 lb) 119.7 kg (264 lb)   Height:  5' 10\" (1.778 m)        Physical Exam:  GENERAL: alert, cooperative, no distress, appears stated age  LUNG: clear to auscultation bilaterally  HEART: regular rate and rhythm, S1, S2 normal, no murmur, click, rub or gallop  ABDOMEN: soft, non-tender. Bowel sounds normal. No masses,  no organomegaly  NEUROLOGIC: alert & oriented x 3    Assessment:     1. Personal history of colon polyps    Plan:     1. Colonoscopy    Signed By: Joanne Pacheco MD     May 16, 2022      Joanne Pacheco MD  [facility] [address]  cell - [phone]  [email or web address]

## 2022-05-16 NOTE — ANESTHESIA PREPROCEDURE EVALUATION
Relevant Problems   No relevant active problems       Anesthetic History   No history of anesthetic complications            Review of Systems / Medical History  Patient summary reviewed, nursing notes reviewed and pertinent labs reviewed    Pulmonary        Sleep apnea: CPAP           Neuro/Psych   Within defined limits           Cardiovascular    Hypertension: well controlled        Dysrhythmias : atrial fibrillation  CAD and cardiac stents    Exercise tolerance: >4 METS     GI/Hepatic/Renal           Liver disease    Comments: cirhhosis Endo/Other        Obesity     Other Findings   Comments: Hep C           Physical Exam    Airway  Mallampati: III  TM Distance: < 4 cm  Neck ROM: normal range of motion, short neck   Mouth opening: Normal     Cardiovascular  Regular rate and rhythm,  S1 and S2 normal,  no murmur, click, rub, or gallop  Rhythm: regular  Rate: normal         Dental    Dentition: Poor dentition  Comments: Mult broken or chipped teeth   Pulmonary  Breath sounds clear to auscultation               Abdominal  GI exam deferred       Other Findings            Anesthetic Plan    ASA: 3  Anesthesia type: MAC    Monitoring Plan: Continuous noninvasive hemodynamic monitoring      Induction: Intravenous  Anesthetic plan and risks discussed with: Patient and Spouse

## 2022-05-16 NOTE — ANESTHESIA POSTPROCEDURE EVALUATION
Procedure(s):  COLONOSCOPY, polypectomy. MAC    Anesthesia Post Evaluation      Multimodal analgesia: multimodal analgesia used between 6 hours prior to anesthesia start to PACU discharge  Patient location during evaluation: bedside  Patient participation: complete - patient participated  Level of consciousness: awake  Pain management: adequate  Airway patency: patent  Anesthetic complications: no  Cardiovascular status: hemodynamically stable, stable, blood pressure returned to baseline and acceptable  Respiratory status: acceptable, nonlabored ventilation and room air  Hydration status: acceptable  Post anesthesia nausea and vomiting:  none  Final Post Anesthesia Temperature Assessment:  Normothermia (36.0-37.5 degrees C)      INITIAL Post-op Vital signs: No vitals data found for the desired time range.

## 2022-06-16 ENCOUNTER — OFFICE VISIT (OUTPATIENT)
Dept: CARDIOLOGY CLINIC | Age: 67
End: 2022-06-16
Payer: MEDICARE

## 2022-06-16 VITALS
HEART RATE: 67 BPM | HEIGHT: 70 IN | DIASTOLIC BLOOD PRESSURE: 65 MMHG | BODY MASS INDEX: 38.08 KG/M2 | OXYGEN SATURATION: 98 % | SYSTOLIC BLOOD PRESSURE: 134 MMHG | WEIGHT: 266 LBS

## 2022-06-16 DIAGNOSIS — I25.10 CAD S/P PERCUTANEOUS CORONARY ANGIOPLASTY: ICD-10-CM

## 2022-06-16 DIAGNOSIS — E78.5 DYSLIPIDEMIA: ICD-10-CM

## 2022-06-16 DIAGNOSIS — I50.32 CHRONIC DIASTOLIC CONGESTIVE HEART FAILURE (HCC): ICD-10-CM

## 2022-06-16 DIAGNOSIS — I10 ESSENTIAL HYPERTENSION: ICD-10-CM

## 2022-06-16 DIAGNOSIS — I48.0 PAROXYSMAL ATRIAL FIBRILLATION (HCC): Primary | ICD-10-CM

## 2022-06-16 DIAGNOSIS — Z98.61 CAD S/P PERCUTANEOUS CORONARY ANGIOPLASTY: ICD-10-CM

## 2022-06-16 PROCEDURE — G8536 NO DOC ELDER MAL SCRN: HCPCS | Performed by: INTERNAL MEDICINE

## 2022-06-16 PROCEDURE — 1101F PT FALLS ASSESS-DOCD LE1/YR: CPT | Performed by: INTERNAL MEDICINE

## 2022-06-16 PROCEDURE — 3017F COLORECTAL CA SCREEN DOC REV: CPT | Performed by: INTERNAL MEDICINE

## 2022-06-16 PROCEDURE — G8417 CALC BMI ABV UP PARAM F/U: HCPCS | Performed by: INTERNAL MEDICINE

## 2022-06-16 PROCEDURE — G8754 DIAS BP LESS 90: HCPCS | Performed by: INTERNAL MEDICINE

## 2022-06-16 PROCEDURE — 1123F ACP DISCUSS/DSCN MKR DOCD: CPT | Performed by: INTERNAL MEDICINE

## 2022-06-16 PROCEDURE — G8752 SYS BP LESS 140: HCPCS | Performed by: INTERNAL MEDICINE

## 2022-06-16 PROCEDURE — 99214 OFFICE O/P EST MOD 30 MIN: CPT | Performed by: INTERNAL MEDICINE

## 2022-06-16 PROCEDURE — G8427 DOCREV CUR MEDS BY ELIG CLIN: HCPCS | Performed by: INTERNAL MEDICINE

## 2022-06-16 PROCEDURE — G8432 DEP SCR NOT DOC, RNG: HCPCS | Performed by: INTERNAL MEDICINE

## 2022-06-16 NOTE — PROGRESS NOTES
1. Have you been to the ER, urgent care clinic since your last visit? Hospitalized since your last visit? No    2. Have you seen or consulted any other health care providers outside of the [address] [place] [address] since your last visit? Include any pap smears or colon screening.  No

## 2022-06-16 NOTE — PROGRESS NOTES
HISTORY OF PRESENT ILLNESS  Ivette Nova is a 73 y.o. male. 1/2020. Patient seen in office today with increasing shortness of breath started about a week ago which is progressive associated with orthopnea PND. Denies any chest pain but he is short of breath on minimal activity. He has noted palpitation. 9/2021  Patient presents with complaints of shortness of breath and increased bilateral lower extremity edema. He reports this has been intermittent over the past few months. He also complains of occasional palpitations. He denies chest pain or orthopnea. He reports stopped taking metoprolol several months ago because he thought this was contributing to his depression. He is no longer taking Eliquis and is unsure why.    11/2021  Patient presents to f/u for shortness of breath and echocardiogram results. He reports SOB has improved since taking lasix he reports was recently seen by GI and has EGD scheduled in December. He denies chest pain, palpitations, or edema. He continues to drink approximately three alcoholic beverages daily during the week and six daily on weekends. He is no longer taking aspirin, reports stopped taking because he did not think he need any longer. 6/2022  Patient seen today for follow-up of his chronic conditions    Follow-up  The history is provided by the patient and medical records. Pertinent negatives include no chest pain, no abdominal pain, no headaches and no shortness of breath. Shortness of Breath  The history is provided by the patient. This is a new problem. The problem occurs frequently. The problem has been gradually worsening. Pertinent negatives include no fever, no headaches, no cough, no sputum production, no hemoptysis, no wheezing, no PND, no orthopnea, no chest pain, no vomiting, no abdominal pain, no rash, no leg swelling and no claudication. Precipitated by: exertion. He has tried nothing for the symptoms. Associated medical issues include CAD. Associated medical issues do not include COPD or chronic lung disease. Cholesterol Problem  The history is provided by the patient. This is a chronic problem. The problem occurs constantly. The problem has not changed since onset. Pertinent negatives include no chest pain, no abdominal pain, no headaches and no shortness of breath. Review of Systems   Constitutional: Negative for chills and fever. HENT: Negative for nosebleeds. Eyes: Negative for blurred vision and double vision. Respiratory: Negative for cough, hemoptysis, sputum production, shortness of breath and wheezing. Cardiovascular: Negative for chest pain, palpitations, orthopnea, claudication, leg swelling and PND. Gastrointestinal: Negative for abdominal pain, heartburn, nausea and vomiting. Musculoskeletal: Negative for myalgias. Skin: Negative for rash. Neurological: Negative for dizziness, weakness and headaches. Endo/Heme/Allergies: Does not bruise/bleed easily.      Family History   Problem Relation Age of Onset    Heart Disease Father     Cancer Mother        Past Medical History:   Diagnosis Date    Atrial fibrillation ([facility] [address] 01/07/2020    Cirrhosis of liver ([facility] [address]     Coronary artery disease     Coronary atherosclerosis of native coronary artery     Stable, no recent angina    ED (erectile dysfunction)     Elevated blood pressure reading without diagnosis of hypertension     Monitor     Esophageal varices in alcoholic cirrhosis ([facility] [address]     History of hepatitis C     treated with interferon    Obstructive sleep apnea 1/20/2020    MELANI on CPAP     Other and unspecified hyperlipidemia     Paroxysmal atrial fibrillation ([address] 1/8/2020    with pericardial effusion    Platelet inhibition due to Plavix     on Eliquis    Postsurgical percutaneous transluminal coronary angioplasty status 2019    Eight years since stent, no recent stent testing, EKG normal       Past Surgical History:   Procedure Laterality Date    COLONOSCOPY N/A 2022    COLONOSCOPY, polypectomy performed by Myah Tejeda MD at [address]  2004    HX HEENT      wisdom teeth    [place] CARDIAC SURG PROCEDURE UNLIST  2019    pericardial window     HX CORONARY STENT PLACEMENT           Social History     Tobacco Use    Smoking status: Former Smoker     Quit date:      Years since quittin.4    Smokeless tobacco: Former User    Tobacco comment: Remotely quit tobacco use 2010   Substance Use Topics    Alcohol use: Yes     Alcohol/week: 14.0 standard drinks     Types: 14 Shots of liquor per week       No Known Allergies    Prior to Admission medications    Medication Sig Start Date End Date Taking? Authorizing Provider   colchicine 0.6 mg tablet TAKE 1 TABLET BY MOUTH ONCE DAILY 3/22/22   Brannon Boyer NP   rosuvastatin (CRESTOR) 10 mg tablet TAKE 1 TABLET BY MOUTH EVERY EVENING 3/1/22   Brannon Boyer NP   MILK THISTLE PO Take  by mouth. daily    Provider, Historical   losartan (COZAAR) 100 mg tablet Take 1 Tablet by mouth daily. 11/15/21   Brannon Boyer NP   tadalafiL (Cialis) 2.5 mg tablet Take 2.5 mg by mouth daily. Patient not taking: Reported on 2022    Provider, Historical   furosemide (LASIX) 20 mg tablet Take 1 Tablet by mouth daily. 21   Brannon Boyer NP   co-enzyme Q-10 (Co Q-10) 100 mg capsule Take 100 mg by mouth daily. Provider, Historical         Visit Vitals  Ht 5' 10\" (1.778 m)   Wt 120.7 kg (266 lb)   BMI 38.17 kg/m²         Physical Exam  Vitals and nursing note reviewed. Constitutional:       Appearance: He is well-developed. HENT:      Head: Normocephalic and atraumatic. Eyes:      Conjunctiva/sclera: Conjunctivae normal.   Neck:      Thyroid: No thyromegaly. Vascular: No JVD. Trachea: No tracheal deviation. Cardiovascular:      Rate and Rhythm: Normal rate. Rhythm irregular. Heart sounds: Normal heart sounds. No murmur heard.   No friction rub. No gallop. Pulmonary:      Effort: No respiratory distress. Breath sounds: Normal breath sounds. No wheezing or rales. Chest:      Chest wall: No tenderness. Abdominal:      Palpations: Abdomen is soft. Tenderness: There is no abdominal tenderness. Musculoskeletal:      Cervical back: Neck supple. Right lower leg: No edema. Left lower leg: No edema. Skin:     General: Skin is warm and dry. Neurological:      Mental Status: He is alert and oriented to person, place, and time. Mr. Bren Jordan has a reminder for a \"due or due soon\" health maintenance. I have asked that he contact his primary care provider for follow-up on this health maintenance. SUMMARY:echo-2017  Left ventricle: Systolic function was normal. Ejection fraction was  estimated in the range of 55 % to 60 %. There were no regional wall motion  abnormalities. There was moderate concentric hypertrophy. COMPARISONS:  Comparison was made with the previous study of 10-Santhosh-2013. LV thickness  has increased. NUCLEAR IMAGIN2017     Findings:   1. Stress images reveal normal Myoview distrubution in all the LV segments in short axis, vertical and horizontal long axis views. 2. Resting images have a normal uptake. 3. Gated images reveal normal wall motion and the ejection fraction is calculated to be 74%. Conclusion:   1. Normal perfusion scan. 2. Normal wall motion and ejection fraction. 3. Low risk scan. Procedure Conclusion 2020    Nuclear Stress Test     Nuclear Cardiac Spect Rest then Gated Stress study. Lazara Bitters was used as the stressing method and agent. One day myocardial perfusion study. Date: 2020. Negative myocardial perfusion imaging. Myocardial perfusion imaging supports a low risk stress test.   There is a prior study available for comparison.  .     2020  Event monitor- frequent paroxysmal atrial fibrillation flutter      Interpretation Summary 2020    · LV: Normal cavity size and systolic function (ejection fraction normal). Mild concentric hypertrophy. Estimated left ventricular ejection fraction is 55 - 60%. Wall motion: normal. Moderate (grade 2) left ventricular diastolic dysfunction. · RV: Mildly dilated right ventricle. · RA: Mildly dilated right atrium. · MV: Mitral valve thickening. Mitral annular calcification. · Pericardium: Pericardial fat pad present. No evidence of pericardial effusion. Echo 10/2021  Interpretation Summary  · LV: Calculated LVEF is 64%. Biplane method used to measure ejection fraction. Normal cavity size and systolic function (ejection fraction normal). Increased wall thickness. Inconclusive left ventricular diastolic function. · LA: Mildly dilated left atrium. · RA: Mildly dilated right atrium. · MV: Mitral valve thickening. · TV: Mild tricuspid valve regurgitation is present. Right Ventricular Arterial Pressure (RVSP) is 25 mmHg. Pulmonary hypertension not suggested by Doppler findings. · PA: Pulmonary arterial systolic pressure is 25 mmHg. Assessment         ICD-10-CM ICD-9-CM    1. Paroxysmal atrial fibrillation (HCC)  I48.0 427.31     Stable continue treatment monitor   2. CAD S/P percutaneous coronary angioplasty  I25.10 414.01     Z98.61 V45.82     Stable continue current treatment   3. Dyslipidemia  E78.5 272.4     Continue treatment lab with PCP   4. Essential hypertension  I10 401.9     Stable continue current treatment   7/2020  Shortness of breath on exertion unclear etiology history of paroxysmal A. fib. Restarted Eliquis. Negative stress test normal ejection fraction enlarged right-sided chambers on echo. Evaluate with pulmonary for sleep apnea as well as shortness of breath.  9/2021  Patient presents with complaints of shortness of breath and bilateral lower extremity edema. He has history of pericardial effusion. He is unsure regarding many of his medications.   Of note he is no longer taking metoprolol or Eliquis and it is unclear why. Today's EKG SR with PVC. Will begin Lasix 20 mg/day and check BMP in 1 week. Recent BMP CBC reviewed and discussed with patient. Noted abnormal liver enzymes with elevated bilirubin. Also noted decreased platelets. Advised to follow-up with GI soon as possible. Will evaluate for repeat pericardial effusion with bedside echo. Will wait for follow-up CBC to evaluate resuming Eliquis if hemoglobin and platelet count are stable. 11/2021  SOB and edema has improved with lasix. Recent echocardiogram reviewed and discussed with patient, normal LF function, no pericardial effusion visualized. B/P elevated - will increased losartan to 100 mg / day - BMP in 1 week. Patient with h/o Hep C and cirrhosis - scheduled for EGD in December. H/o PAF - not a candidate for long term AC due to decreased platelets, and varices. Will refer for consideration for watchman device. Strongly encouraged to limit alcohol intake. Above plan discussed with patient - he verbalized understanding and is amenable to plan. 6/2022  Stable cardiac status. No recurrent A. fib. Happened many years ago. Continue monitoring. At present we will hold off on watchman as no recent episode of A. fib. There are no discontinued medications. No orders of the defined types were placed in this encounter.

## 2022-08-01 DIAGNOSIS — I30.9 ACUTE PERICARDITIS, UNSPECIFIED TYPE: ICD-10-CM

## 2022-08-01 RX ORDER — COLCHICINE 0.6 MG/1
TABLET ORAL
Qty: 60 TABLET | Refills: 0 | Status: SHIPPED | OUTPATIENT
Start: 2022-08-01

## 2022-09-14 DIAGNOSIS — R06.02 SOBOE (SHORTNESS OF BREATH ON EXERTION): ICD-10-CM

## 2022-09-14 RX ORDER — FUROSEMIDE 20 MG/1
TABLET ORAL
Qty: 90 TABLET | Refills: 1 | Status: SHIPPED | OUTPATIENT
Start: 2022-09-14 | End: 2022-10-25 | Stop reason: SDUPTHER

## 2022-09-19 DIAGNOSIS — I10 ESSENTIAL HYPERTENSION: ICD-10-CM

## 2022-09-19 RX ORDER — LOSARTAN POTASSIUM 100 MG/1
TABLET ORAL
Qty: 30 TABLET | Refills: 5 | Status: SHIPPED | OUTPATIENT
Start: 2022-09-19 | End: 2022-09-20 | Stop reason: SDUPTHER

## 2022-09-20 DIAGNOSIS — I10 ESSENTIAL HYPERTENSION: ICD-10-CM

## 2022-09-20 RX ORDER — LOSARTAN POTASSIUM 100 MG/1
100 TABLET ORAL DAILY
Qty: 30 TABLET | Refills: 5 | Status: SHIPPED | OUTPATIENT
Start: 2022-09-20

## 2022-09-20 NOTE — TELEPHONE ENCOUNTER
Requested Prescriptions     Pending Prescriptions Disp Refills    losartan (COZAAR) 100 mg tablet 30 Tablet 5     Sig: Take 1 Tablet by mouth daily.

## 2022-10-25 DIAGNOSIS — R06.02 SOBOE (SHORTNESS OF BREATH ON EXERTION): ICD-10-CM

## 2022-10-25 RX ORDER — FUROSEMIDE 20 MG/1
20 TABLET ORAL DAILY
Qty: 90 TABLET | Refills: 1 | Status: SHIPPED | OUTPATIENT
Start: 2022-10-25

## 2022-10-31 RX ORDER — ROSUVASTATIN CALCIUM 10 MG/1
TABLET, COATED ORAL
Qty: 30 TABLET | Refills: 4 | Status: SHIPPED | OUTPATIENT
Start: 2022-10-31 | End: 2022-11-16 | Stop reason: SDUPTHER

## 2022-11-12 DIAGNOSIS — I30.9 ACUTE PERICARDITIS, UNSPECIFIED TYPE: ICD-10-CM

## 2022-11-14 RX ORDER — COLCHICINE 0.6 MG/1
TABLET ORAL
Qty: 60 TABLET | Refills: 3 | Status: SHIPPED | OUTPATIENT
Start: 2022-11-14

## 2022-11-16 RX ORDER — ROSUVASTATIN CALCIUM 10 MG/1
10 TABLET, COATED ORAL EVERY EVENING
Qty: 30 TABLET | Refills: 4 | Status: SHIPPED | OUTPATIENT
Start: 2022-11-16

## 2022-12-16 ENCOUNTER — OFFICE VISIT (OUTPATIENT)
Dept: CARDIOLOGY CLINIC | Age: 67
End: 2022-12-16
Payer: MEDICARE

## 2022-12-16 VITALS
BODY MASS INDEX: 39.08 KG/M2 | DIASTOLIC BLOOD PRESSURE: 63 MMHG | HEART RATE: 78 BPM | OXYGEN SATURATION: 99 % | HEIGHT: 70 IN | SYSTOLIC BLOOD PRESSURE: 146 MMHG | WEIGHT: 273 LBS

## 2022-12-16 DIAGNOSIS — I10 ESSENTIAL HYPERTENSION: ICD-10-CM

## 2022-12-16 DIAGNOSIS — I25.10 CAD S/P PERCUTANEOUS CORONARY ANGIOPLASTY: ICD-10-CM

## 2022-12-16 DIAGNOSIS — I50.32 CHRONIC DIASTOLIC CONGESTIVE HEART FAILURE (HCC): ICD-10-CM

## 2022-12-16 DIAGNOSIS — I48.0 PAROXYSMAL ATRIAL FIBRILLATION (HCC): Primary | ICD-10-CM

## 2022-12-16 DIAGNOSIS — Z98.61 CAD S/P PERCUTANEOUS CORONARY ANGIOPLASTY: ICD-10-CM

## 2022-12-16 DIAGNOSIS — E78.5 DYSLIPIDEMIA: ICD-10-CM

## 2022-12-16 RX ORDER — OLMESARTAN MEDOXOMIL 40 MG/1
40 TABLET ORAL DAILY
Qty: 90 TABLET | Refills: 3 | Status: SHIPPED | OUTPATIENT
Start: 2022-12-16

## 2022-12-16 NOTE — PROGRESS NOTES
HISTORY OF PRESENT ILLNESS  Chris Spear is a 78 y.o. male. 1/2020. Patient seen in office today with increasing shortness of breath started about a week ago which is progressive associated with orthopnea PND. Denies any chest pain but he is short of breath on minimal activity. He has noted palpitation. 9/2021  Patient presents with complaints of shortness of breath and increased bilateral lower extremity edema. He reports this has been intermittent over the past few months. He also complains of occasional palpitations. He denies chest pain or orthopnea. He reports stopped taking metoprolol several months ago because he thought this was contributing to his depression. He is no longer taking Eliquis and is unsure why.    11/2021  Patient presents to f/u for shortness of breath and echocardiogram results. He reports SOB has improved since taking lasix he reports was recently seen by GI and has EGD scheduled in December. He denies chest pain, palpitations, or edema. He continues to drink approximately three alcoholic beverages daily during the week and six daily on weekends. He is no longer taking aspirin, reports stopped taking because he did not think he need any longer. 6/2022  Patient seen today for follow-up of his chronic conditions    Follow-up  The history is provided by the Patient and medical records. Pertinent negatives include no chest pain, no abdominal pain, no headaches and no shortness of breath. Shortness of Breath  The history is provided by the Patient. This is a new problem. The problem occurs frequently. The problem has been gradually worsening. Pertinent negatives include no fever, no headaches, no cough, no sputum production, no hemoptysis, no wheezing, no PND, no orthopnea, no chest pain, no vomiting, no abdominal pain, no rash, no leg swelling and no claudication. Precipitated by: exertion. He has tried nothing for the symptoms. Associated medical issues include CAD. Associated medical issues do not include COPD or chronic lung disease. Cholesterol Problem  The history is provided by the Patient. This is a chronic problem. The problem occurs constantly. The problem has not changed since onset. Pertinent negatives include no chest pain, no abdominal pain, no headaches and no shortness of breath. Review of Systems   Constitutional:  Negative for chills and fever. HENT:  Negative for nosebleeds. Eyes:  Negative for blurred vision and double vision. Respiratory:  Negative for cough, hemoptysis, sputum production, shortness of breath and wheezing. Cardiovascular:  Negative for chest pain, palpitations, orthopnea, claudication, leg swelling and PND. Gastrointestinal:  Negative for abdominal pain, heartburn, nausea and vomiting. Musculoskeletal:  Negative for myalgias. Skin:  Negative for rash. Neurological:  Negative for dizziness, weakness and headaches. Endo/Heme/Allergies:  Does not bruise/bleed easily.    Family History   Problem Relation Age of Onset    Heart Disease Father     Cancer Mother        Past Medical History:   Diagnosis Date    Atrial fibrillation ([facility] [address] 01/07/2020    Cirrhosis of liver ([facility] [address]     Coronary artery disease     Coronary atherosclerosis of native coronary artery     Stable, no recent angina    ED (erectile dysfunction)     Elevated blood pressure reading without diagnosis of hypertension     Monitor     Esophageal varices in alcoholic cirrhosis ([facility] [address]     History of hepatitis C     treated with interferon    Obstructive sleep apnea 1/20/2020    MELANI on CPAP     Other and unspecified hyperlipidemia     Paroxysmal atrial fibrillation ([address] 1/8/2020    with pericardial effusion    Platelet inhibition due to Plavix     on Eliquis    Postsurgical percutaneous transluminal coronary angioplasty status 2019    Eight years since stent, no recent stent testing, EKG normal       Past Surgical History:   Procedure Laterality Date    COLONOSCOPY N/A 2022    COLONOSCOPY, polypectomy performed by Ayan Barrios MD at [facility]  2004    HX HEENT      wisdom teeth    [place] CARDIAC SURG PROCEDURE UNLIST  2019    pericardial window     HX CORONARY STENT PLACEMENT           Social History     Tobacco Use    Smoking status: Former     Types: Cigarettes     Quit date:      Years since quittin.9    Smokeless tobacco: Former    Tobacco comments:     Remotely quit tobacco use 2010   Substance Use Topics    Alcohol use: Yes     Alcohol/week: 14.0 standard drinks     Types: 14 Shots of liquor per week       No Known Allergies    Prior to Admission medications    Medication Sig Start Date End Date Taking? Authorizing Provider   olmesartan (BENICAR) 40 mg tablet Take 1 Tablet by mouth daily. 22  Yes Bibi Alonzo MD   rosuvastatin (CRESTOR) 10 mg tablet Take 1 Tablet by mouth every evening. 22  Yes Brannon Boyer NP   furosemide (LASIX) 20 mg tablet Take 1 Tablet by mouth daily. 10/25/22  Yes Bibi Alonzo MD   MILK THISTLE PO Take  by mouth. daily   Yes Provider, Historical   co-enzyme Q-10 (Co Q-10) 100 mg capsule Take 100 mg by mouth daily. Yes Provider, Historical         Visit Vitals  BP (!) 146/63 (BP 1 Location: Left upper arm, BP Patient Position: Sitting, BP Cuff Size: Adult)   Pulse 78   Ht 5' 10\" (1.778 m)   Wt 123.8 kg (273 lb)   SpO2 99%   BMI 39.17 kg/m²         Physical Exam  Vitals and nursing note reviewed. Constitutional:       Appearance: He is well-developed. HENT:      Head: Normocephalic and atraumatic. Eyes:      Conjunctiva/sclera: Conjunctivae normal.   Neck:      Thyroid: No thyromegaly. Vascular: No JVD. Trachea: No tracheal deviation. Cardiovascular:      Rate and Rhythm: Normal rate and regular rhythm. Heart sounds: Normal heart sounds. No murmur heard. No friction rub. No gallop. Pulmonary:      Effort: No respiratory distress.       Breath sounds: Normal breath sounds. No wheezing or rales. Chest:      Chest wall: No tenderness. Abdominal:      Palpations: Abdomen is soft. Tenderness: There is no abdominal tenderness. Musculoskeletal:      Cervical back: Neck supple. Right lower leg: No edema. Left lower leg: No edema. Skin:     General: Skin is warm and dry. Neurological:      Mental Status: He is alert and oriented to person, place, and time. Mr. Robert Busby has a reminder for a \"due or due soon\" health maintenance. I have asked that he contact his primary care provider for follow-up on this health maintenance. SUMMARY:echo-2017  Left ventricle: Systolic function was normal. Ejection fraction was  estimated in the range of 55 % to 60 %. There were no regional wall motion  abnormalities. There was moderate concentric hypertrophy. COMPARISONS:  Comparison was made with the previous study of 10-Santhosh-2013. LV thickness  has increased. NUCLEAR IMAGIN2017     Findings:   1. Stress images reveal normal Myoview distrubution in all the LV segments in short axis, vertical and horizontal long axis views. 2. Resting images have a normal uptake. 3. Gated images reveal normal wall motion and the ejection fraction is calculated to be 74%. Conclusion:   1. Normal perfusion scan. 2. Normal wall motion and ejection fraction. 3. Low risk scan. Procedure Conclusion 2020    Nuclear Stress Test     Nuclear Cardiac Spect Rest then Gated Stress study. Verle Most was used as the stressing method and agent. One day myocardial perfusion study. Date: 2020. Negative myocardial perfusion imaging. Myocardial perfusion imaging supports a low risk stress test.   There is a prior study available for comparison. .     2020  Event monitor- frequent paroxysmal atrial fibrillation flutter      Interpretation Summary 2020    LV: Normal cavity size and systolic function (ejection fraction normal).  Mild concentric hypertrophy. Estimated left ventricular ejection fraction is 55 - 60%. Wall motion: normal. Moderate (grade 2) left ventricular diastolic dysfunction. RV: Mildly dilated right ventricle. RA: Mildly dilated right atrium. MV: Mitral valve thickening. Mitral annular calcification. Pericardium: Pericardial fat pad present. No evidence of pericardial effusion. Echo 10/2021  Interpretation Summary  LV: Calculated LVEF is 64%. Biplane method used to measure ejection fraction. Normal cavity size and systolic function (ejection fraction normal). Increased wall thickness. Inconclusive left ventricular diastolic function. LA: Mildly dilated left atrium. RA: Mildly dilated right atrium. MV: Mitral valve thickening. TV: Mild tricuspid valve regurgitation is present. Right Ventricular Arterial Pressure (RVSP) is 25 mmHg. Pulmonary hypertension not suggested by Doppler findings. PA: Pulmonary arterial systolic pressure is 25 mmHg. Assessment         ICD-10-CM ICD-9-CM    1. Paroxysmal atrial fibrillation (HCC)  I48.0 427.31 LIPID PANEL      CBC WITH AUTOMATED DIFF      METABOLIC PANEL, BASIC    Stable no recurrence unable to take anticoagulation due to low platelet      2. CAD S/P percutaneous coronary angioplasty  I25.10 414.01 LIPID PANEL    Z98.61 V45.82 CBC WITH AUTOMATED DIFF      METABOLIC PANEL, BASIC    Stable continue treatment      3. Essential hypertension  I10 401.9     Stable monitor      4. Dyslipidemia  E78.5 272.4     Continue treatment lab with PCP      5. Chronic diastolic congestive heart failure (HCC)  I50.32 428.32      428.0     Stable continue therapy      7/2020  Shortness of breath on exertion unclear etiology history of paroxysmal A. fib. Restarted Eliquis. Negative stress test normal ejection fraction enlarged right-sided chambers on echo.   Evaluate with pulmonary for sleep apnea as well as shortness of breath.  9/2021  Patient presents with complaints of shortness of breath and bilateral lower extremity edema. He has history of pericardial effusion. He is unsure regarding many of his medications. Of note he is no longer taking metoprolol or Eliquis and it is unclear why. Today's EKG SR with PVC. Will begin Lasix 20 mg/day and check BMP in 1 week. Recent BMP CBC reviewed and discussed with patient. Noted abnormal liver enzymes with elevated bilirubin. Also noted decreased platelets. Advised to follow-up with GI soon as possible. Will evaluate for repeat pericardial effusion with bedside echo. Will wait for follow-up CBC to evaluate resuming Eliquis if hemoglobin and platelet count are stable. 11/2021  SOB and edema has improved with lasix. Recent echocardiogram reviewed and discussed with patient, normal LF function, no pericardial effusion visualized. B/P elevated - will increased losartan to 100 mg / day - BMP in 1 week. Patient with h/o Hep C and cirrhosis - scheduled for EGD in December. H/o PAF - not a candidate for long term AC due to decreased platelets, and varices. Will refer for consideration for watchman device. Strongly encouraged to limit alcohol intake. Above plan discussed with patient - he verbalized understanding and is amenable to plan. 6/2022  Stable cardiac status. No recurrent A. fib. Happened many years ago. Continue monitoring. At present we will hold off on watchman as no recent episode of A. fib.  12/2022  Stable cardiac status. Blood pressure elevated. Discontinue losartan and change to olmesartan. Ordered lab. LFT followed by GI.   Continue statin and other treatment  Medications Discontinued During This Encounter   Medication Reason    losartan (COZAAR) 100 mg tablet Alternate Therapy    colchicine 0.6 mg tablet Therapy Completed       Orders Placed This Encounter    LIPID PANEL     Standing Status:   Future     Standing Expiration Date:   6/16/2023    CBC WITH AUTOMATED DIFF     Standing Status:   Future     Standing Expiration Date: 6/05/8810    METABOLIC PANEL, BASIC     Standing Status:   Future     Standing Expiration Date:   1/15/2023    olmesartan (BENICAR) 40 mg tablet     Sig: Take 1 Tablet by mouth daily. Dispense:  90 Tablet     Refill:  3       Follow-up and Dispositions    Return in about 6 months (around 6/16/2023).

## 2022-12-16 NOTE — PROGRESS NOTES
1. Have you been to the ER, urgent care clinic since your last visit? Hospitalized since your last visit? No    2. Have you seen or consulted any other health care providers outside of the [address] [place] [address] since your last visit? Include any pap smears or colon screening.  No

## 2022-12-20 ENCOUNTER — HOSPITAL ENCOUNTER (OUTPATIENT)
Dept: LAB | Age: 67
Discharge: HOME | End: 2022-12-20
Payer: MEDICARE

## 2022-12-20 ENCOUNTER — TELEPHONE (OUTPATIENT)
Dept: CARDIOLOGY CLINIC | Age: 67
End: 2022-12-20

## 2022-12-20 DIAGNOSIS — I25.10 CAD S/P PERCUTANEOUS CORONARY ANGIOPLASTY: ICD-10-CM

## 2022-12-20 DIAGNOSIS — Z98.61 CAD S/P PERCUTANEOUS CORONARY ANGIOPLASTY: ICD-10-CM

## 2022-12-20 DIAGNOSIS — I48.0 PAROXYSMAL ATRIAL FIBRILLATION (HCC): ICD-10-CM

## 2022-12-20 LAB
ANION GAP SERPL CALC-SCNC: 10 MMOL/L (ref 3–18)
BASOPHILS # BLD: 0 K/UL (ref 0–0.1)
BASOPHILS NFR BLD: 1 % (ref 0–2)
BUN SERPL-MCNC: 12 MG/DL (ref 7–18)
BUN/CREAT SERPL: 14 (ref 12–20)
CA-I BLD-MCNC: 8.3 MG/DL (ref 8.5–10.1)
CHLORIDE SERPL-SCNC: 105 MMOL/L (ref 100–111)
CHOLEST SERPL-MCNC: 159 MG/DL
CO2 SERPL-SCNC: 25 MMOL/L (ref 21–32)
CREAT SERPL-MCNC: 0.83 MG/DL (ref 0.6–1.3)
DIFFERENTIAL METHOD BLD: ABNORMAL
EOSINOPHIL # BLD: 0.1 K/UL (ref 0–0.4)
EOSINOPHIL NFR BLD: 3 % (ref 0–5)
ERYTHROCYTE [DISTWIDTH] IN BLOOD BY AUTOMATED COUNT: 13.3 % (ref 11.6–14.5)
GLUCOSE SERPL-MCNC: 152 MG/DL (ref 74–99)
HCT VFR BLD AUTO: 37.9 % (ref 36–48)
HDLC SERPL-MCNC: 53 MG/DL (ref 40–60)
HDLC SERPL: 3 {RATIO} (ref 0–5)
HGB BLD-MCNC: 13.6 G/DL (ref 13–16)
IMM GRANULOCYTES # BLD AUTO: 0 K/UL (ref 0–0.04)
IMM GRANULOCYTES NFR BLD AUTO: 0 % (ref 0–0.5)
LDLC SERPL CALC-MCNC: 73.2 MG/DL (ref 0–100)
LIPID PROFILE,FLP: ABNORMAL
LYMPHOCYTES # BLD: 1 K/UL (ref 0.9–3.6)
LYMPHOCYTES NFR BLD: 25 % (ref 21–52)
MCH RBC QN AUTO: 34.9 PG (ref 24–34)
MCHC RBC AUTO-ENTMCNC: 35.9 G/DL (ref 31–37)
MCV RBC AUTO: 97.2 FL (ref 78–100)
MONOCYTES # BLD: 0.5 K/UL (ref 0.05–1.2)
MONOCYTES NFR BLD: 12 % (ref 3–10)
NEUTS SEG # BLD: 2.2 K/UL (ref 1.8–8)
NEUTS SEG NFR BLD: 59 % (ref 40–73)
NRBC # BLD: 0 K/UL (ref 0–0.01)
NRBC BLD-RTO: 0 PER 100 WBC
PLATELET # BLD AUTO: 81 K/UL (ref 135–420)
PMV BLD AUTO: 11.1 FL (ref 9.2–11.8)
POTASSIUM SERPL-SCNC: 3.9 MMOL/L (ref 3.5–5.5)
RBC # BLD AUTO: 3.9 M/UL (ref 4.35–5.65)
RBC MORPH BLD: ABNORMAL
SODIUM SERPL-SCNC: 140 MMOL/L (ref 136–145)
TRIGL SERPL-MCNC: 164 MG/DL (ref ?–150)
VLDLC SERPL CALC-MCNC: 32.8 MG/DL
WBC # BLD AUTO: 3.8 K/UL (ref 4.6–13.2)

## 2022-12-20 PROCEDURE — 80061 LIPID PANEL: CPT

## 2022-12-20 PROCEDURE — 36415 COLL VENOUS BLD VENIPUNCTURE: CPT

## 2022-12-20 PROCEDURE — 85025 COMPLETE CBC W/AUTO DIFF WBC: CPT

## 2022-12-20 PROCEDURE — 80048 BASIC METABOLIC PNL TOTAL CA: CPT

## 2022-12-20 NOTE — TELEPHONE ENCOUNTER
----- Message from Klaus Ugalde MD sent at 12/20/2022 10:31 AM EST -----  Please call the patient regarding his abnormal result.   Glucose is elevated continue dietary modification

## 2022-12-20 NOTE — TELEPHONE ENCOUNTER
Spoke with patient regarding lab/test per Dr. Nina Landon. Lab reviewed. Please call the patient regarding his abnormal result. Glucose is elevated continue dietary modification. He voices understanding and acceptance of this advice and will call back if any further questions or concerns.

## 2022-12-21 ENCOUNTER — TELEPHONE (OUTPATIENT)
Dept: CARDIOLOGY CLINIC | Age: 67
End: 2022-12-21

## 2022-12-21 NOTE — TELEPHONE ENCOUNTER
Spoke to patient per Dr. Josefina Cheek regarding lab/test. Lab reviewed. Mild increase triglyceride. Continue with current treatment dietary modification with low-carb. He voices understanding and acceptance of this advice and will call back if any further questions or concerns.

## 2022-12-21 NOTE — TELEPHONE ENCOUNTER
----- Message from Naman Green MD sent at 12/21/2022  7:27 AM EST -----  Mild increase triglyceride.   Continue with current treatment dietary modification with low-carb

## 2023-04-21 ENCOUNTER — OFFICE VISIT (OUTPATIENT)
Age: 68
End: 2023-04-21

## 2023-04-21 VITALS
WEIGHT: 275 LBS | SYSTOLIC BLOOD PRESSURE: 121 MMHG | HEIGHT: 70 IN | OXYGEN SATURATION: 98 % | DIASTOLIC BLOOD PRESSURE: 68 MMHG | BODY MASS INDEX: 39.37 KG/M2 | HEART RATE: 62 BPM

## 2023-04-21 DIAGNOSIS — I50.32 CHRONIC DIASTOLIC (CONGESTIVE) HEART FAILURE (HCC): ICD-10-CM

## 2023-04-21 DIAGNOSIS — Z98.61 CORONARY ANGIOPLASTY STATUS: ICD-10-CM

## 2023-04-21 DIAGNOSIS — E66.01 SEVERE OBESITY (BMI 35.0-39.9) WITH COMORBIDITY (HCC): ICD-10-CM

## 2023-04-21 DIAGNOSIS — I48.0 PAROXYSMAL ATRIAL FIBRILLATION (HCC): Primary | ICD-10-CM

## 2023-04-21 DIAGNOSIS — E78.5 HYPERLIPIDEMIA, UNSPECIFIED HYPERLIPIDEMIA TYPE: ICD-10-CM

## 2023-04-21 DIAGNOSIS — I25.119 ATHEROSCLEROSIS OF NATIVE CORONARY ARTERY OF NATIVE HEART WITH ANGINA PECTORIS (HCC): ICD-10-CM

## 2023-04-21 RX ORDER — OLMESARTAN MEDOXOMIL 20 MG/1
20 TABLET ORAL DAILY
Qty: 90 TABLET | Refills: 3 | Status: SHIPPED | OUTPATIENT
Start: 2023-04-21

## 2023-04-21 RX ORDER — ALPRAZOLAM 0.5 MG/1
TABLET ORAL
COMMUNITY
Start: 2023-04-17

## 2023-04-21 ASSESSMENT — PATIENT HEALTH QUESTIONNAIRE - PHQ9
SUM OF ALL RESPONSES TO PHQ QUESTIONS 1-9: 0
1. LITTLE INTEREST OR PLEASURE IN DOING THINGS: 0
DEPRESSION UNABLE TO ASSESS: FUNCTIONAL CAPACITY MOTIVATION LIMITS ACCURACY
SUM OF ALL RESPONSES TO PHQ9 QUESTIONS 1 & 2: 0
SUM OF ALL RESPONSES TO PHQ QUESTIONS 1-9: 0
SUM OF ALL RESPONSES TO PHQ QUESTIONS 1-9: 0
2. FEELING DOWN, DEPRESSED OR HOPELESS: 0
SUM OF ALL RESPONSES TO PHQ QUESTIONS 1-9: 0

## 2023-04-21 NOTE — PROGRESS NOTES
1. Have you been to the ER, urgent care clinic since your last visit? Hospitalized since your last visit? No    2. Have you seen or consulted any other health care providers outside of the [address] [place] [address] since your last visit? Include any pap smears or colon screening.       No
motion: normal. Moderate (grade 2) left ventricular diastolic dysfunction. RV: Mildly dilated right ventricle. RA: Mildly dilated right atrium. MV: Mitral valve thickening. Mitral annular calcification. Pericardium: Pericardial fat pad present. No evidence of pericardial effusion. Echo 10/2021  Interpretation Summary  LV: Calculated LVEF is 64%. Biplane method used to measure ejection fraction. Normal cavity size and systolic function (ejection fraction normal). Increased wall thickness. Inconclusive left ventricular diastolic function. LA: Mildly dilated left atrium. RA: Mildly dilated right atrium. MV: Mitral valve thickening. TV: Mild tricuspid valve regurgitation is present. Right Ventricular Arterial Pressure (RVSP) is 25 mmHg. Pulmonary hypertension not suggested by Doppler findings. PA: Pulmonary arterial systolic pressure is 25 mmHg. No flowsheet data found. Assessment        Diagnosis Orders   1. Paroxysmal atrial fibrillation (HCC)      Stable continue treatment monitor      2. Chronic diastolic (congestive) heart failure (HCC)      Stable compensated continue treatment      3. Atherosclerosis of native coronary artery of native heart with angina pectoris ([address]      Stable continue treatment      4. Severe obesity (BMI 35.0-39. 9) with comorbidity (HCC)      Continue diet modification and exercise      5. Hyperlipidemia, unspecified hyperlipidemia type      Continue treatment lab with PCP      6. Coronary angioplasty status      Stable          There are no discontinued medications. Orders Placed This Encounter    ALPRAZolam (XANAX) 0.5 MG tablet             7/2020  Shortness of breath on exertion unclear etiology history of paroxysmal A. fib. Restarted Eliquis. Negative stress test normal ejection fraction enlarged right-sided chambers on echo.   Evaluate with pulmonary for sleep apnea as well as shortness of breath.  9/2021  Patient presents with complaints of shortness

## 2023-04-26 ENCOUNTER — TELEPHONE (OUTPATIENT)
Age: 68
End: 2023-04-26

## 2023-05-01 NOTE — TELEPHONE ENCOUNTER
Called patient he just received monitor in mail on this pass Saturday will have echo done 5/2 and when his 14 days is up will send monitor back. There is no strips to reads until patient has completed the 14 day. This patient had event monitor. Please show the strips to me. Await the results to decide if he needs cardioversion. He voices understanding and acceptance of this advice and will call back if any further questions or concerns.

## 2023-05-06 ENCOUNTER — APPOINTMENT (OUTPATIENT)
Facility: HOSPITAL | Age: 68
End: 2023-05-06
Payer: MEDICARE

## 2023-05-06 ENCOUNTER — HOSPITAL ENCOUNTER (EMERGENCY)
Facility: HOSPITAL | Age: 68
Discharge: HOME | End: 2023-05-06
Attending: EMERGENCY MEDICINE
Payer: MEDICARE

## 2023-05-06 VITALS
HEART RATE: 62 BPM | RESPIRATION RATE: 16 BRPM | BODY MASS INDEX: 38.65 KG/M2 | TEMPERATURE: 97.5 F | DIASTOLIC BLOOD PRESSURE: 76 MMHG | OXYGEN SATURATION: 98 % | HEIGHT: 70 IN | WEIGHT: 270 LBS | SYSTOLIC BLOOD PRESSURE: 147 MMHG

## 2023-05-06 DIAGNOSIS — I50.9 CONGESTIVE HEART FAILURE, UNSPECIFIED HF CHRONICITY, UNSPECIFIED HEART FAILURE TYPE (HCC): Primary | ICD-10-CM

## 2023-05-06 LAB
ALBUMIN SERPL-MCNC: 3.4 G/DL (ref 3.4–5)
ALBUMIN/GLOB SERPL: 1 (ref 0.8–1.7)
ALP SERPL-CCNC: 201 U/L (ref 45–117)
ALT SERPL-CCNC: 72 U/L (ref 16–61)
ANION GAP SERPL CALC-SCNC: 4 MMOL/L (ref 3–18)
AST SERPL-CCNC: 78 U/L (ref 10–38)
BASOPHILS # BLD: 0 K/UL (ref 0–0.1)
BASOPHILS NFR BLD: 0 % (ref 0–2)
BILIRUB SERPL-MCNC: 1.4 MG/DL (ref 0.2–1)
BUN SERPL-MCNC: 15 MG/DL (ref 7–18)
BUN/CREAT SERPL: 16 (ref 12–20)
CALCIUM SERPL-MCNC: 8.5 MG/DL (ref 8.5–10.1)
CHLORIDE SERPL-SCNC: 112 MMOL/L (ref 100–111)
CO2 SERPL-SCNC: 25 MMOL/L (ref 21–32)
CREAT SERPL-MCNC: 0.91 MG/DL (ref 0.6–1.3)
DIFFERENTIAL METHOD BLD: ABNORMAL
EKG ATRIAL RATE: 60 BPM
EKG DIAGNOSIS: NORMAL
EKG P AXIS: 48 DEGREES
EKG P-R INTERVAL: 188 MS
EKG Q-T INTERVAL: 444 MS
EKG QRS DURATION: 86 MS
EKG QTC CALCULATION (BAZETT): 444 MS
EKG R AXIS: 29 DEGREES
EKG T AXIS: 32 DEGREES
EKG VENTRICULAR RATE: 60 BPM
EOSINOPHIL # BLD: 0.1 K/UL (ref 0–0.4)
EOSINOPHIL NFR BLD: 3 % (ref 0–5)
ERYTHROCYTE [DISTWIDTH] IN BLOOD BY AUTOMATED COUNT: 13.2 % (ref 11.6–14.5)
GLOBULIN SER CALC-MCNC: 3.3 G/DL (ref 2–4)
GLUCOSE SERPL-MCNC: 86 MG/DL (ref 74–99)
HCT VFR BLD AUTO: 39.4 % (ref 36–48)
HGB BLD-MCNC: 14.3 G/DL (ref 13–16)
IMM GRANULOCYTES # BLD AUTO: 0 K/UL (ref 0–0.04)
IMM GRANULOCYTES NFR BLD AUTO: 0 % (ref 0–0.5)
LYMPHOCYTES # BLD: 1.2 K/UL (ref 0.9–3.6)
LYMPHOCYTES NFR BLD: 26 % (ref 21–52)
MCH RBC QN AUTO: 34.7 PG (ref 24–34)
MCHC RBC AUTO-ENTMCNC: 36.3 G/DL (ref 31–37)
MCV RBC AUTO: 95.6 FL (ref 78–100)
MONOCYTES # BLD: 0.6 K/UL (ref 0.05–1.2)
MONOCYTES NFR BLD: 13 % (ref 3–10)
NEUTS SEG # BLD: 2.8 K/UL (ref 1.8–8)
NEUTS SEG NFR BLD: 58 % (ref 40–73)
NRBC # BLD: 0 K/UL (ref 0–0.01)
NRBC BLD-RTO: 0 PER 100 WBC
NT PRO BNP: 198 PG/ML (ref 0–900)
PLATELET # BLD AUTO: 76 K/UL (ref 135–420)
PLATELET COMMENT: ABNORMAL
PMV BLD AUTO: 10.9 FL (ref 9.2–11.8)
POTASSIUM SERPL-SCNC: 4.4 MMOL/L (ref 3.5–5.5)
PROT SERPL-MCNC: 6.7 G/DL (ref 6.4–8.2)
RBC # BLD AUTO: 4.12 M/UL (ref 4.35–5.65)
RBC MORPH BLD: ABNORMAL
SODIUM SERPL-SCNC: 141 MMOL/L (ref 136–145)
TROPONIN I SERPL HS-MCNC: 14 NG/L (ref 0–78)
WBC # BLD AUTO: 4.7 K/UL (ref 4.6–13.2)

## 2023-05-06 PROCEDURE — 85025 COMPLETE CBC W/AUTO DIFF WBC: CPT

## 2023-05-06 PROCEDURE — 93005 ELECTROCARDIOGRAM TRACING: CPT | Performed by: EMERGENCY MEDICINE

## 2023-05-06 PROCEDURE — 83880 ASSAY OF NATRIURETIC PEPTIDE: CPT

## 2023-05-06 PROCEDURE — 93010 ELECTROCARDIOGRAM REPORT: CPT | Performed by: INTERNAL MEDICINE

## 2023-05-06 PROCEDURE — 84484 ASSAY OF TROPONIN QUANT: CPT

## 2023-05-06 PROCEDURE — 99285 EMERGENCY DEPT VISIT HI MDM: CPT

## 2023-05-06 PROCEDURE — 80053 COMPREHEN METABOLIC PANEL: CPT

## 2023-05-06 PROCEDURE — 71045 X-RAY EXAM CHEST 1 VIEW: CPT

## 2023-05-06 NOTE — ED TRIAGE NOTES
Pt to triage c/o shortness of breath with exertion and bilateral lower extremity swelling progressively worsening over the past couple of weeks. States he had an echocardiogram 4 days ago but does not know the results. Reports h/o of pericardial effusion and current symptoms feel similar to what he experienced at that time. He is currently wearing an ambulatory cardiac monitor.

## 2023-05-06 NOTE — ED PROVIDER NOTES
[facility] EMERGENCY DEPT  EMERGENCY DEPARTMENT ENCOUNTER    Patient Name: Kathy Lancaster  MRN: [id number]  YOB: 1955  Provider: Tan Goodman MD  PCP: EDMUNDO Dillard NP   Time/Date of evaluation: 1:38 PM EDT on 5/6/23    History of Presenting Illness     History Provided by: Patient  History is limited by: Nothing     HISTORY:   Kathy Lancaster is a 78 y.o. male presenting with shortness of breath. He does have a history of A-fib on Eliquis as well as chronic peripheral edema. He does have a previous history of a pericardial effusion of unknown etiology. This was about 3 years ago. He had a negative nuclear stress test after that. He has never been cardiac cath. He has never had a heart attack or stents placed. He presents today with worsening shortness of breath. He denies any chest pain. The shortness of breath is worse when he is exerting himself. He has not had any palpitations recently. His peripheral edema is worse. Review of the chart shows that he had an echo performed on 5/2 which showed mildly reduced ejection fraction at 45 to 50% as well as dyskinesis of the mid anterior septal and mid inferior lateral segments. There was no measurable pericardial effusion. This reduced ejection fraction appears to be a new finding. Nursing Notes were all reviewed and agreed with or any disagreements were addressed in the HPI.     Past History     PAST MEDICAL HISTORY:  Past Medical History:   Diagnosis Date    Atrial fibrillation ([facility] 01/07/2020    Cirrhosis of liver (HCC)     Coronary artery disease     Coronary atherosclerosis of native coronary artery     Stable, no recent angina    ED (erectile dysfunction)     Elevated blood pressure reading without diagnosis of hypertension     Monitor     Esophageal varices in alcoholic cirrhosis ([address]     History of hepatitis C     treated with interferon    Obstructive sleep apnea 1/20/2020    JYOTI on CPAP     Other and unspecified

## 2023-05-08 ENCOUNTER — TELEPHONE (OUTPATIENT)
Age: 68
End: 2023-05-08

## 2023-05-09 DIAGNOSIS — I50.32 CHRONIC DIASTOLIC (CONGESTIVE) HEART FAILURE (HCC): Primary | ICD-10-CM

## 2023-05-10 ENCOUNTER — OFFICE VISIT (OUTPATIENT)
Age: 68
End: 2023-05-10

## 2023-05-10 VITALS
HEART RATE: 61 BPM | WEIGHT: 273 LBS | BODY MASS INDEX: 39.08 KG/M2 | DIASTOLIC BLOOD PRESSURE: 60 MMHG | HEIGHT: 70 IN | SYSTOLIC BLOOD PRESSURE: 122 MMHG

## 2023-05-10 DIAGNOSIS — I48.0 PAROXYSMAL ATRIAL FIBRILLATION (HCC): ICD-10-CM

## 2023-05-10 DIAGNOSIS — I50.32 CHRONIC DIASTOLIC (CONGESTIVE) HEART FAILURE (HCC): ICD-10-CM

## 2023-05-10 DIAGNOSIS — D69.6 THROMBOCYTOPENIA (HCC): ICD-10-CM

## 2023-05-10 DIAGNOSIS — R06.02 SHORTNESS OF BREATH: ICD-10-CM

## 2023-05-10 DIAGNOSIS — I25.119 ATHEROSCLEROSIS OF NATIVE CORONARY ARTERY OF NATIVE HEART WITH ANGINA PECTORIS (HCC): Primary | ICD-10-CM

## 2023-05-10 DIAGNOSIS — R06.02 SHORTNESS OF BREATH: Primary | ICD-10-CM

## 2023-05-10 DIAGNOSIS — Z98.61 CORONARY ANGIOPLASTY STATUS: ICD-10-CM

## 2023-05-10 RX ORDER — SPIRONOLACTONE 50 MG/1
50 TABLET, FILM COATED ORAL DAILY
Qty: 90 TABLET | Refills: 1 | Status: SHIPPED | OUTPATIENT
Start: 2023-05-10

## 2023-05-10 NOTE — PROGRESS NOTES
HISTORY OF PRESENT ILLNESS  Sree Lassiter is a 78 y.o. male. 1/2020. Patient seen in office today with increasing shortness of breath started about a week ago which is progressive associated with orthopnea PND. Denies any chest pain but he is short of breath on minimal activity. He has noted palpitation. 9/2021  Patient presents with complaints of shortness of breath and increased bilateral lower extremity edema. He reports this has been intermittent over the past few months. He also complains of occasional palpitations. He denies chest pain or orthopnea. He reports stopped taking metoprolol several months ago because he thought this was contributing to his depression. He is no longer taking Eliquis and is unsure why.    11/2021  Patient presents to f/u for shortness of breath and echocardiogram results. He reports SOB has improved since taking lasix he reports was recently seen by GI and has EGD scheduled in December. He denies chest pain, palpitations, or edema. He continues to drink approximately three alcoholic beverages daily during the week and six daily on weekends. He is no longer taking aspirin, reports stopped taking because he did not think he need any longer. 6/2022  Patient seen today for follow-up of his chronic conditions    Follow-up  The history is provided by the Patient and medical records. Pertinent negatives include no chest pain, no abdominal pain, no headaches and no shortness of breath. Shortness of Breath  The history is provided by the Patient. This is a new problem. The problem occurs frequently. The problem has been gradually worsening. Pertinent negatives include no fever, no headaches, no cough, no sputum production, no hemoptysis, no wheezing, no PND, no orthopnea, no chest pain, no vomiting, no abdominal pain, no rash, no leg swelling and no claudication. Precipitated by: exertion. He has tried nothing for the symptoms. Associated medical issues include CAD.

## 2023-05-10 NOTE — PROGRESS NOTES
1. Have you been to the ER, urgent care clinic since your last visit? Hospitalized since your last visit? Yes Where: [facility]    2. Have you seen or consulted any other health care providers outside of the [address] [place] [address] since your last visit? Include any pap smears or colon screening. No     3. Do you need any refills today?    no

## 2023-05-11 ENCOUNTER — HOSPITAL ENCOUNTER (OUTPATIENT)
Age: 68
Discharge: HOME | End: 2023-05-11
Payer: MEDICARE

## 2023-05-11 DIAGNOSIS — R06.02 SHORTNESS OF BREATH: ICD-10-CM

## 2023-05-11 PROCEDURE — 71250 CT THORAX DX C-: CPT

## 2023-05-12 ENCOUNTER — TELEPHONE (OUTPATIENT)
Age: 68
End: 2023-05-12

## 2023-05-12 NOTE — TELEPHONE ENCOUNTER
----- Message from Jo You MD sent at 5/12/2023  2:55 PM EDT -----  Mild area of lung scarring which is unchanged.   No other abnormality noted in the lungs

## 2023-05-12 NOTE — TELEPHONE ENCOUNTER
Spoke with patient per Dr. Savita Kwong regarding lab/test per Dr. Abbey Mcdaniel. Lab reviewed. Mild area of lung scarring which is unchanged. No other abnormality noted in the lungs. He voices understanding and acceptance of this advice and will call back if any further questions or concerns.

## 2023-05-15 ENCOUNTER — TELEPHONE (OUTPATIENT)
Age: 68
End: 2023-05-15

## 2023-05-15 NOTE — TELEPHONE ENCOUNTER
Patient called today to ask if he could not go see a lung doctor d/t his test only showed mild scarring and his chest xrays came back normal. He is schedule for PFT on 5/16.  Please advise

## 2023-05-15 NOTE — TELEPHONE ENCOUNTER
Spoke with patient per Dr. Racquel Harp regarding Lung referral. Pradip Navarro to wait and monitor with PCP. He voices understanding and acceptance of this advice and will call back if any further questions or concerns.

## 2023-05-16 ENCOUNTER — PROCEDURE VISIT (OUTPATIENT)
Age: 68
End: 2023-05-16
Payer: MEDICARE

## 2023-05-16 VITALS — BODY MASS INDEX: 37.94 KG/M2 | HEIGHT: 71 IN | WEIGHT: 271 LBS

## 2023-05-16 DIAGNOSIS — R06.02 SHORTNESS OF BREATH: Primary | ICD-10-CM

## 2023-05-16 PROCEDURE — 94727 GAS DIL/WSHOT DETER LNG VOL: CPT | Performed by: INTERNAL MEDICINE

## 2023-05-16 PROCEDURE — 94060 EVALUATION OF WHEEZING: CPT | Performed by: INTERNAL MEDICINE

## 2023-05-16 PROCEDURE — 94729 DIFFUSING CAPACITY: CPT | Performed by: INTERNAL MEDICINE

## 2023-05-18 ENCOUNTER — HOSPITAL ENCOUNTER (OUTPATIENT)
Age: 68
Discharge: HOME | End: 2023-05-18
Payer: MEDICARE

## 2023-05-18 ENCOUNTER — TELEPHONE (OUTPATIENT)
Age: 68
End: 2023-05-18

## 2023-05-18 DIAGNOSIS — I25.119 ATHEROSCLEROSIS OF NATIVE CORONARY ARTERY OF NATIVE HEART WITH ANGINA PECTORIS (HCC): ICD-10-CM

## 2023-05-18 DIAGNOSIS — I50.32 CHRONIC DIASTOLIC (CONGESTIVE) HEART FAILURE (HCC): ICD-10-CM

## 2023-05-18 DIAGNOSIS — R06.02 SHORTNESS OF BREATH: ICD-10-CM

## 2023-05-18 LAB
ANION GAP SERPL CALC-SCNC: 8 MMOL/L (ref 3–18)
BUN SERPL-MCNC: 15 MG/DL (ref 7–18)
BUN/CREAT SERPL: 16 (ref 12–20)
CA-I BLD-MCNC: 8.6 MG/DL (ref 8.5–10.1)
CHLORIDE SERPL-SCNC: 112 MMOL/L (ref 100–111)
CO2 SERPL-SCNC: 24 MMOL/L (ref 21–32)
CREAT SERPL-MCNC: 0.91 MG/DL (ref 0.6–1.3)
GLUCOSE SERPL-MCNC: 96 MG/DL (ref 74–99)
POTASSIUM SERPL-SCNC: 4 MMOL/L (ref 3.5–5.5)
SODIUM SERPL-SCNC: 144 MMOL/L (ref 136–145)

## 2023-05-18 PROCEDURE — 80048 BASIC METABOLIC PNL TOTAL CA: CPT

## 2023-05-18 PROCEDURE — 36415 COLL VENOUS BLD VENIPUNCTURE: CPT

## 2023-05-18 NOTE — TELEPHONE ENCOUNTER
----- Message from Shayna Vergara MD sent at 5/18/2023 12:57 PM EDT -----  Lab/test  reviewed  No significant abnormality

## 2023-05-18 NOTE — TELEPHONE ENCOUNTER
Called and left message with patient regarding lab/test. Lab reviewed. No significant abnormality. If any questions to call office.

## 2023-06-01 ENCOUNTER — OFFICE VISIT (OUTPATIENT)
Age: 68
End: 2023-06-01
Payer: MEDICARE

## 2023-06-01 VITALS
OXYGEN SATURATION: 96 % | HEIGHT: 71 IN | BODY MASS INDEX: 38.22 KG/M2 | DIASTOLIC BLOOD PRESSURE: 64 MMHG | WEIGHT: 273 LBS | HEART RATE: 60 BPM | SYSTOLIC BLOOD PRESSURE: 116 MMHG

## 2023-06-01 DIAGNOSIS — I48.0 PAROXYSMAL ATRIAL FIBRILLATION (HCC): ICD-10-CM

## 2023-06-01 DIAGNOSIS — I25.119 ATHEROSCLEROSIS OF NATIVE CORONARY ARTERY OF NATIVE HEART WITH ANGINA PECTORIS (HCC): Primary | ICD-10-CM

## 2023-06-01 DIAGNOSIS — I50.32 CHRONIC DIASTOLIC (CONGESTIVE) HEART FAILURE (HCC): ICD-10-CM

## 2023-06-01 DIAGNOSIS — E78.5 HYPERLIPIDEMIA, UNSPECIFIED HYPERLIPIDEMIA TYPE: ICD-10-CM

## 2023-06-01 DIAGNOSIS — I10 ESSENTIAL (PRIMARY) HYPERTENSION: ICD-10-CM

## 2023-06-01 DIAGNOSIS — E66.01 SEVERE OBESITY (BMI 35.0-39.9) WITH COMORBIDITY (HCC): ICD-10-CM

## 2023-06-01 DIAGNOSIS — Z98.61 CORONARY ANGIOPLASTY STATUS: ICD-10-CM

## 2023-06-01 PROCEDURE — 3017F COLORECTAL CA SCREEN DOC REV: CPT | Performed by: NURSE PRACTITIONER

## 2023-06-01 PROCEDURE — G8417 CALC BMI ABV UP PARAM F/U: HCPCS | Performed by: NURSE PRACTITIONER

## 2023-06-01 PROCEDURE — 3078F DIAST BP <80 MM HG: CPT | Performed by: NURSE PRACTITIONER

## 2023-06-01 PROCEDURE — 1036F TOBACCO NON-USER: CPT | Performed by: NURSE PRACTITIONER

## 2023-06-01 PROCEDURE — 1123F ACP DISCUSS/DSCN MKR DOCD: CPT | Performed by: NURSE PRACTITIONER

## 2023-06-01 PROCEDURE — 99214 OFFICE O/P EST MOD 30 MIN: CPT | Performed by: NURSE PRACTITIONER

## 2023-06-01 PROCEDURE — G8427 DOCREV CUR MEDS BY ELIG CLIN: HCPCS | Performed by: NURSE PRACTITIONER

## 2023-06-01 PROCEDURE — 3074F SYST BP LT 130 MM HG: CPT | Performed by: NURSE PRACTITIONER

## 2023-06-01 RX ORDER — CARVEDILOL 6.25 MG/1
6.25 TABLET ORAL 2 TIMES DAILY
Qty: 60 TABLET | Refills: 3 | Status: SHIPPED | OUTPATIENT
Start: 2023-06-01

## 2023-06-01 ASSESSMENT — PATIENT HEALTH QUESTIONNAIRE - PHQ9
1. LITTLE INTEREST OR PLEASURE IN DOING THINGS: 0
SUM OF ALL RESPONSES TO PHQ QUESTIONS 1-9: 0
SUM OF ALL RESPONSES TO PHQ QUESTIONS 1-9: 0
SUM OF ALL RESPONSES TO PHQ9 QUESTIONS 1 & 2: 0
SUM OF ALL RESPONSES TO PHQ QUESTIONS 1-9: 0
SUM OF ALL RESPONSES TO PHQ QUESTIONS 1-9: 0
2. FEELING DOWN, DEPRESSED OR HOPELESS: 0

## 2023-06-01 NOTE — PROGRESS NOTES
1. Have you been to the ER, urgent care clinic since your last visit? Hospitalized since your last visit?     no    2. Have you seen or consulted any other health care providers outside of the [address] [place] [address] since your last visit? Include any pap smears or colon screening.       no
Platelet inhibition due to Plavix     on Eliquis    Postsurgical percutaneous transluminal coronary angioplasty status 2019    Eight years since stent, no recent stent testing, EKG normal       Past Surgical History:   Procedure Laterality Date    COLONOSCOPY N/A 2022    COLONOSCOPY, polypectomy performed by Augustin Long MD at [address] [place] [address]      HEENT      wisdom teeth    [place] UNLISTED PROCEDURE CARDIAC SURGERY  2019    pericardial window       Social History     Tobacco Use    Smoking status: Former     Packs/day: 1.00     Years: 25.00     Pack years: 25.00     Types: Cigarettes     Quit date: 2010     Years since quittin.4    Smokeless tobacco: Former    Tobacco comments:     Quit smoking: Remotely quit tobacco use    Substance Use Topics    Alcohol use: Yes     Alcohol/week: 14.0 standard drinks       No Known Allergies    Prior to Admission medications    Medication Sig Start Date End Date Taking? Authorizing Provider   carvedilol (COREG) 6.25 MG tablet Take 1 tablet by mouth 2 times daily 23  Yes EDMUNDO Altamirano NP   albuterol sulfate HFA (PROVENTIL;VENTOLIN;PROAIR) 108 (90 Base) MCG/ACT inhaler Inhale 1-2 puffs into the lungs every 4 hours as needed 17  Yes Historical Provider, MD   omeprazole (PRILOSEC) 10 MG delayed release capsule Take by mouth every morning (before breakfast)   Yes Historical Provider, MD   spironolactone (ALDACTONE) 50 MG tablet Take 1 tablet by mouth daily 5/10/23  Yes Keri Ferris MD   ALPRAZolam Kirke Ditto) 0.5 MG tablet daily as needed.  23  Yes Historical Provider, MD   olmesartan (BENICAR) 20 MG tablet Take 1 tablet by mouth daily 23  Yes Keri Ferris MD   MILK THISTLE PO Take by mouth   Yes Ar Automatic Reconciliation   coenzyme Q10 100 MG CAPS capsule Take 1 capsule by mouth daily   Yes Ar Automatic Reconciliation   furosemide (LASIX) 20 MG tablet Take 1 tablet by mouth daily 10/25/22

## 2023-06-01 NOTE — PATIENT INSTRUCTIONS
Stop taking Metoprolol  Begin taking Coreg 6.25 mg / 1 tab twice / day    Resume lasix    Continue all other medications. No

## 2023-06-02 DIAGNOSIS — I50.32 CHRONIC DIASTOLIC (CONGESTIVE) HEART FAILURE (HCC): Primary | ICD-10-CM

## 2023-06-02 RX ORDER — FUROSEMIDE 20 MG/1
20 TABLET ORAL DAILY
Qty: 90 TABLET | Refills: 3 | Status: SHIPPED | OUTPATIENT
Start: 2023-06-02

## 2023-07-13 ENCOUNTER — OFFICE VISIT (OUTPATIENT)
Age: 68
End: 2023-07-13

## 2023-07-13 VITALS
BODY MASS INDEX: 39.8 KG/M2 | OXYGEN SATURATION: 98 % | HEIGHT: 70 IN | SYSTOLIC BLOOD PRESSURE: 97 MMHG | DIASTOLIC BLOOD PRESSURE: 49 MMHG | HEART RATE: 56 BPM | WEIGHT: 278 LBS

## 2023-07-13 DIAGNOSIS — Z98.61 CORONARY ANGIOPLASTY STATUS: ICD-10-CM

## 2023-07-13 DIAGNOSIS — I48.0 PAROXYSMAL ATRIAL FIBRILLATION (HCC): ICD-10-CM

## 2023-07-13 DIAGNOSIS — I10 ESSENTIAL (PRIMARY) HYPERTENSION: ICD-10-CM

## 2023-07-13 DIAGNOSIS — I25.119 ATHEROSCLEROSIS OF NATIVE CORONARY ARTERY OF NATIVE HEART WITH ANGINA PECTORIS (HCC): Primary | ICD-10-CM

## 2023-07-13 DIAGNOSIS — I50.32 CHRONIC DIASTOLIC (CONGESTIVE) HEART FAILURE (HCC): ICD-10-CM

## 2023-07-13 RX ORDER — ROSUVASTATIN CALCIUM 10 MG/1
10 TABLET, COATED ORAL EVERY EVENING
Qty: 30 TABLET | Refills: 3 | Status: SHIPPED | OUTPATIENT
Start: 2023-07-13

## 2023-07-13 ASSESSMENT — PATIENT HEALTH QUESTIONNAIRE - PHQ9
SUM OF ALL RESPONSES TO PHQ QUESTIONS 1-9: 0
DEPRESSION UNABLE TO ASSESS: FUNCTIONAL CAPACITY MOTIVATION LIMITS ACCURACY
SUM OF ALL RESPONSES TO PHQ QUESTIONS 1-9: 0
SUM OF ALL RESPONSES TO PHQ9 QUESTIONS 1 & 2: 0
1. LITTLE INTEREST OR PLEASURE IN DOING THINGS: 0
SUM OF ALL RESPONSES TO PHQ QUESTIONS 1-9: 0
SUM OF ALL RESPONSES TO PHQ QUESTIONS 1-9: 0
2. FEELING DOWN, DEPRESSED OR HOPELESS: 0

## 2023-07-13 NOTE — PROGRESS NOTES
1. Have you been to the ER, urgent care clinic since your last visit? Hospitalized since your last visit? No    2. Have you seen or consulted any other health care providers outside of the [address] [place] [address] since your last visit? Include any pap smears or colon screening.       No
the following segments: mid anteroseptal and mid inferolateral.    Right Atrium: Right atrium is dilated. Right Ventricle: Right ventricle is dilated. Pulmonary Arteries: Pulmonary hypertension not present. The estimated PASP is 33 mmHg. Interpretation Summary 5/2023       Stress Combined Conclusion: Findings suggest a low risk of myocardial ischemia. Stress Function: Left ventricular function post-stress is normal. Post-stress ejection fraction is 59%. Perfusion Comments: LV perfusion is probably normal. There is no evidence of inducible ischemia. Perfusion Defect: There is a mild severity left ventricular stress perfusion defect that is small in size present in the basal inferior segment(s) that is fixed. Viability in the area is good. There is normal wall motion in the defect area. The defect appears to be an artifact caused by soft tissue. ECG: Resting ECG demonstrates sinus bradycardia. ECG: The ECG was negative for ischemia. Stress Test: A pharmacological stress test was performed using lexiscan. Hemodynamics are adequate for diagnosis. Blood pressure demonstrated a normal response and heart rate demonstrated a blunted response to stress. The patient's heart rate recovery was normal. The patient reported no symptoms during the stress test.       Chest CTA  IMPRESSION:  1. No evidence for interstitial lung disease. 2. Severe coronary artery calcification  3. There is smooth thickening of interlobular septa dependently in the lungs. 4. Changes consistent with cirrhosis This suggests increased extravascular  volume, pulmonary edema. Clinical correlation is suggested. No flowsheet data found. Assessment        Diagnosis Orders   1. Atherosclerosis of native coronary artery of native heart with angina pectoris ([address])      Stable continue treatment monitor      2.  Chronic diastolic (congestive) heart failure (HCC)      Stable compensated class II continue therapy monitor

## 2023-07-17 ENCOUNTER — TELEPHONE (OUTPATIENT)
Age: 68
End: 2023-07-17

## 2023-07-17 NOTE — TELEPHONE ENCOUNTER
Patient called and states he started taking Entresto and was not able to urinate after taking medication and then the next day he started getting a fever and still has one as of today.  Patient has stop taking Entresto and he isn't taking Benicar either and BP has been normal.
Spoke with patient per Dr. [place] Farfan regarding side effects. Respiratory initially is not side effect of Entresto. Would recommend holding Entresto as he is doing. Restart Benicar for his heart. He should follow-up with primary care doctor to check urinalysis and blood work to make sure he does not have any infection in the urine. He voices understanding and acceptance of this advice and will call back if any further questions or concerns.
No

## 2023-07-20 ENCOUNTER — TELEPHONE (OUTPATIENT)
Age: 68
End: 2023-07-20

## 2023-07-20 NOTE — TELEPHONE ENCOUNTER
Contacted Pt and provided instruction from physician:  Jose Luis Hatfield. Patient should go to emergency room so that he can have his blood work and evaluation done. Pt advised his wife jose transport him to [facility] ED.

## 2023-07-20 NOTE — TELEPHONE ENCOUNTER
Pt complaining of SOB, dizziness, increasing fatigue and low BP. Pt taking Benicar 40 mg daily since 07/13 visit* and would like to know what can be done to alleviate this worsening issue. Note: Benicar is not listed as a current medication. Vitals today: BP: 95/55, HR: 58. This RN advised Pt to report to ED if he felt the situation was urgent or symptoms worsened (BP/HR lower, SOB/dizziness/fatigue increase). Pt did follow up with PCP per prior direction, blood found in urine and Pt currently being treated for UTI.

## 2023-07-25 ENCOUNTER — TELEPHONE (OUTPATIENT)
Age: 68
End: 2023-07-25

## 2023-07-25 NOTE — TELEPHONE ENCOUNTER
Patient called and stated he was in hospital on Friday and wants you to review [facility] records. He states BP is low 22X systolic and 23'A diastolic. He states he's very tired and fatigue after sleeping for 10 hours.  Please advise

## 2023-07-26 NOTE — TELEPHONE ENCOUNTER
Spoke to patient per Dr. Angel Perry regarding ER follow up report. Reviewed his data I do not see any abnormality from cardiac standpoint that are significant. His LV systolic function has improved to normal.  He should follow-up with his GI doctor to make sure his cirrhosis is not getting worse. He voices understanding and acceptance of this advice and will call back if any further questions or concerns.

## 2023-08-03 ENCOUNTER — TELEPHONE (OUTPATIENT)
Age: 68
End: 2023-08-03

## 2023-08-03 RX ORDER — CARVEDILOL 3.12 MG/1
3.12 TABLET ORAL 2 TIMES DAILY
Qty: 60 TABLET | Refills: 3 | Status: SHIPPED | OUTPATIENT
Start: 2023-08-03

## 2023-08-03 NOTE — TELEPHONE ENCOUNTER
Patient states he is taking Entresto and BP/ HR has be really low BP today was in low 90/50's and HR was 36. He states he feel bad, dizzy, lightheaded and can't barely move out the chair. Patient not taking Entresto anymore until he advise from doctor.

## 2023-08-03 NOTE — TELEPHONE ENCOUNTER
Spoke to patient per Dr. Jonna Bellamy regarding BP and for dizziness. Okay to hold Entresto ~blood pressure is more than 100 and then retry that. Reduce spironolactone to 25 mg a day. Since the heart rate was so low, reduce carvedilol to 3.125 mg twice a day. Get home BP/HR chart  I am sending a prescription. He voices understanding and acceptance of this advice and will call back if any further questions or concerns.

## 2023-08-03 NOTE — TELEPHONE ENCOUNTER
Okay to hold Entresto ~blood pressure is more than 100 and then retry that. Reduce spironolactone to 25 mg a day. Since the heart rate was so low, reduce carvedilol to 3.125 mg twice a day.   Get home BP/HR chart  I am sending a prescription for

## 2023-09-05 ENCOUNTER — OFFICE VISIT (OUTPATIENT)
Age: 68
End: 2023-09-05
Payer: MEDICARE

## 2023-09-05 VITALS
DIASTOLIC BLOOD PRESSURE: 53 MMHG | WEIGHT: 277 LBS | BODY MASS INDEX: 39.65 KG/M2 | HEART RATE: 66 BPM | SYSTOLIC BLOOD PRESSURE: 102 MMHG | OXYGEN SATURATION: 96 % | HEIGHT: 70 IN

## 2023-09-05 DIAGNOSIS — E66.01 SEVERE OBESITY (BMI 35.0-39.9) WITH COMORBIDITY (HCC): ICD-10-CM

## 2023-09-05 DIAGNOSIS — I48.0 PAROXYSMAL ATRIAL FIBRILLATION (HCC): ICD-10-CM

## 2023-09-05 DIAGNOSIS — I25.119 ATHEROSCLEROSIS OF NATIVE CORONARY ARTERY OF NATIVE HEART WITH ANGINA PECTORIS (HCC): Primary | ICD-10-CM

## 2023-09-05 DIAGNOSIS — I10 ESSENTIAL (PRIMARY) HYPERTENSION: ICD-10-CM

## 2023-09-05 DIAGNOSIS — I50.32 CHRONIC DIASTOLIC (CONGESTIVE) HEART FAILURE (HCC): ICD-10-CM

## 2023-09-05 DIAGNOSIS — Z98.61 CORONARY ANGIOPLASTY STATUS: ICD-10-CM

## 2023-09-05 DIAGNOSIS — E78.5 HYPERLIPIDEMIA, UNSPECIFIED HYPERLIPIDEMIA TYPE: ICD-10-CM

## 2023-09-05 PROCEDURE — 99214 OFFICE O/P EST MOD 30 MIN: CPT | Performed by: NURSE PRACTITIONER

## 2023-09-05 PROCEDURE — G8417 CALC BMI ABV UP PARAM F/U: HCPCS | Performed by: NURSE PRACTITIONER

## 2023-09-05 PROCEDURE — 3078F DIAST BP <80 MM HG: CPT | Performed by: NURSE PRACTITIONER

## 2023-09-05 PROCEDURE — 3074F SYST BP LT 130 MM HG: CPT | Performed by: NURSE PRACTITIONER

## 2023-09-05 PROCEDURE — 3017F COLORECTAL CA SCREEN DOC REV: CPT | Performed by: NURSE PRACTITIONER

## 2023-09-05 PROCEDURE — G8427 DOCREV CUR MEDS BY ELIG CLIN: HCPCS | Performed by: NURSE PRACTITIONER

## 2023-09-05 PROCEDURE — 1123F ACP DISCUSS/DSCN MKR DOCD: CPT | Performed by: NURSE PRACTITIONER

## 2023-09-05 PROCEDURE — 1036F TOBACCO NON-USER: CPT | Performed by: NURSE PRACTITIONER

## 2023-09-05 ASSESSMENT — PATIENT HEALTH QUESTIONNAIRE - PHQ9
SUM OF ALL RESPONSES TO PHQ9 QUESTIONS 1 & 2: 0
SUM OF ALL RESPONSES TO PHQ QUESTIONS 1-9: 0
SUM OF ALL RESPONSES TO PHQ QUESTIONS 1-9: 0
1. LITTLE INTEREST OR PLEASURE IN DOING THINGS: 0
2. FEELING DOWN, DEPRESSED OR HOPELESS: 0
SUM OF ALL RESPONSES TO PHQ QUESTIONS 1-9: 0
SUM OF ALL RESPONSES TO PHQ QUESTIONS 1-9: 0

## 2023-09-05 NOTE — PROGRESS NOTES
HISTORY OF PRESENT ILLNESS  Geraldo Moses is a 78 y.o. male. 1/2020. Patient seen in office today with increasing shortness of breath started about a week ago which is progressive associated with orthopnea PND. Denies any chest pain but he is short of breath on minimal activity. He has noted palpitation. 9/2021  Patient presents with complaints of shortness of breath and increased bilateral lower extremity edema. He reports this has been intermittent over the past few months. He also complains of occasional palpitations. He denies chest pain or orthopnea. He reports stopped taking metoprolol several months ago because he thought this was contributing to his depression. He is no longer taking Eliquis and is unsure why.    11/2021  Patient presents to f/u for shortness of breath and echocardiogram results. He reports SOB has improved since taking lasix he reports was recently seen by GI and has EGD scheduled in December. He denies chest pain, palpitations, or edema. He continues to drink approximately three alcoholic beverages daily during the week and six daily on weekends. He is no longer taking aspirin, reports stopped taking because he did not think he need any longer. 6/2022  Patient seen today for follow-up of his chronic conditions  6/1/2023  Patient seen in follow up for testing results. He c/o episodes of dyspnea on exertion and BLE edema after consuming 3-4 beers. He stopped taking lasix due to confusion with medications. Has upcoming appt with Dr. Roberto Mcmillan hematology on 6/6/2023 9/2023  He did attempt to take [facility] however medications required adjustment due to hypotension. Since stopping Entresto and decrease dose of spironolactone patient denies shortness of breath or further episodes of hypotension. Follow-up  The history is provided by the Patient and medical records. Pertinent negatives include no chest pain, no abdominal pain, no headaches and no shortness of breath.

## 2023-09-05 NOTE — PROGRESS NOTES
1. Have you been to the ER, urgent care clinic since your last visit? Hospitalized since your last visit? No    2. Have you seen or consulted any other health care providers outside of the [address] [place] [address] since your last visit? Include any pap smears or colon screening.       Yes pcp

## 2023-11-08 RX ORDER — CARVEDILOL 6.25 MG/1
6.25 TABLET ORAL 2 TIMES DAILY
COMMUNITY
Start: 2023-09-16

## 2023-11-08 NOTE — PERIOP NOTE
Instructions for your procedure at [facility] [address] Date: 11/8/2023      Patient's Name: Darnell Babcock      Procedure Date: 11/10/2023        Please enter the main entrance of the hospital and check-in at the  located in the lobby. Do NOT eat or drink anything, including candy, gum, or ice chips after midnight prior to your procedure, unless it is part of your prep. Brush your teeth before coming to the hospital.You may swish with water, but do not swallow. No smoking/Vaping/E-Cigarettes 24 hours prior to the day of procedure. No alcohol 24 hours prior to the day of procedure. No recreational drugs for one week prior to the day of procedure. Bring Photo ID, Insurance information, and Co-pay if required on day of procedure. Bring in pertinent legal documents, such as, Medical Power of ARELY PAGAN, DNR, Advance Directive, etc.  Leave all other valuables, including money/purse, at home. Remove jewelry, including ALL body piercings, nail polish, acrylic nails, and makeup (including mascara); no lotions, powders, deodorant, and/or perfume/cologne/after shave on the skin. Glasses and dentures may be worn to the hospital.  They must be removed prior to procedure. Please bring case/container for glasses or dentures. 11. Contacts should not be worn on day of procedure. 12. Call the office ([phone]) if you have symptoms of a cold or illness within 24-48 hours prior to your procedure. 13. AN ADULT (relative or friend 18 years or older) MUST DRIVE YOU HOME AFTER YOUR PROCEDURE. 14. Please make arrangements for a responsible adult (18 years or older) to be with you for 24 hours after your procedure. 13. ONE VISITOR will be allowed in the waiting area during your procedure. Special Instructions:      Bring list of CURRENT medications.   Follow instructions from the office regarding Bowel Prep, Vitamins, Iron, Blood Thinners, Insulin, Seizure, and Blood

## 2023-11-09 ENCOUNTER — ANESTHESIA EVENT (OUTPATIENT)
Facility: HOSPITAL | Age: 68
End: 2023-11-09
Payer: MEDICARE

## 2023-11-10 ENCOUNTER — ANESTHESIA (OUTPATIENT)
Facility: HOSPITAL | Age: 68
End: 2023-11-10
Payer: MEDICARE

## 2023-11-10 ENCOUNTER — HOSPITAL ENCOUNTER (OUTPATIENT)
Facility: HOSPITAL | Age: 68
Setting detail: OUTPATIENT SURGERY
Discharge: HOME | End: 2023-11-10
Attending: INTERNAL MEDICINE | Admitting: INTERNAL MEDICINE
Payer: MEDICARE

## 2023-11-10 VITALS
DIASTOLIC BLOOD PRESSURE: 49 MMHG | TEMPERATURE: 97.8 F | SYSTOLIC BLOOD PRESSURE: 98 MMHG | RESPIRATION RATE: 16 BRPM | WEIGHT: 278 LBS | HEART RATE: 64 BPM | BODY MASS INDEX: 39.8 KG/M2 | OXYGEN SATURATION: 99 % | HEIGHT: 70 IN

## 2023-11-10 PROCEDURE — 2709999900 HC NON-CHARGEABLE SUPPLY: Performed by: INTERNAL MEDICINE

## 2023-11-10 PROCEDURE — 6360000002 HC RX W HCPCS: Performed by: NURSE ANESTHETIST, CERTIFIED REGISTERED

## 2023-11-10 PROCEDURE — 7100000010 HC PHASE II RECOVERY - FIRST 15 MIN: Performed by: INTERNAL MEDICINE

## 2023-11-10 PROCEDURE — 2500000003 HC RX 250 WO HCPCS: Performed by: NURSE ANESTHETIST, CERTIFIED REGISTERED

## 2023-11-10 PROCEDURE — 88305 TISSUE EXAM BY PATHOLOGIST: CPT

## 2023-11-10 PROCEDURE — 3700000000 HC ANESTHESIA ATTENDED CARE: Performed by: INTERNAL MEDICINE

## 2023-11-10 PROCEDURE — 7100000000 HC PACU RECOVERY - FIRST 15 MIN: Performed by: INTERNAL MEDICINE

## 2023-11-10 PROCEDURE — 2580000003 HC RX 258: Performed by: NURSE ANESTHETIST, CERTIFIED REGISTERED

## 2023-11-10 PROCEDURE — 3600007502: Performed by: INTERNAL MEDICINE

## 2023-11-10 RX ORDER — PROPOFOL 10 MG/ML
INJECTION, EMULSION INTRAVENOUS PRN
Status: DISCONTINUED | OUTPATIENT
Start: 2023-11-10 | End: 2023-11-10 | Stop reason: SDUPTHER

## 2023-11-10 RX ORDER — SODIUM CHLORIDE, SODIUM LACTATE, POTASSIUM CHLORIDE, CALCIUM CHLORIDE 600; 310; 30; 20 MG/100ML; MG/100ML; MG/100ML; MG/100ML
INJECTION, SOLUTION INTRAVENOUS CONTINUOUS
Status: DISCONTINUED | OUTPATIENT
Start: 2023-11-10 | End: 2023-11-10 | Stop reason: HOSPADM

## 2023-11-10 RX ORDER — LIDOCAINE HYDROCHLORIDE 10 MG/ML
1 INJECTION, SOLUTION EPIDURAL; INFILTRATION; INTRACAUDAL; PERINEURAL
Status: DISCONTINUED | OUTPATIENT
Start: 2023-11-10 | End: 2023-11-10 | Stop reason: HOSPADM

## 2023-11-10 RX ORDER — LIDOCAINE HYDROCHLORIDE 20 MG/ML
INJECTION, SOLUTION EPIDURAL; INFILTRATION; INTRACAUDAL; PERINEURAL PRN
Status: DISCONTINUED | OUTPATIENT
Start: 2023-11-10 | End: 2023-11-10 | Stop reason: SDUPTHER

## 2023-11-10 RX ADMIN — PROPOFOL 50 MG: 10 INJECTION, EMULSION INTRAVENOUS at 10:17

## 2023-11-10 RX ADMIN — LIDOCAINE HYDROCHLORIDE 100 MG: 20 INJECTION, SOLUTION EPIDURAL; INFILTRATION; INTRACAUDAL; PERINEURAL at 10:16

## 2023-11-10 RX ADMIN — SODIUM CHLORIDE, POTASSIUM CHLORIDE, SODIUM LACTATE AND CALCIUM CHLORIDE: 600; 310; 30; 20 INJECTION, SOLUTION INTRAVENOUS at 09:18

## 2023-11-10 RX ADMIN — PROPOFOL 100 MG: 10 INJECTION, EMULSION INTRAVENOUS at 10:16

## 2023-11-10 ASSESSMENT — PAIN SCALES - GENERAL
PAINLEVEL_OUTOF10: 0

## 2023-11-10 ASSESSMENT — PAIN - FUNCTIONAL ASSESSMENT: PAIN_FUNCTIONAL_ASSESSMENT: 0-10

## 2023-11-10 NOTE — ANESTHESIA POSTPROCEDURE EVALUATION
Department of Anesthesiology  Postprocedure Note    Patient: Lore Sherman  MRN: [id number]  YOB: 1955  Date of evaluation: 11/10/2023      Procedure Summary     Date: 11/10/23 Room / Location: [facility] ENDO 02 / [facility] ENDOSCOPY    Anesthesia Start: 12 Anesthesia Stop: 0101    Procedure: EGD ESOPHAGOGASTRODUODENOSCOPY (Upper GI Region) Diagnosis:       Lara's esophagus without dysplasia      Hepatic cirrhosis, unspecified hepatic cirrhosis type, unspecified whether ascites present ([address])      Gastritis without bleeding, unspecified chronicity, unspecified gastritis type      Gastroesophageal reflux disease with esophagitis, unspecified whether hemorrhage      Esophageal varices without bleeding, unspecified esophageal varices type ([address])      (Lara's esophagus without dysplasia [K22.70])      (Hepatic cirrhosis, unspecified hepatic cirrhosis type, unspecified whether ascites present ([address]) [K74.60])      (Gastritis without bleeding, unspecified chronicity, unspecified gastritis type [K29.70])      (Gastroesophageal reflux disease with esophagitis, unspecified whether hemorrhage [K21.00])      (Esophageal varices without bleeding, unspecified esophageal varices type ([address]) [I85.00])    Surgeons: Jung Stockton MD Responsible Provider: Hugo Presley MD    Anesthesia Type: MAC ASA Status: 3          Anesthesia Type: MAC    Casey Phase I: Casey Score: 10    Casey Phase II: Casey Score: 10      Anesthesia Post Evaluation    Patient location during evaluation: PACU  Patient participation: complete - patient participated  Level of consciousness: sleepy but conscious  Pain score: 0  Airway patency: patent  Nausea & Vomiting: no nausea and no vomiting  Complications: no  Cardiovascular status: blood pressure returned to baseline  Respiratory status: acceptable  Hydration status: euvolemic  Pain management: adequate

## 2023-11-10 NOTE — PERIOP NOTE
Unable  to  obtain digital signature for discharge instructions as signature pad associated with [employer] is not operational.

## 2023-12-11 RX ORDER — CARVEDILOL 6.25 MG/1
6.25 TABLET ORAL 2 TIMES DAILY
Qty: 60 TABLET | Refills: 3 | OUTPATIENT
Start: 2023-12-11

## 2023-12-11 RX ORDER — SPIRONOLACTONE 50 MG/1
50 TABLET, FILM COATED ORAL DAILY
Qty: 90 TABLET | Refills: 1 | OUTPATIENT
Start: 2023-12-11

## 2023-12-12 DIAGNOSIS — I50.32 CHRONIC DIASTOLIC (CONGESTIVE) HEART FAILURE (HCC): Primary | ICD-10-CM

## 2023-12-12 DIAGNOSIS — I10 ESSENTIAL (PRIMARY) HYPERTENSION: ICD-10-CM

## 2023-12-12 RX ORDER — SPIRONOLACTONE 50 MG/1
50 TABLET, FILM COATED ORAL DAILY
Qty: 90 TABLET | Refills: 3 | Status: SHIPPED | OUTPATIENT
Start: 2023-12-12

## 2023-12-12 RX ORDER — CARVEDILOL 6.25 MG/1
6.25 TABLET ORAL 2 TIMES DAILY
Qty: 60 TABLET | Refills: 3 | Status: SHIPPED | OUTPATIENT
Start: 2023-12-12

## 2023-12-12 NOTE — TELEPHONE ENCOUNTER
Requested Prescriptions     Pending Prescriptions Disp Refills    carvedilol (COREG) 6.25 MG tablet 60 tablet 3     Sig: Take 1 tablet by mouth 2 times daily    spironolactone (ALDACTONE) 50 MG tablet 90 tablet 3     Sig: Take 1 tablet by mouth daily

## 2024-01-03 ENCOUNTER — TELEPHONE (OUTPATIENT)
Age: 69
End: 2024-01-03

## 2024-01-03 RX ORDER — PANTOPRAZOLE SODIUM 40 MG/10ML
40 INJECTION, POWDER, LYOPHILIZED, FOR SOLUTION INTRAVENOUS DAILY
COMMUNITY
Start: 2023-12-11

## 2024-01-03 NOTE — TELEPHONE ENCOUNTER
Pt experienced BLE edema beginning Saturday-swelling is resolving with elevation of extremities. Pt complains of dizziness upon waking in the evening and early morning for the past two days. Medication review completed. Pt advised that he takes carvedilol and Crestor in the evening. Pt will take orthostatic BP today and upload to [employer]. Pt will begin BP log today as well.     Please advise if any medication or activity changes needed.

## 2024-01-05 NOTE — TELEPHONE ENCOUNTER
This RN contacted the patient at telephone number listed on file. Name and  verified with patient as identifiers.      If edema is improving will continue to monitor and follow home BP    Pt advised edema has improved and dizziness subsided.

## 2024-01-11 ENCOUNTER — OFFICE VISIT (OUTPATIENT)
Age: 69
End: 2024-01-11
Payer: MEDICARE

## 2024-01-11 VITALS
SYSTOLIC BLOOD PRESSURE: 116 MMHG | OXYGEN SATURATION: 98 % | HEART RATE: 61 BPM | WEIGHT: 282 LBS | HEIGHT: 70 IN | DIASTOLIC BLOOD PRESSURE: 55 MMHG | BODY MASS INDEX: 40.37 KG/M2

## 2024-01-11 DIAGNOSIS — I50.32 CHRONIC DIASTOLIC (CONGESTIVE) HEART FAILURE (HCC): ICD-10-CM

## 2024-01-11 DIAGNOSIS — I50.32 CHRONIC DIASTOLIC (CONGESTIVE) HEART FAILURE (HCC): Primary | ICD-10-CM

## 2024-01-11 DIAGNOSIS — E66.01 SEVERE OBESITY (BMI 35.0-39.9) WITH COMORBIDITY (HCC): ICD-10-CM

## 2024-01-11 DIAGNOSIS — E78.2 MIXED HYPERLIPIDEMIA: ICD-10-CM

## 2024-01-11 DIAGNOSIS — I10 ESSENTIAL (PRIMARY) HYPERTENSION: ICD-10-CM

## 2024-01-11 DIAGNOSIS — Z98.61 CORONARY ANGIOPLASTY STATUS: ICD-10-CM

## 2024-01-11 DIAGNOSIS — I48.0 PAROXYSMAL ATRIAL FIBRILLATION (HCC): ICD-10-CM

## 2024-01-11 DIAGNOSIS — I25.119 ATHEROSCLEROSIS OF NATIVE CORONARY ARTERY OF NATIVE HEART WITH ANGINA PECTORIS (HCC): ICD-10-CM

## 2024-01-11 PROCEDURE — 1123F ACP DISCUSS/DSCN MKR DOCD: CPT | Performed by: NURSE PRACTITIONER

## 2024-01-11 PROCEDURE — 1036F TOBACCO NON-USER: CPT | Performed by: NURSE PRACTITIONER

## 2024-01-11 PROCEDURE — G8417 CALC BMI ABV UP PARAM F/U: HCPCS | Performed by: NURSE PRACTITIONER

## 2024-01-11 PROCEDURE — 99214 OFFICE O/P EST MOD 30 MIN: CPT | Performed by: NURSE PRACTITIONER

## 2024-01-11 PROCEDURE — G8484 FLU IMMUNIZE NO ADMIN: HCPCS | Performed by: NURSE PRACTITIONER

## 2024-01-11 PROCEDURE — G8427 DOCREV CUR MEDS BY ELIG CLIN: HCPCS | Performed by: NURSE PRACTITIONER

## 2024-01-11 PROCEDURE — 3074F SYST BP LT 130 MM HG: CPT | Performed by: NURSE PRACTITIONER

## 2024-01-11 PROCEDURE — 3078F DIAST BP <80 MM HG: CPT | Performed by: NURSE PRACTITIONER

## 2024-01-11 PROCEDURE — 3017F COLORECTAL CA SCREEN DOC REV: CPT | Performed by: NURSE PRACTITIONER

## 2024-01-11 RX ORDER — SPIRONOLACTONE 50 MG/1
25 TABLET, FILM COATED ORAL DAILY
Qty: 90 TABLET | Refills: 3
Start: 2024-01-11

## 2024-01-11 RX ORDER — PANTOPRAZOLE SODIUM 40 MG/1
40 TABLET, DELAYED RELEASE ORAL DAILY
COMMUNITY

## 2024-01-11 ASSESSMENT — PATIENT HEALTH QUESTIONNAIRE - PHQ9
SUM OF ALL RESPONSES TO PHQ QUESTIONS 1-9: 0
1. LITTLE INTEREST OR PLEASURE IN DOING THINGS: 0
SUM OF ALL RESPONSES TO PHQ QUESTIONS 1-9: 0

## 2024-01-11 NOTE — PROGRESS NOTES
1. Have you been to the ER, urgent care clinic since your last visit?  Hospitalized since your last visit?     no    2. Have you seen or consulted any other health care providers outside of the [facility] since your last visit?  Include any pap smears or colon screening.      no   
interstitial lung disease.  2. Severe coronary artery calcification  3. There is smooth thickening of interlobular septa dependently in the lungs.  4. Changes consistent with cirrhosis This suggests increased extravascular  volume, pulmonary edema. Clinical correlation is suggested.          No data to display                  Assessment     Mr. Cook has a reminder for a \"due or due soon\" health maintenance. I have asked that he contact his primary care provider for follow-up on this health maintenance.         No data to display                  Assessment        Diagnosis Orders   1. Chronic diastolic (congestive) heart failure (HCC)  spironolactone (ALDACTONE) 50 MG tablet    Stable compensated class II continue therapy monitor      2. Paroxysmal atrial fibrillation (HCC)      Stable symptom.  Continue therapy      3. Atherosclerosis of native coronary artery of native heart with angina pectoris (HCC)      Stable continue treatment monitor      4. Coronary angioplasty status      Stable monitor      5. Essential (primary) hypertension      B/p Controlled - continue current medications      6. Severe obesity (BMI 35.0-39.9) with comorbidity (HCC)      Weight loss efforts encouraged      7. Mixed hyperlipidemia      Continue treatment, lab with PCP      8. Chronic diastolic (congestive) heart failure (HCC)  spironolactone (ALDACTONE) 50 MG tablet            Medications Discontinued During This Encounter   Medication Reason    pantoprazole (PROTONIX) 40 MG injection ERROR    sacubitril-valsartan (ENTRESTO) 24-26 MG per tablet Side effects    spironolactone (ALDACTONE) 50 MG tablet REORDER       7/2020  Shortness of breath on exertion unclear etiology history of paroxysmal A. fib.  Restarted Eliquis.  Negative stress test normal ejection fraction enlarged right-sided chambers on echo.  Evaluate with pulmonary for sleep apnea as well as shortness of breath.  9/2021  Patient presents with complaints of shortness of

## 2024-01-11 NOTE — PATIENT INSTRUCTIONS
Decrease spironolactone to 25 mg (1/2 tab) daily    Monitor symptoms.  After the recommended changes have been made in blood pressure medicines, patient advised to keep BP/HR(pulse rate) chart twice daily and bring us results in next 4 to 5 days. Patient may send the results via \"My Chart\" if desired.  Please rest for 5-10 minutes before checking blood pressure.  Sit on a comfortable chair without crossing the legs and put your arm on a table.  We recommend that you use an upper arm cuff.  Check the blood pressure 3 times each time you check the blood pressure and record the lowest reading.  If you check the blood pressure in both arms, use the higher reading.

## 2024-01-23 DIAGNOSIS — I10 ESSENTIAL (PRIMARY) HYPERTENSION: ICD-10-CM

## 2024-01-23 RX ORDER — CARVEDILOL 6.25 MG/1
6.25 TABLET ORAL 2 TIMES DAILY
Qty: 60 TABLET | Refills: 3 | Status: SHIPPED | OUTPATIENT
Start: 2024-01-23

## 2024-01-23 NOTE — TELEPHONE ENCOUNTER
Requested Prescriptions     Pending Prescriptions Disp Refills    carvedilol (COREG) 6.25 MG tablet 60 tablet 3     Sig: Take 1 tablet by mouth 2 times daily

## 2024-02-23 ENCOUNTER — OFFICE VISIT (OUTPATIENT)
Age: 69
End: 2024-02-23
Payer: MEDICARE

## 2024-02-23 VITALS
SYSTOLIC BLOOD PRESSURE: 114 MMHG | DIASTOLIC BLOOD PRESSURE: 52 MMHG | OXYGEN SATURATION: 98 % | BODY MASS INDEX: 40.23 KG/M2 | HEART RATE: 52 BPM | HEIGHT: 70 IN | WEIGHT: 281 LBS

## 2024-02-23 DIAGNOSIS — E66.01 SEVERE OBESITY (BMI 35.0-39.9) WITH COMORBIDITY (HCC): ICD-10-CM

## 2024-02-23 DIAGNOSIS — I10 ESSENTIAL (PRIMARY) HYPERTENSION: ICD-10-CM

## 2024-02-23 DIAGNOSIS — I48.0 PAROXYSMAL ATRIAL FIBRILLATION (HCC): ICD-10-CM

## 2024-02-23 DIAGNOSIS — E78.2 MIXED HYPERLIPIDEMIA: ICD-10-CM

## 2024-02-23 DIAGNOSIS — Z98.61 CORONARY ANGIOPLASTY STATUS: ICD-10-CM

## 2024-02-23 DIAGNOSIS — I25.119 ATHEROSCLEROSIS OF NATIVE CORONARY ARTERY OF NATIVE HEART WITH ANGINA PECTORIS (HCC): ICD-10-CM

## 2024-02-23 DIAGNOSIS — I50.32 CHRONIC DIASTOLIC (CONGESTIVE) HEART FAILURE (HCC): Primary | ICD-10-CM

## 2024-02-23 PROCEDURE — 3078F DIAST BP <80 MM HG: CPT | Performed by: NURSE PRACTITIONER

## 2024-02-23 PROCEDURE — 3017F COLORECTAL CA SCREEN DOC REV: CPT | Performed by: NURSE PRACTITIONER

## 2024-02-23 PROCEDURE — G8484 FLU IMMUNIZE NO ADMIN: HCPCS | Performed by: NURSE PRACTITIONER

## 2024-02-23 PROCEDURE — G8417 CALC BMI ABV UP PARAM F/U: HCPCS | Performed by: NURSE PRACTITIONER

## 2024-02-23 PROCEDURE — G8427 DOCREV CUR MEDS BY ELIG CLIN: HCPCS | Performed by: NURSE PRACTITIONER

## 2024-02-23 PROCEDURE — 1036F TOBACCO NON-USER: CPT | Performed by: NURSE PRACTITIONER

## 2024-02-23 PROCEDURE — 1123F ACP DISCUSS/DSCN MKR DOCD: CPT | Performed by: NURSE PRACTITIONER

## 2024-02-23 PROCEDURE — 99214 OFFICE O/P EST MOD 30 MIN: CPT | Performed by: NURSE PRACTITIONER

## 2024-02-23 PROCEDURE — 3074F SYST BP LT 130 MM HG: CPT | Performed by: NURSE PRACTITIONER

## 2024-02-23 NOTE — PROGRESS NOTES
1. Have you been to the ER, urgent care clinic since your last visit?  Hospitalized since your last visit?     no    2. Have you seen or consulted any other health care providers outside of the [facility] since your last visit?  Include any pap smears or colon screening.      no

## 2024-02-23 NOTE — PROGRESS NOTES
HISTORY OF PRESENT ILLNESS  Conrado Cook is a 67 y.o. male.    1/2020.  Patient seen in office today with increasing shortness of breath started about a week ago which is progressive associated with orthopnea PND.  Denies any chest pain but he is short of breath on minimal activity.  He has noted palpitation.    9/2021  Patient presents with complaints of shortness of breath and increased bilateral lower extremity edema.  He reports this has been intermittent over the past few months.  He also complains of occasional palpitations.  He denies chest pain or orthopnea.  He reports stopped taking metoprolol several months ago because he thought this was contributing to his depression.  He is no longer taking Eliquis and is unsure why.    11/2021  Patient presents to f/u for shortness of breath and echocardiogram results. He reports SOB has improved since taking lasix he reports was recently seen by GI and has EGD scheduled in December. He denies chest pain, palpitations, or edema. He continues to drink approximately three alcoholic beverages daily during the week and six daily on weekends. He is no longer taking aspirin, reports stopped taking because he did not think he need any longer.  6/2022  Patient seen today for follow-up of his chronic conditions  6/1/2023  Patient seen in follow up for testing results.  He c/o episodes of dyspnea on exertion and BLE edema after consuming 3-4 beers.  He stopped taking lasix due to confusion with medications.  Has upcoming appt with Dr. Coronel hematology on 6/6/2023 9/2023  He did attempt to take Entresto however medications required adjustment due to hypotension.  Since stopping Entresto and decrease dose of spironolactone patient denies shortness of breath or further episodes of hypotension.  1/2024  Patient seen in follow-up for dizziness.  He reports symptoms have improved and blood pressure is stable.  Patient complains of breast tenderness.  He denies chest pain shortness

## 2024-05-07 DIAGNOSIS — I50.32 CHRONIC DIASTOLIC (CONGESTIVE) HEART FAILURE (HCC): Primary | ICD-10-CM

## 2024-05-07 RX ORDER — SPIRONOLACTONE 25 MG/1
25 TABLET ORAL DAILY
Qty: 90 TABLET | Refills: 3 | Status: SHIPPED | OUTPATIENT
Start: 2024-05-07

## 2024-05-07 RX ORDER — SPIRONOLACTONE 25 MG/1
25 TABLET ORAL DAILY
COMMUNITY
End: 2024-05-07 | Stop reason: SDUPTHER

## 2024-05-07 NOTE — TELEPHONE ENCOUNTER
Requested Prescriptions     Pending Prescriptions Disp Refills    spironolactone (ALDACTONE) 25 MG tablet 90 tablet 3     Sig: Take 1 tablet by mouth daily

## 2024-05-08 DIAGNOSIS — I10 ESSENTIAL (PRIMARY) HYPERTENSION: ICD-10-CM

## 2024-05-08 RX ORDER — CARVEDILOL 6.25 MG/1
6.25 TABLET ORAL 2 TIMES DAILY
Qty: 60 TABLET | Refills: 3 | Status: SHIPPED | OUTPATIENT
Start: 2024-05-08 | End: 2024-05-10

## 2024-05-08 NOTE — TELEPHONE ENCOUNTER
Patient need.....    Carvedilol 6.25  Spironolacton 25mg    30 day     [facility]

## 2024-05-10 DIAGNOSIS — I10 ESSENTIAL (PRIMARY) HYPERTENSION: ICD-10-CM

## 2024-05-10 RX ORDER — CARVEDILOL 6.25 MG/1
6.25 TABLET ORAL 2 TIMES DAILY
Qty: 60 TABLET | Refills: 3 | Status: SHIPPED | OUTPATIENT
Start: 2024-05-10

## 2024-05-24 ENCOUNTER — HOSPITAL ENCOUNTER (EMERGENCY)
Age: 69
Discharge: LEFT BEFORE DISCHARGE | End: 2024-05-24

## 2024-05-25 ENCOUNTER — HOSPITAL ENCOUNTER (EMERGENCY)
Age: 69
Discharge: HOME | End: 2024-05-25
Attending: FAMILY MEDICINE
Payer: MEDICARE

## 2024-05-25 VITALS
WEIGHT: 270 LBS | TEMPERATURE: 97.7 F | RESPIRATION RATE: 18 BRPM | SYSTOLIC BLOOD PRESSURE: 142 MMHG | HEART RATE: 70 BPM | BODY MASS INDEX: 38.65 KG/M2 | OXYGEN SATURATION: 99 % | HEIGHT: 70 IN | DIASTOLIC BLOOD PRESSURE: 74 MMHG

## 2024-05-25 DIAGNOSIS — L08.9 FOOT INFECTION: Primary | ICD-10-CM

## 2024-05-25 PROCEDURE — 10060 I&D ABSCESS SIMPLE/SINGLE: CPT

## 2024-05-25 PROCEDURE — 6370000000 HC RX 637 (ALT 250 FOR IP): Performed by: FAMILY MEDICINE

## 2024-05-25 PROCEDURE — 99283 EMERGENCY DEPT VISIT LOW MDM: CPT

## 2024-05-25 RX ORDER — SULFAMETHOXAZOLE AND TRIMETHOPRIM 800; 160 MG/1; MG/1
1 TABLET ORAL 2 TIMES DAILY
Qty: 20 TABLET | Refills: 0 | Status: SHIPPED | OUTPATIENT
Start: 2024-05-25 | End: 2024-06-04

## 2024-05-25 RX ORDER — CEPHALEXIN 250 MG/1
500 CAPSULE ORAL
Status: COMPLETED | OUTPATIENT
Start: 2024-05-25 | End: 2024-05-25

## 2024-05-25 RX ORDER — CEPHALEXIN 500 MG/1
500 CAPSULE ORAL 3 TIMES DAILY
Qty: 21 CAPSULE | Refills: 0 | Status: SHIPPED | OUTPATIENT
Start: 2024-05-25 | End: 2024-06-01

## 2024-05-25 RX ORDER — GINSENG 100 MG
CAPSULE ORAL
Status: COMPLETED | OUTPATIENT
Start: 2024-05-25 | End: 2024-05-25

## 2024-05-25 RX ORDER — SULFAMETHOXAZOLE AND TRIMETHOPRIM 800; 160 MG/1; MG/1
1 TABLET ORAL ONCE
Status: COMPLETED | OUTPATIENT
Start: 2024-05-25 | End: 2024-05-25

## 2024-05-25 RX ADMIN — SULFAMETHOXAZOLE AND TRIMETHOPRIM 1 TABLET: 800; 160 TABLET ORAL at 10:28

## 2024-05-25 RX ADMIN — BACITRACIN 1 EACH: 500 OINTMENT TOPICAL at 10:28

## 2024-05-25 RX ADMIN — CEPHALEXIN 500 MG: 250 CAPSULE ORAL at 10:28

## 2024-05-25 ASSESSMENT — PAIN SCALES - GENERAL: PAINLEVEL_OUTOF10: 9

## 2024-05-25 ASSESSMENT — PAIN - FUNCTIONAL ASSESSMENT: PAIN_FUNCTIONAL_ASSESSMENT: 0-10

## 2024-05-25 ASSESSMENT — PAIN DESCRIPTION - LOCATION: LOCATION: FOOT

## 2024-05-25 ASSESSMENT — PAIN DESCRIPTION - ORIENTATION: ORIENTATION: LEFT

## 2024-05-25 NOTE — ED PROVIDER NOTES
[facility] EMERGENCY DEPT  EMERGENCY DEPARTMENT ENCOUNTER      Pt Name: Conrado Cook  MRN: [id number]  Birthdate 1955  Date of evaluation: 5/25/2024  Provider: Davey Duarte DO  10:32 AM    CHIEF COMPLAINT       Chief Complaint   Patient presents with    Foreign Body in Skin         HISTORY OF PRESENT ILLNESS    Conrado Cook is a 68 y.o. male who presents to the emergency department patient comes in stating that several days ago he thinks he stepped on something in his garage.  Was unsure what it was.  Noticed some pus coming out of his foot his wife removed what look like fishing line from his left foot.  He states it look like plastic.  He admits he does not like to wear shoes since he is retired.  Denies any fevers.  Becoming more unbearable for walking.  Has not take anything for the pain    HPI    Nursing Notes were reviewed.    REVIEW OF SYSTEMS       Review of Systems   Constitutional:  Negative for fever.   Skin:  Positive for wound.   All other systems reviewed and are negative.      Except as noted above the remainder of the review of systems was reviewed and negative.       PAST MEDICAL HISTORY     Past Medical History:   Diagnosis Date    Atrial fibrillation (HCC) 01/07/2020    Cirrhosis of liver (HCC)     Coronary artery disease     Coronary atherosclerosis of native coronary artery     Stable, no recent angina    ED (erectile dysfunction)     Elevated blood pressure reading without diagnosis of hypertension     Monitor     Esophageal varices in alcoholic cirrhosis (HCC)     History of hepatitis C     treated with interferon    Obstructive sleep apnea 1/20/2020    JYOTI on CPAP     Other and unspecified hyperlipidemia     Paroxysmal atrial fibrillation (HCC) 1/8/2020    with pericardial effusion    Platelet inhibition due to Plavix     on Eliquis-discontinued    Postsurgical percutaneous transluminal coronary angioplasty status 2019    Eight years since stent, no recent stent testing, EKG normal  orders to display         ED BEDSIDE ULTRASOUND:   Performed by ED Physician - none    LABS:  Labs Reviewed - No data to display    All other labs were within normal range or not returned as of this dictation.    EMERGENCY DEPARTMENT COURSE and DIFFERENTIAL DIAGNOSIS/MDM:   Vitals:    Vitals:    05/25/24 1014   BP: (!) 142/74   Pulse: 70   Resp: 18   Temp: 97.7 °F (36.5 °C)   TempSrc: Oral   SpO2: 99%   Weight: 122.5 kg (270 lb)   Height: 1.778 m (5' 10\")           Medical Decision Making  Differential includes plastic foreign body, metal foreign body, wood foreign body.  At this point I did explore this area.  No foreign bodies noted.  His foot is highly callused suspect that this from him not wearing shoes which she admits.  Encouraged him to wear shoes.  Will place him on 2 antibiotics first dose here.  I will have him come back to see me tomorrow.  I did take photographs so as I can compare them tomorrow.  Given return precautions.    Risk  OTC drugs.  Prescription drug management.            REASSESSMENT              CONSULTS:  None    PROCEDURES:  Unless otherwise noted below, none     Incision/Drainage    Date/Time: 5/25/2024 10:31 AM    Performed by: Davey Duarte DO  Authorized by: Davey Duarte DO    Consent:     Consent obtained:  Verbal    Consent given by:  Patient    Risks discussed:  Bleeding, incomplete drainage, pain and infection    Alternatives discussed:  No treatment  Universal protocol:     Procedure explained and questions answered to patient or proxy's satisfaction: yes      Patient identity confirmed:  Verbally with patient  Location:     Type:  Abscess    Size:  1    Location: left foot.  Pre-procedure details:     Skin preparation:  Antiseptic wash (Alcohol wipe)  Sedation:     Sedation type:  None  Anesthesia:     Anesthesia method:  None  Procedure type:     Complexity:  Simple  Procedure details:     Ultrasound guidance: no      Incision types:  Stab incision (18-gauge needle)

## 2024-05-25 NOTE — DISCHARGE INSTRUCTIONS
As you spoke, I am going to place you on 2 different antibiotics Bactrim and Keflex.  You were given a dose of each here.  It is imperative that you wear white cotton socks, keep a bandage and keep this area clean.  Highly recommend shoes as well.  I want you to return tomorrow morning so I can make sure that this is not getting any worse.  Take antibiotics as prescribed until gone.  Use Tylenol ibuprofen for any pain keep elevated as well.  Return to the emergency department start running a fever or if there is any other questions or concerns but I need to see you tomorrow morning by 8:00 AM.

## 2024-05-25 NOTE — ED TRIAGE NOTES
Pt states he has had something in the bottom of his left foot for a few days, pt states his wife pulled something out that resembled fishing line    Pt states he was walking barefoot in the garage, so it could be anything   pt states he noticed pus coming from the wound yesterday and now his entire left foot is swollen   pt denies pain at rest, but walking/weightbearing is painful

## 2024-05-26 ENCOUNTER — HOSPITAL ENCOUNTER (EMERGENCY)
Age: 69
Discharge: HOME | End: 2024-05-26
Attending: FAMILY MEDICINE
Payer: MEDICARE

## 2024-05-26 VITALS
SYSTOLIC BLOOD PRESSURE: 128 MMHG | RESPIRATION RATE: 18 BRPM | BODY MASS INDEX: 38.65 KG/M2 | WEIGHT: 270 LBS | DIASTOLIC BLOOD PRESSURE: 75 MMHG | TEMPERATURE: 98.1 F | OXYGEN SATURATION: 99 % | HEART RATE: 63 BPM | HEIGHT: 70 IN

## 2024-05-26 DIAGNOSIS — Z51.89 ENCOUNTER FOR WOUND RE-CHECK: Primary | ICD-10-CM

## 2024-05-26 PROCEDURE — 99282 EMERGENCY DEPT VISIT SF MDM: CPT

## 2024-05-26 ASSESSMENT — PAIN SCALES - GENERAL: PAINLEVEL_OUTOF10: 0

## 2024-05-26 ASSESSMENT — PAIN - FUNCTIONAL ASSESSMENT: PAIN_FUNCTIONAL_ASSESSMENT: 0-10

## 2024-05-26 NOTE — DISCHARGE INSTRUCTIONS
As we spoke, appears to be improving considerably.  At this point I highly recommend shoes.  Return here to the emergency department if you have any questions or concerns finish all your antibiotics as prescribed.

## 2024-05-26 NOTE — ED TRIAGE NOTES
Pt has returned to ED to have wound on bottom of left foot checked.   Pt had a foreign body in bottom heel, was started on abx   pt states pain and swelling is much improved.

## 2024-05-26 NOTE — ED PROVIDER NOTES
[facility] EMERGENCY DEPT  EMERGENCY DEPARTMENT ENCOUNTER      Pt Name: Conrado Cook  MRN: [id number]  Birthdate 1955  Date of evaluation: 5/26/2024  Provider: Davey Duarte DO  7:52 AM    CHIEF COMPLAINT       Chief Complaint   Patient presents with    Wound Check         HISTORY OF PRESENT ILLNESS    Conrado Cook is a 68 y.o. male who presents to the emergency department patient comes in for wound check after being seen yesterday with an I&D and antibiotics starting for a wound on his left foot.  He acknowledges taking the bandage off and not wearing shoes.  States he has no pain whatsoever in his left foot.    HPI    Nursing Notes were reviewed.    REVIEW OF SYSTEMS       Review of Systems   Skin:  Positive for wound.   All other systems reviewed and are negative.      Except as noted above the remainder of the review of systems was reviewed and negative.       PAST MEDICAL HISTORY     Past Medical History:   Diagnosis Date    Atrial fibrillation (HCC) 01/07/2020    Cirrhosis of liver (HCC)     Coronary artery disease     Coronary atherosclerosis of native coronary artery     Stable, no recent angina    ED (erectile dysfunction)     Elevated blood pressure reading without diagnosis of hypertension     Monitor     Esophageal varices in alcoholic cirrhosis (HCC)     History of hepatitis C     treated with interferon    Obstructive sleep apnea 1/20/2020    JYOTI on CPAP     Other and unspecified hyperlipidemia     Paroxysmal atrial fibrillation (HCC) 1/8/2020    with pericardial effusion    Platelet inhibition due to Plavix     on Eliquis-discontinued    Postsurgical percutaneous transluminal coronary angioplasty status 2019    Eight years since stent, no recent stent testing, EKG normal         SURGICAL HISTORY       Past Surgical History:   Procedure Laterality Date    COLONOSCOPY N/A 5/16/2022    COLONOSCOPY, polypectomy performed by Lanny Elizalde MD at [facility] ENDOSCOPY    CORONARY ANGIOPLASTY WITH STENT

## 2024-06-13 DIAGNOSIS — I50.32 CHRONIC DIASTOLIC (CONGESTIVE) HEART FAILURE (HCC): ICD-10-CM

## 2024-06-13 RX ORDER — FUROSEMIDE 20 MG/1
20 TABLET ORAL DAILY
Qty: 90 TABLET | Refills: 3 | Status: SHIPPED | OUTPATIENT
Start: 2024-06-13

## 2024-06-13 NOTE — TELEPHONE ENCOUNTER
Requested Prescriptions     Pending Prescriptions Disp Refills    furosemide (LASIX) 20 MG tablet 90 tablet 3     Sig: Take 1 tablet by mouth daily

## 2024-08-19 ENCOUNTER — HOSPITAL ENCOUNTER (OUTPATIENT)
Age: 69
Discharge: HOME | End: 2024-08-22
Payer: MEDICARE

## 2024-08-19 DIAGNOSIS — H65.23 CHRONIC SEROUS OTITIS MEDIA, BILATERAL: ICD-10-CM

## 2024-08-19 PROCEDURE — 70480 CT ORBIT/EAR/FOSSA W/O DYE: CPT

## 2024-08-21 ENCOUNTER — OFFICE VISIT (OUTPATIENT)
Age: 69
End: 2024-08-21

## 2024-08-21 VITALS
OXYGEN SATURATION: 98 % | BODY MASS INDEX: 39.22 KG/M2 | HEART RATE: 55 BPM | WEIGHT: 274 LBS | DIASTOLIC BLOOD PRESSURE: 62 MMHG | SYSTOLIC BLOOD PRESSURE: 103 MMHG | HEIGHT: 70 IN

## 2024-08-21 DIAGNOSIS — I50.32 CHRONIC DIASTOLIC (CONGESTIVE) HEART FAILURE (HCC): Primary | ICD-10-CM

## 2024-08-21 DIAGNOSIS — I25.119 ATHEROSCLEROSIS OF NATIVE CORONARY ARTERY OF NATIVE HEART WITH ANGINA PECTORIS (HCC): ICD-10-CM

## 2024-08-21 DIAGNOSIS — I48.0 PAROXYSMAL ATRIAL FIBRILLATION (HCC): ICD-10-CM

## 2024-08-21 DIAGNOSIS — Z98.61 CORONARY ANGIOPLASTY STATUS: ICD-10-CM

## 2024-08-21 DIAGNOSIS — I10 ESSENTIAL (PRIMARY) HYPERTENSION: ICD-10-CM

## 2024-08-21 DIAGNOSIS — E78.2 MIXED HYPERLIPIDEMIA: ICD-10-CM

## 2024-08-21 RX ORDER — LACTULOSE 10 G/15ML
20 SOLUTION ORAL 2 TIMES DAILY
COMMUNITY

## 2024-08-21 NOTE — PROGRESS NOTES
1. Have you been to the ER, urgent care clinic since your last visit?  Hospitalized since your last visit?     no      2.  Where do you normally have your labs drawn?       3. Do you need any refills today?   no        
elevated.  Discontinue losartan and change to olmesartan.  Ordered lab.  LFT followed by GI.  Continue statin and other treatment  4/2023  Seen earlier than his usual follow-up with complaint of increased palpitation and fatigue.  Clinically appears A-fib.  Decrease Benicar to 20 mg a day add metoprolol 25 twice a day.  Currently will hold off on anticoagulation due to low platelet.  If he is in A-fib consider short-term anticoagulation with the aim for Watchman procedure.  Check echo and event monitor.  Patient in A-fib will start Eliquis and refer to EP  5/2023  Continues to be short of breath on activity.  ER visit noted.  Patient is back in sinus rhythm in ER as well as here.  Likely not contributed by A-fib.  Check high-resolution CT scan.  Follow-up pulmonary evaluation.  Add spironolactone in view of cirrhosis of liver and intermittent edema.  Follow-up labs.  Also will set up evaluation with Dr. Harris to assess patient's ability to get antiplatelet and anticoagulation  6/1/2023  Patient seen in follow up for testing results. He c/o Mild dyspnea on exertion and BLE edema after drinking alcohol 3-4 beers / day.  He denies chest pain, or palpitations.  Testing reviewed and discussed with patient, nuclear stress test with low risk of ischemia, CT scan with severe coronary calcifications, changes consistent with cirrhosis with increased extravascular volume.  Echo with EF 45-50%, decreased from 64 % prior, RA, RV dilated, Will change Metoprolol to Coreg.  Resume Lasix, continue all other medications.  At f/u visit will consider changing Olmesartan to Entresto.  Strongly advised to decrease alcohol consumption, encouraged low sodium diet.   7/2023  Patient has some improvement of his shortness of breath but still has shortness of breath class III.  Recently started on Coreg that has helped him some.  We will change olmesartan to low-dose Entresto.  Blood pressure on low side but asymptomatic will continue to

## 2024-08-28 RX ORDER — ROSUVASTATIN CALCIUM 10 MG/1
10 TABLET, COATED ORAL EVERY EVENING
Qty: 30 TABLET | Refills: 4 | Status: SHIPPED | OUTPATIENT
Start: 2024-08-28

## 2024-12-03 ENCOUNTER — OFFICE VISIT (OUTPATIENT)
Age: 69
End: 2024-12-03
Payer: MEDICARE

## 2024-12-03 VITALS
WEIGHT: 283 LBS | OXYGEN SATURATION: 97 % | HEIGHT: 70 IN | BODY MASS INDEX: 40.52 KG/M2 | HEART RATE: 56 BPM | SYSTOLIC BLOOD PRESSURE: 123 MMHG | DIASTOLIC BLOOD PRESSURE: 70 MMHG

## 2024-12-03 DIAGNOSIS — Z95.5 HISTORY OF CORONARY ARTERY STENT PLACEMENT: ICD-10-CM

## 2024-12-03 DIAGNOSIS — E78.2 MIXED HYPERLIPIDEMIA: ICD-10-CM

## 2024-12-03 DIAGNOSIS — I48.0 PAROXYSMAL ATRIAL FIBRILLATION (HCC): ICD-10-CM

## 2024-12-03 DIAGNOSIS — F10.11 HISTORY OF ALCOHOL ABUSE: ICD-10-CM

## 2024-12-03 DIAGNOSIS — I25.119 ATHEROSCLEROSIS OF NATIVE CORONARY ARTERY OF NATIVE HEART WITH ANGINA PECTORIS (HCC): Primary | ICD-10-CM

## 2024-12-03 DIAGNOSIS — I50.32 CHRONIC DIASTOLIC (CONGESTIVE) HEART FAILURE (HCC): ICD-10-CM

## 2024-12-03 DIAGNOSIS — I10 ESSENTIAL (PRIMARY) HYPERTENSION: ICD-10-CM

## 2024-12-03 PROCEDURE — G8484 FLU IMMUNIZE NO ADMIN: HCPCS | Performed by: INTERNAL MEDICINE

## 2024-12-03 PROCEDURE — G8427 DOCREV CUR MEDS BY ELIG CLIN: HCPCS | Performed by: INTERNAL MEDICINE

## 2024-12-03 PROCEDURE — 1123F ACP DISCUSS/DSCN MKR DOCD: CPT | Performed by: INTERNAL MEDICINE

## 2024-12-03 PROCEDURE — 3074F SYST BP LT 130 MM HG: CPT | Performed by: INTERNAL MEDICINE

## 2024-12-03 PROCEDURE — 1160F RVW MEDS BY RX/DR IN RCRD: CPT | Performed by: INTERNAL MEDICINE

## 2024-12-03 PROCEDURE — 3078F DIAST BP <80 MM HG: CPT | Performed by: INTERNAL MEDICINE

## 2024-12-03 PROCEDURE — 3017F COLORECTAL CA SCREEN DOC REV: CPT | Performed by: INTERNAL MEDICINE

## 2024-12-03 PROCEDURE — 1126F AMNT PAIN NOTED NONE PRSNT: CPT | Performed by: INTERNAL MEDICINE

## 2024-12-03 PROCEDURE — 99214 OFFICE O/P EST MOD 30 MIN: CPT | Performed by: INTERNAL MEDICINE

## 2024-12-03 PROCEDURE — 1036F TOBACCO NON-USER: CPT | Performed by: INTERNAL MEDICINE

## 2024-12-03 PROCEDURE — 1159F MED LIST DOCD IN RCRD: CPT | Performed by: INTERNAL MEDICINE

## 2024-12-03 PROCEDURE — G8417 CALC BMI ABV UP PARAM F/U: HCPCS | Performed by: INTERNAL MEDICINE

## 2024-12-03 RX ORDER — ROSUVASTATIN CALCIUM 10 MG/1
10 TABLET, COATED ORAL EVERY EVENING
Qty: 90 TABLET | Refills: 3 | Status: SHIPPED | OUTPATIENT
Start: 2024-12-03

## 2024-12-03 RX ORDER — FUROSEMIDE 40 MG/1
40 TABLET ORAL DAILY PRN
Qty: 90 TABLET | Refills: 1
Start: 2024-12-03

## 2024-12-03 RX ORDER — ASPIRIN 81 MG/1
81 TABLET ORAL DAILY
COMMUNITY
Start: 2024-12-03

## 2024-12-03 ASSESSMENT — PATIENT HEALTH QUESTIONNAIRE - PHQ9
SUM OF ALL RESPONSES TO PHQ QUESTIONS 1-9: 0
SUM OF ALL RESPONSES TO PHQ9 QUESTIONS 1 & 2: 0
2. FEELING DOWN, DEPRESSED OR HOPELESS: NOT AT ALL
1. LITTLE INTEREST OR PLEASURE IN DOING THINGS: NOT AT ALL
DEPRESSION UNABLE TO ASSESS: FUNCTIONAL CAPACITY MOTIVATION LIMITS ACCURACY

## 2024-12-03 ASSESSMENT — ENCOUNTER SYMPTOMS
RESPIRATORY NEGATIVE: 1
EYES NEGATIVE: 1
GASTROINTESTINAL NEGATIVE: 1

## 2024-12-03 NOTE — PROGRESS NOTES
1. Have you been to the ER, urgent care clinic since your last visit?  Hospitalized since your last visit?     No      2.  Where do you normally have your labs drawn?   [facility]    3. Do you need any refills today?   No    4. Which local pharmacy do you use (enter pharmacy)?   [facility]    5. Which mail order pharmacy do you use (enter pharmacy)?   No     6. Are you here for surgical clearance and if so who will be doing your     procedure/surgery (care team)?   No

## 2024-12-03 NOTE — PROGRESS NOTES
Conrado Cook is a 69 y.o. year old male.    Follow-up of CHF, CAD, old PCI, atrial fibrillation, hypertension, hyperlipidemia        1/2020.  Patient seen in office today with increasing shortness of breath started about a week ago which is progressive associated with orthopnea PND.  Denies any chest pain but he is short of breath on minimal activity.  He has noted palpitation.    9/2021  Patient presents with complaints of shortness of breath and increased bilateral lower extremity edema.  He reports this has been intermittent over the past few months.  He also complains of occasional palpitations.  He denies chest pain or orthopnea.  He reports stopped taking metoprolol several months ago because he thought this was contributing to his depression.  He is no longer taking Eliquis and is unsure why.    11/2021  Patient presents to f/u for shortness of breath and echocardiogram results. He reports SOB has improved since taking lasix he reports was recently seen by GI and has EGD scheduled in December. He denies chest pain, palpitations, or edema. He continues to drink approximately three alcoholic beverages daily during the week and six daily on weekends. He is no longer taking aspirin, reports stopped taking because he did not think he need any longer.  6/2022  Patient seen today for follow-up of his chronic conditions  6/1/2023  Patient seen in follow up for testing results.  He c/o episodes of dyspnea on exertion and BLE edema after consuming 3-4 beers.  He stopped taking lasix due to confusion with medications.  Has upcoming appt with Dr. Coronel hematology on 6/6/2023 9/2023  He did attempt to take Entresto however medications required adjustment due to hypotension.  Since stopping Entresto and decrease dose of spironolactone patient denies shortness of breath or further episodes of hypotension.  1/2024  Patient seen in follow-up for dizziness.  He reports symptoms have improved and blood pressure is stable.

## 2024-12-05 ENCOUNTER — TELEPHONE (OUTPATIENT)
Age: 69
End: 2024-12-05

## 2024-12-05 DIAGNOSIS — Z98.61 CORONARY ANGIOPLASTY STATUS: Primary | ICD-10-CM

## 2024-12-05 DIAGNOSIS — I10 ESSENTIAL (PRIMARY) HYPERTENSION: ICD-10-CM

## 2025-02-04 DIAGNOSIS — I10 ESSENTIAL (PRIMARY) HYPERTENSION: ICD-10-CM

## 2025-02-04 RX ORDER — CARVEDILOL 6.25 MG/1
6.25 TABLET ORAL 2 TIMES DAILY
Qty: 60 TABLET | Refills: 3 | Status: SHIPPED | OUTPATIENT
Start: 2025-02-04

## 2025-05-28 ENCOUNTER — APPOINTMENT (OUTPATIENT)
Age: 70
End: 2025-05-28
Payer: MEDICARE

## 2025-05-28 ENCOUNTER — HOSPITAL ENCOUNTER (EMERGENCY)
Age: 70
Discharge: HOME | End: 2025-05-28
Attending: EMERGENCY MEDICINE
Payer: MEDICARE

## 2025-05-28 VITALS
RESPIRATION RATE: 13 BRPM | SYSTOLIC BLOOD PRESSURE: 162 MMHG | HEIGHT: 70 IN | DIASTOLIC BLOOD PRESSURE: 72 MMHG | BODY MASS INDEX: 37.22 KG/M2 | TEMPERATURE: 97.7 F | WEIGHT: 260 LBS | HEART RATE: 57 BPM | OXYGEN SATURATION: 98 %

## 2025-05-28 DIAGNOSIS — R10.13 ABDOMINAL PAIN, EPIGASTRIC: Primary | ICD-10-CM

## 2025-05-28 DIAGNOSIS — K74.60 CIRRHOSIS OF LIVER WITHOUT ASCITES, UNSPECIFIED HEPATIC CIRRHOSIS TYPE (HCC): ICD-10-CM

## 2025-05-28 DIAGNOSIS — K21.9 GASTROESOPHAGEAL REFLUX DISEASE, UNSPECIFIED WHETHER ESOPHAGITIS PRESENT: ICD-10-CM

## 2025-05-28 LAB
ALBUMIN SERPL-MCNC: 3.2 G/DL (ref 3.4–5)
ALBUMIN/GLOB SERPL: 0.9
ALP SERPL-CCNC: 145 U/L (ref 45–117)
ALT SERPL-CCNC: 42 U/L (ref 10–50)
ANION GAP SERPL CALC-SCNC: 10 MMOL/L
AST SERPL W P-5'-P-CCNC: 49 U/L (ref 10–38)
BASOPHILS # BLD: 0.03 K/UL (ref 0–0.1)
BASOPHILS NFR BLD: 0.6 % (ref 0–2)
BILIRUB SERPL-MCNC: 2.8 MG/DL (ref 0.2–1)
BUN SERPL-MCNC: 9 MG/DL (ref 6–23)
BUN/CREAT SERPL: 13
CA-I BLD-MCNC: 9 MG/DL (ref 8.5–10.1)
CHLORIDE SERPL-SCNC: 105 MMOL/L (ref 98–107)
CO2 SERPL-SCNC: 24 MMOL/L (ref 21–32)
CREAT SERPL-MCNC: 0.7 MG/DL (ref 0.6–1.3)
DIFFERENTIAL METHOD BLD: ABNORMAL
EOSINOPHIL # BLD: 0.1 K/UL (ref 0–0.4)
EOSINOPHIL NFR BLD: 1.9 % (ref 0–5)
ERYTHROCYTE [DISTWIDTH] IN BLOOD BY AUTOMATED COUNT: 13.8 % (ref 11.6–14.5)
GLOBULIN SER CALC-MCNC: 3.5 G/DL
GLUCOSE SERPL-MCNC: 148 MG/DL (ref 74–108)
HCT VFR BLD AUTO: 38.5 % (ref 36–48)
HGB BLD-MCNC: 13.8 G/DL (ref 13–16)
IMM GRANULOCYTES # BLD AUTO: 0.04 K/UL (ref 0–0.04)
IMM GRANULOCYTES NFR BLD AUTO: 0.7 % (ref 0–0.5)
LIPASE SERPL-CCNC: 36 U/L (ref 13–75)
LYMPHOCYTES # BLD: 0.55 K/UL (ref 0.9–3.6)
LYMPHOCYTES NFR BLD: 10.2 % (ref 21–52)
MCH RBC QN AUTO: 34.7 PG (ref 24–34)
MCHC RBC AUTO-ENTMCNC: 35.8 G/DL (ref 31–37)
MCV RBC AUTO: 96.7 FL (ref 78–100)
MONOCYTES # BLD: 0.63 K/UL (ref 0.05–1.2)
MONOCYTES NFR BLD: 11.7 % (ref 3–10)
NEUTS SEG # BLD: 4.02 K/UL (ref 1.8–8)
NEUTS SEG NFR BLD: 74.9 % (ref 40–73)
NRBC # BLD: 0 K/UL (ref 0–0.01)
NRBC BLD-RTO: 0 PER 100 WBC
PLATELET # BLD AUTO: 103 K/UL (ref 135–420)
PMV BLD AUTO: 9.3 FL (ref 9.2–11.8)
POTASSIUM SERPL-SCNC: 4.3 MMOL/L (ref 3.5–5.5)
PROT SERPL-MCNC: 6.7 G/DL (ref 6.4–8.2)
RBC # BLD AUTO: 3.98 M/UL (ref 4.35–5.65)
SODIUM SERPL-SCNC: 138 MMOL/L (ref 136–145)
TROPONIN T SERPL HS-MCNC: 13.1 NG/L (ref 0–22)
TROPONIN T SERPL HS-MCNC: 13.9 NG/L (ref 0–22)
WBC # BLD AUTO: 5.4 K/UL (ref 4.6–13.2)

## 2025-05-28 PROCEDURE — 6360000002 HC RX W HCPCS: Performed by: EMERGENCY MEDICINE

## 2025-05-28 PROCEDURE — 80053 COMPREHEN METABOLIC PANEL: CPT

## 2025-05-28 PROCEDURE — 71045 X-RAY EXAM CHEST 1 VIEW: CPT

## 2025-05-28 PROCEDURE — 96375 TX/PRO/DX INJ NEW DRUG ADDON: CPT

## 2025-05-28 PROCEDURE — 2580000003 HC RX 258: Performed by: EMERGENCY MEDICINE

## 2025-05-28 PROCEDURE — 6360000004 HC RX CONTRAST MEDICATION: Performed by: EMERGENCY MEDICINE

## 2025-05-28 PROCEDURE — 85025 COMPLETE CBC W/AUTO DIFF WBC: CPT

## 2025-05-28 PROCEDURE — 93005 ELECTROCARDIOGRAM TRACING: CPT | Performed by: EMERGENCY MEDICINE

## 2025-05-28 PROCEDURE — 6370000000 HC RX 637 (ALT 250 FOR IP): Performed by: EMERGENCY MEDICINE

## 2025-05-28 PROCEDURE — 83690 ASSAY OF LIPASE: CPT

## 2025-05-28 PROCEDURE — 96374 THER/PROPH/DIAG INJ IV PUSH: CPT

## 2025-05-28 PROCEDURE — 84484 ASSAY OF TROPONIN QUANT: CPT

## 2025-05-28 PROCEDURE — 99285 EMERGENCY DEPT VISIT HI MDM: CPT

## 2025-05-28 PROCEDURE — 74177 CT ABD & PELVIS W/CONTRAST: CPT

## 2025-05-28 RX ORDER — MAGNESIUM HYDROXIDE/ALUMINUM HYDROXICE/SIMETHICONE 120; 1200; 1200 MG/30ML; MG/30ML; MG/30ML
30 SUSPENSION ORAL
Status: COMPLETED | OUTPATIENT
Start: 2025-05-28 | End: 2025-05-28

## 2025-05-28 RX ORDER — LIDOCAINE HYDROCHLORIDE 20 MG/ML
10 SOLUTION OROPHARYNGEAL
Status: COMPLETED | OUTPATIENT
Start: 2025-05-28 | End: 2025-05-28

## 2025-05-28 RX ORDER — PANTOPRAZOLE SODIUM 40 MG/1
40 TABLET, DELAYED RELEASE ORAL DAILY
Qty: 30 TABLET | Refills: 0 | Status: SHIPPED | OUTPATIENT
Start: 2025-05-28

## 2025-05-28 RX ORDER — IOPAMIDOL 755 MG/ML
100 INJECTION, SOLUTION INTRAVASCULAR
Status: COMPLETED | OUTPATIENT
Start: 2025-05-28 | End: 2025-05-28

## 2025-05-28 RX ORDER — ONDANSETRON 2 MG/ML
4 INJECTION INTRAMUSCULAR; INTRAVENOUS
Status: COMPLETED | OUTPATIENT
Start: 2025-05-28 | End: 2025-05-28

## 2025-05-28 RX ADMIN — ALUMINUM HYDROXIDE, MAGNESIUM HYDROXIDE, AND SIMETHICONE 30 ML: 200; 200; 20 SUSPENSION ORAL at 11:20

## 2025-05-28 RX ADMIN — FAMOTIDINE 20 MG: 10 INJECTION, SOLUTION INTRAVENOUS at 14:49

## 2025-05-28 RX ADMIN — LIDOCAINE HYDROCHLORIDE 10 ML: 20 SOLUTION ORAL at 11:20

## 2025-05-28 RX ADMIN — ONDANSETRON 4 MG: 2 INJECTION, SOLUTION INTRAMUSCULAR; INTRAVENOUS at 11:22

## 2025-05-28 RX ADMIN — IOPAMIDOL 100 ML: 755 INJECTION, SOLUTION INTRAVENOUS at 13:46

## 2025-05-28 ASSESSMENT — PAIN DESCRIPTION - DESCRIPTORS
DESCRIPTORS: DULL
DESCRIPTORS: DULL

## 2025-05-28 ASSESSMENT — PAIN DESCRIPTION - LOCATION
LOCATION: ABDOMEN;CHEST
LOCATION: ABDOMEN;CHEST

## 2025-05-28 ASSESSMENT — PAIN SCALES - GENERAL
PAINLEVEL_OUTOF10: 8
PAINLEVEL_OUTOF10: 0
PAINLEVEL_OUTOF10: 8

## 2025-05-28 ASSESSMENT — PAIN DESCRIPTION - ORIENTATION
ORIENTATION: MID
ORIENTATION: MID

## 2025-05-28 ASSESSMENT — LIFESTYLE VARIABLES
HOW OFTEN DO YOU HAVE A DRINK CONTAINING ALCOHOL: NEVER
HOW MANY STANDARD DRINKS CONTAINING ALCOHOL DO YOU HAVE ON A TYPICAL DAY: PATIENT DOES NOT DRINK

## 2025-05-28 ASSESSMENT — PAIN - FUNCTIONAL ASSESSMENT: PAIN_FUNCTIONAL_ASSESSMENT: 0-10

## 2025-05-28 NOTE — DISCHARGE INSTR - COC
Continuity of Care Form    Patient Name: Conrado Cook   :  1955  MRN:  [id number]    Admit date:  2025  Discharge date:  ***    Code Status Order: No Order   Advance Directives:     Admitting Physician:  No admitting provider for patient encounter.  PCP: Patricia Mayes APRN - NP    Discharging Nurse: ***  Discharging Hospital Unit/Room#: ER08/08  Discharging Unit Phone Number: ***    Emergency Contact:   Extended Emergency Contact Information  Primary Emergency Contact: Laura Cook  Address: [address] [place] [address]  Home Phone: [phone]  Mobile Phone: [phone]  Relation: Spouse    Past Surgical History:  Past Surgical History:   Procedure Laterality Date    COLONOSCOPY N/A 2022    COLONOSCOPY, polypectomy performed by Lanny Elizalde MD at [facility] ENDOSCOPY    CORONARY ANGIOPLASTY WITH STENT PLACEMENT      HEENT      wisdom teeth    [place] UNLISTED PROCEDURE CARDIAC SURGERY      pericardial window-Large Pericardial effusion with Tamponade    UPPER GASTROINTESTINAL ENDOSCOPY N/A 11/10/2023    EGD ESOPHAGOGASTRODUODENOSCOPY performed by Lanny Elizalde MD at [facility] ENDOSCOPY       Immunization History:   Immunization History   Administered Date(s) Administered    COVID-19, PFIZER PURPLE top, DILUTE for use, (age 12 y+), 30mcg/0.3mL 2021, 2021    Hep A, HAVRIX, VAQTA, (age 19y+), IM, 1mL 2017    Hep B, ENGERIX-B, (age 20y+), IM, 1mL 2017    Influenza Virus Vaccine 2021    Influenza, FLUARIX, FLULAVAL, FLUZONE (age 6 mo+) and AFLURIA, (age 3 y+), Quadv PF, 0.5mL 2020       Active Problems:  Patient Active Problem List   Diagnosis Code    Cardiac tamponade I31.4    Benign prostatic hyperplasia with lower urinary tract symptoms N40.1    CAD S/P percutaneous coronary angioplasty I25.10, Z98.61    Pleural effusion, left J90    Pericardial effusion with cardiac tamponade I31.39, I31.4    Erectile dysfunction N52.9

## 2025-05-28 NOTE — ED TRIAGE NOTES
Patient is in today for complaint of mid sternal pain 8/10 and dull. Patient also complains of upper GI pain. Patient states it has been abdominal pain for a few days but today it was more sternal pain so his  Told him to come to the ER. Patient is being worked up for liver transplant currently. BP elevated at 191/75. Patient has not taken his BP meds yet today.

## 2025-05-28 NOTE — ED PROVIDER NOTES
EKG:   EKG interpretation: (Preliminary)  EKG read by Dr. Cayden Lundberg at 1039  EKG: nonspecific ST and T waves changes, sinus bradycardia, Rate 58, unchanged from previous EKGs     RADIOLOGY:  Non-plain film images such as CT, Ultrasound and MRI are read by the radiologist. Plain radiographic images are visualized and preliminarily interpreted by the ED Provider with the below findings:     chest X-ray  No acute process     Interpretation per the Radiologist below, if available at the time of this note:     CT ABDOMEN PELVIS W IV CONTRAST Additional Contrast? None   Final Result   1. Chronic hepatic disease as detailed.   2. No acute finding.         Electronically signed by ALEISHA CASTILLO      XR CHEST PORTABLE   Final Result   1. No acute disease            Electronically signed by Dane Agarwal           PROCEDURES   Unless otherwise noted below, none  Procedures     CRITICAL CARE TIME       EMERGENCY DEPARTMENT COURSE and DIFFERENTIAL DIAGNOSIS/MDM   Vitals:    Vitals:    05/28/25 1430 05/28/25 1445 05/28/25 1515 05/28/25 1530   BP: (!) 166/67  (!) 169/60 (!) 162/72   Pulse: 54 56 54 57   Resp: 14 12 12 13   Temp:       TempSrc:       SpO2: 97% 97% 98% 98%   Weight:       Height:            Patient was given the following medications:  Medications   ondansetron (ZOFRAN) injection 4 mg (4 mg IntraVENous Given 5/28/25 1122)   aluminum & magnesium hydroxide-simethicone (MAALOX PLUS) 200-200-20 MG/5ML suspension 30 mL (30 mLs Oral Given 5/28/25 1120)   lidocaine viscous hcl (XYLOCAINE) 2 % solution 10 mL (10 mLs Mouth/Throat Given 5/28/25 1120)   iopamidol (ISOVUE-370) 76 % injection 100 mL (100 mLs IntraVENous Given 5/28/25 1346)   famotidine (PEPCID) 20 MG/2ML 20 mg in sodium chloride (PF) 0.9 % 10 mL injection (20 mg IntraVENous Given 5/28/25 1449)       CONSULTS: (Who and What was discussed)  None    Chronic Conditions: As above    Social Determinants affecting Dx or Tx:  None    Records Reviewed (source

## 2025-05-29 LAB
EKG ATRIAL RATE: 58 BPM
EKG DIAGNOSIS: NORMAL
EKG P AXIS: 40 DEGREES
EKG P-R INTERVAL: 184 MS
EKG Q-T INTERVAL: 452 MS
EKG QRS DURATION: 88 MS
EKG QTC CALCULATION (BAZETT): 443 MS
EKG R AXIS: 18 DEGREES
EKG T AXIS: 12 DEGREES
EKG VENTRICULAR RATE: 58 BPM

## 2025-05-31 ASSESSMENT — HEART SCORE: ECG: NON-SPECIFC REPOLARIZATION DISTURBANCE/LBTB/PM

## 2025-06-09 ENCOUNTER — OFFICE VISIT (OUTPATIENT)
Age: 70
End: 2025-06-09
Payer: MEDICARE

## 2025-06-09 VITALS
HEART RATE: 61 BPM | SYSTOLIC BLOOD PRESSURE: 165 MMHG | OXYGEN SATURATION: 99 % | BODY MASS INDEX: 37.22 KG/M2 | HEIGHT: 70 IN | WEIGHT: 260 LBS | DIASTOLIC BLOOD PRESSURE: 79 MMHG

## 2025-06-09 DIAGNOSIS — E78.2 MIXED HYPERLIPIDEMIA: ICD-10-CM

## 2025-06-09 DIAGNOSIS — I25.119 ATHEROSCLEROSIS OF NATIVE CORONARY ARTERY OF NATIVE HEART WITH ANGINA PECTORIS: ICD-10-CM

## 2025-06-09 DIAGNOSIS — I10 ESSENTIAL (PRIMARY) HYPERTENSION: ICD-10-CM

## 2025-06-09 DIAGNOSIS — Z98.61 CORONARY ANGIOPLASTY STATUS: ICD-10-CM

## 2025-06-09 DIAGNOSIS — I50.32 CHRONIC DIASTOLIC (CONGESTIVE) HEART FAILURE (HCC): Primary | ICD-10-CM

## 2025-06-09 PROCEDURE — 1126F AMNT PAIN NOTED NONE PRSNT: CPT | Performed by: NURSE PRACTITIONER

## 2025-06-09 PROCEDURE — 1159F MED LIST DOCD IN RCRD: CPT | Performed by: NURSE PRACTITIONER

## 2025-06-09 PROCEDURE — 1036F TOBACCO NON-USER: CPT | Performed by: NURSE PRACTITIONER

## 2025-06-09 PROCEDURE — 3017F COLORECTAL CA SCREEN DOC REV: CPT | Performed by: NURSE PRACTITIONER

## 2025-06-09 PROCEDURE — 1123F ACP DISCUSS/DSCN MKR DOCD: CPT | Performed by: NURSE PRACTITIONER

## 2025-06-09 PROCEDURE — G8417 CALC BMI ABV UP PARAM F/U: HCPCS | Performed by: NURSE PRACTITIONER

## 2025-06-09 PROCEDURE — 99214 OFFICE O/P EST MOD 30 MIN: CPT | Performed by: NURSE PRACTITIONER

## 2025-06-09 PROCEDURE — G8427 DOCREV CUR MEDS BY ELIG CLIN: HCPCS | Performed by: NURSE PRACTITIONER

## 2025-06-09 PROCEDURE — 1160F RVW MEDS BY RX/DR IN RCRD: CPT | Performed by: NURSE PRACTITIONER

## 2025-06-09 PROCEDURE — 3077F SYST BP >= 140 MM HG: CPT | Performed by: NURSE PRACTITIONER

## 2025-06-09 PROCEDURE — 3078F DIAST BP <80 MM HG: CPT | Performed by: NURSE PRACTITIONER

## 2025-06-09 ASSESSMENT — PATIENT HEALTH QUESTIONNAIRE - PHQ9
SUM OF ALL RESPONSES TO PHQ QUESTIONS 1-9: 0
1. LITTLE INTEREST OR PLEASURE IN DOING THINGS: NOT AT ALL
SUM OF ALL RESPONSES TO PHQ QUESTIONS 1-9: 0
2. FEELING DOWN, DEPRESSED OR HOPELESS: NOT AT ALL
SUM OF ALL RESPONSES TO PHQ QUESTIONS 1-9: 0
SUM OF ALL RESPONSES TO PHQ QUESTIONS 1-9: 0

## 2025-06-09 ASSESSMENT — ENCOUNTER SYMPTOMS
EYES NEGATIVE: 1
RESPIRATORY NEGATIVE: 1
GASTROINTESTINAL NEGATIVE: 1

## 2025-06-09 NOTE — PROGRESS NOTES
Conrado Cook is a 69 y.o. year old male.    Follow-up of CHF, CAD, old PCI, atrial fibrillation, hypertension, hyperlipidemia        1/2020.  Patient seen in office today with increasing shortness of breath started about a week ago which is progressive associated with orthopnea PND.  Denies any chest pain but he is short of breath on minimal activity.  He has noted palpitation.    9/2021  Patient presents with complaints of shortness of breath and increased bilateral lower extremity edema.  He reports this has been intermittent over the past few months.  He also complains of occasional palpitations.  He denies chest pain or orthopnea.  He reports stopped taking metoprolol several months ago because he thought this was contributing to his depression.  He is no longer taking Eliquis and is unsure why.    11/2021  Patient presents to f/u for shortness of breath and echocardiogram results. He reports SOB has improved since taking lasix he reports was recently seen by GI and has EGD scheduled in December. He denies chest pain, palpitations, or edema. He continues to drink approximately three alcoholic beverages daily during the week and six daily on weekends. He is no longer taking aspirin, reports stopped taking because he did not think he need any longer.  6/2022  Patient seen today for follow-up of his chronic conditions  6/1/2023  Patient seen in follow up for testing results.  He c/o episodes of dyspnea on exertion and BLE edema after consuming 3-4 beers.  He stopped taking lasix due to confusion with medications.  Has upcoming appt with Dr. Coronel hematology on 6/6/2023 9/2023  He did attempt to take Entresto however medications required adjustment due to hypotension.  Since stopping Entresto and decrease dose of spironolactone patient denies shortness of breath or further episodes of hypotension.  1/2024  Patient seen in follow-up for dizziness.  He reports symptoms have improved and blood pressure is stable.   (0) No drift; leg holds 30 degree position for full 5 secs

## 2025-06-09 NOTE — PROGRESS NOTES
1. Have you been to the ER, urgent care clinic since your last visit?  Hospitalized since your last visit?     No    2. Have you seen or consulted any other health care providers outside of the [facility] since your last visit?  Include any pap smears or colon screening.      No

## 2025-07-02 DIAGNOSIS — I10 ESSENTIAL (PRIMARY) HYPERTENSION: ICD-10-CM

## 2025-07-07 RX ORDER — CARVEDILOL 6.25 MG/1
6.25 TABLET ORAL 2 TIMES DAILY
Qty: 60 TABLET | Refills: 3 | Status: SHIPPED | OUTPATIENT
Start: 2025-07-07

## (undated) DEVICE — MEDI-VAC NON-CONDUCTIVE SUCTION TUBING: Brand: CARDINAL HEALTH

## (undated) DEVICE — BLANKET WRM W40.2XL55.9IN IORT LO BODY + MISTRAL AIR

## (undated) DEVICE — GAUZE,SPONGE,4"X4",16PLY,STRL,LF,10/TRAY: Brand: MEDLINE

## (undated) DEVICE — COVADERM: Brand: DEROYAL

## (undated) DEVICE — SWAB CULT LIQ STUART AGR AERB MOD IN BRK SGL RAYON TIP PLAS 220099] BECTON DICKINSON MICRO]

## (undated) DEVICE — FLUFF AND POLYMER UNDERPAD,EXTRA HEAVY: Brand: WINGS

## (undated) DEVICE — CANNULA ORIG TL CLR W FOAM CUSHIONS AND 14FT SUPL TB 3 CHN

## (undated) DEVICE — TRAP SPEC COLL POLYP POLYSTYR --

## (undated) DEVICE — LINER SUCT CANSTR 3000CC PLAS SFT PRE ASSEMB W/OUT TBNG W/

## (undated) DEVICE — BITE BLOCK ENDOSCP UNIV AD 6 TO 9.4 MM

## (undated) DEVICE — Device

## (undated) DEVICE — AIRLIFE™ NASAL OXYGEN CANNULA CURVED, FLARED TIP WITH 14 FOOT (4.3 M) CRUSH-RESISTANT TUBING, OVER-THE-EAR STYLE: Brand: AIRLIFE™

## (undated) DEVICE — REM POLYHESIVE ADULT PATIENT RETURN ELECTRODE: Brand: VALLEYLAB

## (undated) DEVICE — STERILE POLYISOPRENE POWDER-FREE SURGICAL GLOVES: Brand: PROTEXIS

## (undated) DEVICE — SHEET, DRAPE, SPLIT, STERILE: Brand: MEDLINE

## (undated) DEVICE — 3M™ MEDIPORE™ H SOFT CLOTH SURGICAL TAPE 2864, 4 INCH X 10 YARD (10CM X 9,14M), 12 ROLLS/CASE: Brand: 3M™ MEDIPORE™

## (undated) DEVICE — SYRINGE MED 50ML LUERSLIP TIP

## (undated) DEVICE — YANKAUER,SMOOTH HANDLE,HIGH CAPACITY: Brand: MEDLINE INDUSTRIES, INC.

## (undated) DEVICE — CANNULA NSL AD TBNG L14FT STD PVC O2 CRV CONN NONFLARED NSL

## (undated) DEVICE — SUTURE MCRYL SZ 4-0 L18IN ABSRB UD L19MM PS-2 3/8 CIR PRIM Y496G

## (undated) DEVICE — INTENT TO BE USED WITH SUTURE MATERIAL FOR TISSUE CLOSURE: Brand: RICHARD-ALLAN® NEEDLE 3/8 CIRCLE TROCAR

## (undated) DEVICE — INTENDED FOR TISSUE SEPARATION, AND OTHER PROCEDURES THAT REQUIRE A SHARP SURGICAL BLADE TO PUNCTURE OR CUT.: Brand: BARD-PARKER ® CARBON RIB-BACK BLADES

## (undated) DEVICE — SPONGE DRAIN NONWOVEN 4X4IN -- 2/PK

## (undated) DEVICE — CULTURETTE SGL EVAC TUBE PALL -- 100/CA

## (undated) DEVICE — SYR 50ML SLIP TIP NSAF LF STRL --

## (undated) DEVICE — DRAPE,SPLIT,CARDIOVASC,STERILE: Brand: MEDLINE

## (undated) DEVICE — SOLUTION IRRIG 1000ML H2O STRL BLT

## (undated) DEVICE — GOWN,REINFORCED,POLY,AURORA,XLARGE,STRL: Brand: MEDLINE

## (undated) DEVICE — ENDOSCOPY PUMP TUBING/ CAP SET: Brand: ERBE

## (undated) DEVICE — MEDI-VAC SUCTION HIGH CAPACITY: Brand: CARDINAL HEALTH

## (undated) DEVICE — SUT SLK 2 60IN TIE MP BLK --

## (undated) DEVICE — SYRINGE MED 25GA 3ML L5/8IN SUBQ PLAS W/ DETACH NDL SFTY

## (undated) DEVICE — BASIN EMESIS 500CC ROSE 250/CS 60/PLT: Brand: MEDEGEN MEDICAL PRODUCTS, LLC

## (undated) DEVICE — AGENT HEMSTAT W3XL4IN OXIDIZED REGENERATED CELOS ABSRB FOR

## (undated) DEVICE — CATHETER THORACENTESIS STR 28 FRX23 IN 6 EYELET TAPR TIP LF

## (undated) DEVICE — SLEEVE COMPR STD 12 IN FOR 165IN CALF COMFORT VENODYNE SYS

## (undated) DEVICE — (D)STRIP SKN CLSR 0.5X4IN WHT --

## (undated) DEVICE — DRAPE TWL SURG 16X26IN BLU ORB04] ALLCARE INC]

## (undated) DEVICE — ELECTRODE BLDE L4IN NONINSULATED EDGE

## (undated) DEVICE — Y BARBED CONNECTOR POLYPROPYLENE, LARGE: Brand: ARGYLE

## (undated) DEVICE — SUT SLK 0 30IN FSL BLK --

## (undated) DEVICE — 3M(TM) MEDIPORE(TM) +PAD SOFT CLOTH ADHESIVE WOUND DRESSING 3566: Brand: 3M™ MEDIPORE™

## (undated) DEVICE — NEEDLE SPNL 22GA L3.5IN BLK HUB S STL REG WALL FIT STYL W/

## (undated) DEVICE — SUTURE ABSORBABLE BRAIDED 2-0 CT-1 27 IN UD VICRYL J259H

## (undated) DEVICE — CATHETER SUCT TR FL TIP 14FR W/ O CTRL

## (undated) DEVICE — TRAP,MUCUS SPECIMEN, 80CC: Brand: MEDLINE

## (undated) DEVICE — MEDI-VAC NON-CONDUCTIVE SUCTION TUBING 7MM X 6.1M (20 FT.) L: Brand: CARDINAL HEALTH

## (undated) DEVICE — SUTURE MCRYL SZ 4 0 L18IN ABSRB VLT PS 1 L24MM 3 8 CIR REV Y682H

## (undated) DEVICE — SYR 20ML LL STRL LF --

## (undated) DEVICE — CATHETER THOR 32FR L23IN PVC 5 EYELET STR ATRAUM

## (undated) DEVICE — GAUZE SPONGES,16 PLY: Brand: CURITY

## (undated) DEVICE — 3M™ STERI-STRIP™ COMPOUND BENZOIN TINCTURE 40 BAGS/CARTON 4 CARTONS/CASE C1544: Brand: 3M™ STERI-STRIP™

## (undated) DEVICE — FLEX ADVANTAGE 3000CC: Brand: FLEX ADVANTAGE

## (undated) DEVICE — (D)PREP SKN CHLRAPRP APPL 26ML -- CONVERT TO ITEM 371833

## (undated) DEVICE — SNARE POLYP M W27MMXL240CM OVL STIFF DISP CAPTIVATOR

## (undated) DEVICE — PACK PROCEDURE SURG MAJ W/ BASIN LF

## (undated) DEVICE — DRAPE,TOP,102X53,STERILE: Brand: MEDLINE

## (undated) DEVICE — SYR 10ML CTRL LR LCK NSAF LF --

## (undated) DEVICE — SUTURE VCRL SZ 4-0 L27IN ABSRB UD L19MM PS-2 3/8 CIR PRIM J426H

## (undated) DEVICE — MEDI-VAC YANK SUCT HNDL W/TPRD BULBOUS TIP: Brand: CARDINAL HEALTH

## (undated) DEVICE — BASIN EMSIS 16OZ GRAPHITE PLAS KID SHP MOLD GRAD FOR ORAL

## (undated) DEVICE — CATHETER THOR 28FR L23CM DIA9.3MM POLYVI CHL TAPR CONN TIP

## (undated) DEVICE — GOWN ISOL IMPERV UNIV, DISP, OPEN BACK, BLUE --

## (undated) DEVICE — FORCEPS BX L240CM JAW DIA2.4MM ORNG L CAP W/ NDL DISP RAD

## (undated) DEVICE — SYR 10ML LUER LOK 1/5ML GRAD --

## (undated) DEVICE — AMD ANTIMICROBIAL DRAIN SPONGES, 6 PLY, 0.2% POLYHEXAMETHYLENE BIGUANIDE HCI (PHMB): Brand: EXCILON

## (undated) DEVICE — GARMENT,MEDLINE,DVT,INT,CALF,MED, GEN2: Brand: MEDLINE

## (undated) DEVICE — CATHETER THOR 32FR L23IN PVC 6 EYELET STR ATRAUM

## (undated) DEVICE — DRAPE TOWEL: Brand: CONVERTORS

## (undated) DEVICE — UNDERPAD INCONT W23XL36IN STD BLU POLYPR BK FLUF SFT

## (undated) DEVICE — SUTURE VCRL SZ 2-0 L27IN ABSRB UD L26MM CT-2 1/2 CIR J269H

## (undated) DEVICE — FCPS RAD JAW 4LC 240CM W/NDL -- BX/20 RADIAL JAW 4